# Patient Record
Sex: FEMALE | Race: WHITE | NOT HISPANIC OR LATINO | Employment: STUDENT | URBAN - NONMETROPOLITAN AREA
[De-identification: names, ages, dates, MRNs, and addresses within clinical notes are randomized per-mention and may not be internally consistent; named-entity substitution may affect disease eponyms.]

---

## 2017-01-21 ENCOUNTER — HOSPITAL ENCOUNTER (EMERGENCY)
Facility: HOSPITAL | Age: 22
Discharge: HOME | End: 2017-01-21
Attending: PHYSICIAN ASSISTANT | Admitting: PHYSICIAN ASSISTANT
Payer: COMMERCIAL

## 2017-01-21 VITALS
RESPIRATION RATE: 16 BRPM | OXYGEN SATURATION: 98 % | HEART RATE: 81 BPM | DIASTOLIC BLOOD PRESSURE: 67 MMHG | SYSTOLIC BLOOD PRESSURE: 110 MMHG | HEIGHT: 64 IN | TEMPERATURE: 98 F

## 2017-01-21 DIAGNOSIS — H10.33 ACUTE BACTERIAL CONJUNCTIVITIS OF BOTH EYES: ICD-10-CM

## 2017-01-21 DIAGNOSIS — J01.00 ACUTE MAXILLARY SINUSITIS, RECURRENCE NOT SPECIFIED: ICD-10-CM

## 2017-01-21 PROCEDURE — 99213 OFFICE O/P EST LOW 20 MIN: CPT | Performed by: PHYSICIAN ASSISTANT

## 2017-01-21 PROCEDURE — 99212 OFFICE O/P EST SF 10 MIN: CPT

## 2017-01-21 RX ORDER — AMOXICILLIN 500 MG/1
500 CAPSULE ORAL 3 TIMES DAILY
Qty: 30 CAPSULE | Refills: 0 | Status: SHIPPED | OUTPATIENT
Start: 2017-01-21 | End: 2017-01-31

## 2017-01-21 RX ORDER — NEOMYCIN SULFATE, POLYMYXIN B SULFATE AND DEXAMETHASONE 3.5; 10000; 1 MG/ML; [USP'U]/ML; MG/ML
1 SUSPENSION/ DROPS OPHTHALMIC 4 TIMES DAILY
Qty: 1 BOTTLE | Refills: 0 | Status: SHIPPED | OUTPATIENT
Start: 2017-01-21 | End: 2017-01-26

## 2017-01-21 ASSESSMENT — ENCOUNTER SYMPTOMS
PHOTOPHOBIA: 0
NECK STIFFNESS: 0
TROUBLE SWALLOWING: 0
HEADACHES: 0
COUGH: 1
CARDIOVASCULAR NEGATIVE: 1
VOMITING: 0
VOICE CHANGE: 0
EYE REDNESS: 1
FATIGUE: 0
ABDOMINAL PAIN: 0
DIARRHEA: 0
PSYCHIATRIC NEGATIVE: 1
LIGHT-HEADEDNESS: 0
FEVER: 0
NAUSEA: 0
EYE DISCHARGE: 1
APPETITE CHANGE: 0
SINUS PRESSURE: 0
DIZZINESS: 0
NECK PAIN: 0
SORE THROAT: 1

## 2017-01-21 NOTE — ED PROVIDER NOTES
History     Chief Complaint   Patient presents with     Conjunctivitis     noted biltat eyes burning, itching , mattery since yesterday. Hx of URi     The history is provided by the patient. No  was used.     Candis Mays is a 21 year old female who has 2 days of bilateral eyes matted, red. Has had one month of cough/congestion and sinus pain/pressure.  Denies n/v/d/f/c    Allergies as of 01/21/2017     (No Known Allergies)     Discharge Medication List as of 1/21/2017 10:46 AM      START taking these medications    Details   amoxicillin (AMOXIL) 500 MG capsule Take 1 capsule (500 mg) by mouth 3 times daily for 10 days, Disp-30 capsule, R-0, E-Prescribe      neomycin-polymyxin-dexamethasone (MAXITROL) 3.5-58511-7.1 SUSP ophthalmic susp Place 1 drop into both eyes 4 times daily for 5 days, Disp-1 Bottle, R-0, E-Prescribe           History reviewed. No pertinent past medical history.  History reviewed. No pertinent past surgical history.  History reviewed. No pertinent family history.       Social History     Social History     Marital Status: Single     Spouse Name: N/A     Number of Children: N/A     Years of Education: N/A     Social History Main Topics     Smoking status: Never Smoker      Smokeless tobacco: Never Used     Alcohol Use: No     Drug Use: No     Sexual Activity: Not Asked     Other Topics Concern     None     Social History Narrative       I have reviewed the Medications, Allergies, Past Medical and Surgical History, and Social History in the Epic system.    Review of Systems   Constitutional: Negative for fever, appetite change and fatigue.   HENT: Positive for congestion and sore throat. Negative for ear pain, sinus pressure, trouble swallowing and voice change.    Eyes: Positive for discharge and redness. Negative for photophobia and visual disturbance.   Respiratory: Positive for cough.    Cardiovascular: Negative.    Gastrointestinal: Negative for nausea, vomiting,  "abdominal pain and diarrhea.   Genitourinary: Negative.    Musculoskeletal: Negative for neck pain and neck stiffness.   Skin: Negative for rash.   Neurological: Negative for dizziness, light-headedness and headaches.   Psychiatric/Behavioral: Negative.        Physical Exam   BP: 110/67 mmHg  Pulse: 81  Temp: 98  F (36.7  C)  Resp: 16  Height: 162.6 cm (5' 4\")  SpO2: 98 %  Physical Exam   Constitutional: She is oriented to person, place, and time. She appears well-developed and well-nourished. No distress.   HENT:   Head: Normocephalic and atraumatic.   Right Ear: External ear normal.   Left Ear: External ear normal.   Mouth/Throat: Oropharynx is clear and moist.   Bilateral TMs/canals clear/wnl  Bilateral maxillary sinus TTP     Eyes: EOM are normal. Right eye exhibits discharge. Left eye exhibits discharge. Right conjunctiva is injected. Left conjunctiva is injected.   Neck: Normal range of motion. Neck supple.   Cardiovascular: Normal rate, regular rhythm and normal heart sounds.    Pulmonary/Chest: Effort normal and breath sounds normal. No respiratory distress.   Abdominal: Soft. Bowel sounds are normal. She exhibits no distension. There is no tenderness.   Neurological: She is alert and oriented to person, place, and time.   Skin: Skin is warm and dry. No rash noted. She is not diaphoretic.   Psychiatric: She has a normal mood and affect.   Nursing note and vitals reviewed.      ED Course   Procedures          Assessments & Plan (with Medical Decision Making)     I have reviewed the nursing notes.    I have reviewed the findings, diagnosis, plan and need for follow up with the patient.    Discharge Medication List as of 1/21/2017 10:46 AM      START taking these medications    Details   amoxicillin (AMOXIL) 500 MG capsule Take 1 capsule (500 mg) by mouth 3 times daily for 10 days, Disp-30 capsule, R-0, E-Prescribe      neomycin-polymyxin-dexamethasone (MAXITROL) 3.5-31994-0.1 SUSP ophthalmic susp Place 1 drop " into both eyes 4 times daily for 5 days, Disp-1 Bottle, R-0, E-Prescribe             Final diagnoses:   Acute maxillary sinusitis, recurrence not specified   Acute bacterial conjunctivitis of both eyes         Patient verbally educated and given appropriate education sheets for each of the diagnoses and has no questions.  Take OTC motrin or tylenol as directed on the bottle as needed.  Take prescription medications as directed.  Increase fluids, wash hands often.  Sleep in a recliner or with multiple pillows until this has resolved.  Follow up with your provider if symptoms increase or if concerns develop, return to the ER.  Ana Rosa Rubio Certified   Physician Assistant  1/21/2017  3:54 PM  URGENT CARE CLINIC    1/21/2017   [facility]      Ana Rosa Rubio PA  01/21/17 [id number]

## 2017-01-21 NOTE — ED NOTES
Pt has reddened eyes that itch and burn for a couple of days Pt states that eyes are mattered in the am.. Has had cold sx for over a month: coughing,sneezing,runny nose,sore throats.

## 2017-01-21 NOTE — ED AVS SNAPSHOT
[facility]    [address] [address]    Phone:  [phone]                                       Candis Mays   MRN: [id number]    Department:  [facility]   Date of Visit:  1/21/2017           After Visit Summary Signature Page     I have received my discharge instructions, and my questions have been answered. I have discussed any challenges I see with this plan with the nurse or doctor.    ..........................................................................................................................................  Patient/Patient Representative Signature      ..........................................................................................................................................  Patient Representative Print Name and Relationship to Patient    ..................................................               ................................................  Date                                            Time    ..........................................................................................................................................  Reviewed by Signature/Title    ...................................................              ..............................................  Date                                                            Time

## 2017-01-21 NOTE — ED AVS SNAPSHOT
[facility]    [address] [address]    Phone:  [phone]                                       Candis Mays   MRN: [id number]    Department:  [facility]   Date of Visit:  1/21/2017           Patient Information     Date Of Birth          1995        Your diagnoses for this visit were:     Acute maxillary sinusitis, recurrence not specified     Acute bacterial conjunctivitis of both eyes        You were seen by Ana Rosa Rubio PA.      Follow-up Information     Follow up with Reina Jha RN.    Specialty:  Neonatology    Why:  If symptoms worsen        Follow up with [facility].    Specialty:  EMERGENCY MEDICINE    Why:  If further concerns develop    Contact information:    [address] [address] [phone]    Additional information:    From [facility]: Take [address]. Turn left at [address]. Turn left at the first stoplight on [address]. At the first stop sign, take a right onto [address]. Take a left into the parking lot and continue through until you reach the North enterance of the building.       From [place]: Take [address]. Take the [address] ramp towards [place]. Turn left onto [address]. Take a slight right onto [address]. Turn left at the first stoplight on [address]. At the first stop sign, take a right onto [address]. Take a left into the parking lot and continue through until you reach the North enterance of the building.       From [place]: Take [address]. Take a right at [address]. At the first stop sign, take a right onto [address]. Take a left into the parking lot and continue through until you reach the North enterance of the building.       Discharge References/Attachments     CONJUNCTIVITIS, WHAT IS? (ENGLISH)    SINUSITIS (ANTIBIOTIC TREATMENT) (ENGLISH)         Review of your  "medicines      START taking        Dose / Directions Last dose taken    amoxicillin 500 MG capsule   Commonly known as:  AMOXIL   Dose:  500 mg   Quantity:  30 capsule        Take 1 capsule (500 mg) by mouth 3 times daily for 10 days   Refills:  0        neomycin-polymyxin-dexamethasone 3.5-22779-3.1 Susp ophthalmic susp   Commonly known as:  MAXITROL   Dose:  1 drop   Quantity:  1 Bottle        Place 1 drop into both eyes 4 times daily for 5 days   Refills:  0                Prescriptions were sent or printed at these locations (2 Prescriptions)                   [facility]  [place] - [address] [address]   [address] [address], [place] [address]    Telephone:  [phone]   Fax:  [phone]   Hours:                  E-Prescribed (2 of 2)         amoxicillin (AMOXIL) 500 MG capsule               neomycin-polymyxin-dexamethasone (MAXITROL) 3.5-59990-3.1 SUSP ophthalmic susp                Orders Needing Specimen Collection     None      Pending Results     No orders found from 1/20/2017 to 1/22/2017.            Pending Culture Results     No orders found from 1/20/2017 to 1/22/2017.            Thank you for choosing [facility]       Thank you for choosing [facility] for your care. Our goal is always to provide you with excellent care. Hearing back from our patients is one way we can continue to improve our services. Please take a few minutes to complete the written survey that you may receive in the mail after you visit with us. Thank you!        [employer] Information     [employer] lets you send messages to your doctor, view your test results, renew your prescriptions, schedule appointments and more. To sign up, go to [email or web address] . Click on \"Log in\" on the left side of the screen, which will take you to the Welcome page. Then click on \"Sign up Now\" on the right side of the page.     You will be asked to enter the access code listed below, as well as some personal information. Please follow the directions " to create your username and password.     Your access code is: [email or web address]  Expires: 2017 10:46 AM     Your access code will  in 90 days. If you need help or a new code, please call your [facility] clinic or [phone].        Care EveryWhere ID     This is your Care EveryWhere ID. This could be used by other organizations to access your [facility] medical records  [id number]        After Visit Summary       This is your record. Keep this with you and show to your community pharmacist(s) and doctor(s) at your next visit.

## 2017-06-11 ENCOUNTER — HOSPITAL ENCOUNTER (EMERGENCY)
Facility: HOSPITAL | Age: 22
Discharge: HOME | End: 2017-06-11
Attending: NURSE PRACTITIONER | Admitting: NURSE PRACTITIONER

## 2017-06-11 VITALS
TEMPERATURE: 97 F | OXYGEN SATURATION: 100 % | BODY MASS INDEX: 24.89 KG/M2 | WEIGHT: 145 LBS | SYSTOLIC BLOOD PRESSURE: 119 MMHG | DIASTOLIC BLOOD PRESSURE: 65 MMHG

## 2017-06-11 DIAGNOSIS — N30.00 ACUTE CYSTITIS WITHOUT HEMATURIA: ICD-10-CM

## 2017-06-11 LAB
ALBUMIN UR-MCNC: 30 MG/DL
APPEARANCE UR: ABNORMAL
BACTERIA #/AREA URNS HPF: ABNORMAL /HPF
BILIRUB UR QL STRIP: NEGATIVE
COLOR UR AUTO: YELLOW
GLUCOSE UR STRIP-MCNC: NEGATIVE MG/DL
HGB UR QL STRIP: NEGATIVE
KETONES UR STRIP-MCNC: 5 MG/DL
LEUKOCYTE ESTERASE UR QL STRIP: ABNORMAL
MUCOUS THREADS #/AREA URNS LPF: PRESENT /LPF
NITRATE UR QL: NEGATIVE
PH UR STRIP: 6.5 PH (ref 4.7–8)
RBC #/AREA URNS AUTO: 8 /HPF (ref 0–2)
SP GR UR STRIP: 1.03 (ref 1–1.03)
SQUAMOUS #/AREA URNS AUTO: 10 /HPF (ref 0–1)
TRANS CELLS #/AREA URNS HPF: 3 /HPF (ref 0–1)
URN SPEC COLLECT METH UR: ABNORMAL
UROBILINOGEN UR STRIP-MCNC: 2 MG/DL (ref 0–2)
WBC #/AREA URNS AUTO: 24 /HPF (ref 0–2)

## 2017-06-11 PROCEDURE — 81001 URINALYSIS AUTO W/SCOPE: CPT | Performed by: NURSE PRACTITIONER

## 2017-06-11 PROCEDURE — 99213 OFFICE O/P EST LOW 20 MIN: CPT

## 2017-06-11 PROCEDURE — 99213 OFFICE O/P EST LOW 20 MIN: CPT | Performed by: NURSE PRACTITIONER

## 2017-06-11 PROCEDURE — 87088 URINE BACTERIA CULTURE: CPT | Performed by: NURSE PRACTITIONER

## 2017-06-11 PROCEDURE — 87086 URINE CULTURE/COLONY COUNT: CPT | Performed by: NURSE PRACTITIONER

## 2017-06-11 RX ORDER — NITROFURANTOIN 25; 75 MG/1; MG/1
100 CAPSULE ORAL 2 TIMES DAILY
Qty: 10 CAPSULE | Refills: 0 | Status: SHIPPED | OUTPATIENT
Start: 2017-06-11 | End: 2017-06-16

## 2017-06-11 RX ORDER — PHENAZOPYRIDINE HYDROCHLORIDE 200 MG/1
200 TABLET, FILM COATED ORAL 3 TIMES DAILY PRN
Qty: 6 TABLET | Refills: 0 | Status: SHIPPED | OUTPATIENT
Start: 2017-06-11 | End: 2017-06-13

## 2017-06-11 ASSESSMENT — ENCOUNTER SYMPTOMS
CHILLS: 0
ABDOMINAL PAIN: 0
FATIGUE: 0
BACK PAIN: 0
FREQUENCY: 1
FEVER: 0
NAUSEA: 0
VOMITING: 0
DYSURIA: 1
APPETITE CHANGE: 0
HEMATURIA: 1
DIARRHEA: 0

## 2017-06-11 NOTE — ED PROVIDER NOTES
History     Chief Complaint   Patient presents with     Urgency     c/o urgency and burning since Friday - also state urine is dark willi     The history is provided by the patient. No  was used.     Candis Mays is a 21 year old female who presents today with a CC of urgency and frequency of urination x 2 days.  No fevers or chills.  Appetite is good, she is pushing fluids.  She took cranberry pills for symptoms.  She has no concerns for pregnancy.  She had her period 2 weeks ago, she is sexually active and using condoms for birth control.  She denies concerns for STI.      I have reviewed the Medications, Allergies, Past Medical and Surgical History, and Social History in the Epic system.    Allergies: No Known Allergies    No current facility-administered medications on file prior to encounter.   No current outpatient prescriptions on file prior to encounter.  There is no problem list on file for this patient.      Review of Systems   Constitutional: Negative for appetite change, chills, fatigue and fever.   Gastrointestinal: Negative for abdominal pain, diarrhea, nausea and vomiting.   Genitourinary: Positive for dysuria, frequency and hematuria (2 days ago). Negative for genital sores, pelvic pain, vaginal bleeding, vaginal discharge and vaginal pain.   Musculoskeletal: Negative for back pain.   Skin: Negative for rash.       Physical Exam   BP: 119/65  Heart Rate: 71  Temp: 97  F (36.1  C)  Weight: 65.8 kg (145 lb)  SpO2: 100 %    Physical Exam   Constitutional: She appears well-developed and well-nourished. She is cooperative. She does not appear ill.   HENT:   Head: Normocephalic and atraumatic.   Cardiovascular: Normal rate and regular rhythm.    Pulmonary/Chest: Effort normal and breath sounds normal.   Abdominal: Bowel sounds are normal. She exhibits no distension. There is no tenderness. There is no rebound, no guarding and no CVA tenderness.   Neurological: She is alert.    Nursing note and vitals reviewed.      ED Course     ED Course     Procedures    Results for orders placed or performed during the hospital encounter of 06/11/17   UA reflex to Microscopic and Culture   Result Value Ref Range    Color Urine Yellow     Appearance Urine Slightly Cloudy     Glucose Urine Negative NEG mg/dL    Bilirubin Urine Negative NEG    Ketones Urine 5 (A) NEG mg/dL    Specific Gravity Urine 1.030 1.003 - 1.035    Blood Urine Negative NEG    pH Urine 6.5 4.7 - 8.0 pH    Protein Albumin Urine 30 (A) NEG mg/dL    Urobilinogen mg/dL 2.0 0.0 - 2.0 mg/dL    Nitrite Urine Negative NEG    Leukocyte Esterase Urine Moderate (A) NEG    Source Midstream Urine     RBC Urine 8 (H) 0 - 2 /HPF    WBC Urine 24 (H) 0 - 2 /HPF    Bacteria Urine Moderate (A) NEG /HPF    Squamous Epithelial /HPF Urine 10 (H) 0 - 1 /HPF    Transitional Epi 3 (H) 0 - 1 /HPF    Mucous Urine Present (A) NEG /LPF   Urine Culture Aerobic Bacterial   Result Value Ref Range    Specimen Description Midstream Urine     Culture Micro (A)      >100,000 colonies/mL Mixed bacterial kamille  <10,000 colonies/mL Beta hemolytic Streptococcus group B Susceptibility testing   not routinely done      Micro Report Status FINAL 06/13/2017      Medications - No data to display    Assessments & Plan (with Medical Decision Making)     I have reviewed the nursing notes.    I have reviewed the findings, diagnosis, plan and need for follow up with the patient.  ASSESSMENT / PLAN:  (N30.00) Acute cystitis without hematuria  Comment: symptomatic  Plan: Drink lots of fluids (at least 6-8 glasses a day, unless you must restrict fluids for other medical reasons).    Avoid sexual intercourse until your symptoms are gone   Bactrim as prescribed   Pyridium as needed for 1-2 days   PREVENTING FUTURE INFECTIONS:   Always wipe from front to back after a bowel movement. Keep the genital area clean and dry.    Drink plenty of fluids each day to avoid dehydration.  Urinate  right after intercourse to flush out the bladder.     Wear cotton underwear and cotton-lined panty hose; avoid tight-fitting pants.   FOLLOW UP Return to this facility or see your doctor if ALL symptoms are not gone after three days of treatment.   GET PROMPT MEDICAL ATTENTION if any of the following occur:     Fever over 101 F (38.3 C), No improvement by the third day of treatment, Increasing back or abdominal pain, vomiting,    unable to keep fluids or medicine down, weakness, dizziness, or fainting, vaginal discharge, pain, redness, or swelling of the vaginal area      Discharge Medication List as of 6/11/2017  3:52 PM      START taking these medications    Details   nitrofurantoin, macrocrystal-monohydrate, (MACROBID) 100 MG capsule Take 1 capsule (100 mg) by mouth 2 times daily for 5 days, Disp-10 capsule, R-0, E-Prescribe      phenazopyridine (PYRIDIUM) 200 MG tablet Take 1 tablet (200 mg) by mouth 3 times daily as needed for irritation, Disp-6 tablet, R-0, E-Prescribe             Final diagnoses:   Acute cystitis without hematuria       6/11/2017   [facility]     Reina Villegas NP  06/13/17 1672

## 2017-06-11 NOTE — ED AVS SNAPSHOT
[facility]    [address] [address]    Phone:  [phone]                                       Candis Mays   MRN: [id number]    Department:  [facility]   Date of Visit:  6/11/2017           Patient Information     Date Of Birth          1995        Your diagnoses for this visit were:     Acute cystitis without hematuria        You were seen by Reina Villegas NP.      Follow-up Information     Follow up with [facility].    Specialty:  EMERGENCY MEDICINE    Why:  As needed, If symptoms worsen, or concerns develop    Contact information:    [address] [address] [phone]    Additional information:    From [facility]: Take [address]. Turn left at [address]. Turn left at the first stoplight on [address]. At the first stop sign, take a right onto [address]. Take a left into the parking lot and continue through until you reach the North enterance of the building.       From [place]: Take [address]. Take the [address] ramp towards [place]. Turn left onto [address]. Take a slight right onto [address]. Turn left at the first stoplight on [address]. At the first stop sign, take a right onto [address]. Take a left into the parking lot and continue through until you reach the North enterance of the building.       From [place]: Take [address]. Take a right at [address]. At the first stop sign, take a right onto [address]. Take a left into the parking lot and continue through until you reach the North enterance of the building.         Follow up with Reina Jha, RN.    Specialty:  Neonatology    Why:  As needed, if symptoms do not improve        Discharge Instructions         Understanding Urinary Tract Infections (UTIs)  Most UTIs are caused by bacteria, although they may also be caused by viruses or  fungi. Bacteria from the bowel are the most common source of infection. The infection may begin because of any of the following:    Sexual activity. During sex, germs can travel from the penis, vagina, or rectum into the urethra.     Germs on the skin outside the rectum may travel into the urethra. This is more common in women since the rectum and urethra are closer to each other than in men. Wiping from front to back after using the toilet and keeping the area clean can help prevent germs from getting to the urethra.    Blockage of urine flow through the urinary tract. If urine sits too long, germs may begin to grow out of control.      Parts of the urinary tract  The infection can occur in any part of the urinary tract.    The kidneys collect and store urine.    The ureters carry urine from the kidneys to the bladder.    The bladder holds urine until you are ready to let it out.    The urethra carries urine from the bladder out of the body. It is shorter in women, so bacteria can move through it more easily. The urethra is longer in men, so a UTI is less likely to reach the bladder or kidneys in men.    5765-9328 [employer]. [address] [place] [address]. All rights reserved. This information is not intended as a substitute for professional medical care. Always follow your healthcare professional's instructions.             Review of your medicines      START taking        Dose / Directions Last dose taken    nitrofurantoin (macrocrystal-monohydrate) 100 MG capsule   Commonly known as:  MACROBID   Dose:  100 mg   Quantity:  10 capsule        Take 1 capsule (100 mg) by mouth 2 times daily for 5 days   Refills:  0        phenazopyridine 200 MG tablet   Commonly known as:  PYRIDIUM   Dose:  200 mg   Quantity:  6 tablet        Take 1 tablet (200 mg) by mouth 3 times daily as needed for irritation   Refills:  0                Prescriptions were sent or printed at these locations (2  "Prescriptions)                   [facility] [address] [place] [address]   [address] [address]    Telephone:  [phone]   Fax:  [phone]   Hours:                  E-Prescribed (2 of 2)         nitrofurantoin, macrocrystal-monohydrate, (MACROBID) 100 MG capsule               phenazopyridine (PYRIDIUM) 200 MG tablet                Procedures and tests performed during your visit     UA reflex to Microscopic and Culture    Urine Culture Aerobic Bacterial      Orders Needing Specimen Collection     None      Pending Results     Date and Time Order Name Status Description    2017 1547 Urine Culture Aerobic Bacterial In process             Pending Culture Results     Date and Time Order Name Status Description    2017 1547 Urine Culture Aerobic Bacterial In process             Thank you for choosing [facility]       Thank you for choosing [facility] for your care. Our goal is always to provide you with excellent care. Hearing back from our patients is one way we can continue to improve our services. Please take a few minutes to complete the written survey that you may receive in the mail after you visit with us. Thank you!        .com Information     .com lets you send messages to your doctor, view your test results, renew your prescriptions, schedule appointments and more. To sign up, go to [email or web address] . Click on \"Log in\" on the left side of the screen, which will take you to the Welcome page. Then click on \"Sign up Now\" on the right side of the page.     You will be asked to enter the access code listed below, as well as some personal information. Please follow the directions to create your username and password.     Your access code is: [email or web address]  Expires: 2017  3:52 PM     Your access code will  in 90 days. If you need help or a new code, please call your [facility] clinic or [phone].        Care EveryWhere ID     This is your Care EveryWhere ID. " This could be used by other organizations to access your [facility] medical records  [id number]        After Visit Summary       This is your record. Keep this with you and show to your community pharmacist(s) and doctor(s) at your next visit.

## 2017-06-11 NOTE — DISCHARGE INSTRUCTIONS
Understanding Urinary Tract Infections (UTIs)  Most UTIs are caused by bacteria, although they may also be caused by viruses or fungi. Bacteria from the bowel are the most common source of infection. The infection may begin because of any of the following:    Sexual activity. During sex, germs can travel from the penis, vagina, or rectum into the urethra.     Germs on the skin outside the rectum may travel into the urethra. This is more common in women since the rectum and urethra are closer to each other than in men. Wiping from front to back after using the toilet and keeping the area clean can help prevent germs from getting to the urethra.    Blockage of urine flow through the urinary tract. If urine sits too long, germs may begin to grow out of control.      Parts of the urinary tract  The infection can occur in any part of the urinary tract.    The kidneys collect and store urine.    The ureters carry urine from the kidneys to the bladder.    The bladder holds urine until you are ready to let it out.    The urethra carries urine from the bladder out of the body. It is shorter in women, so bacteria can move through it more easily. The urethra is longer in men, so a UTI is less likely to reach the bladder or kidneys in men.    4994-7760 [employer]. [address] [place] [address], [place] [address]. All rights reserved. This information is not intended as a substitute for professional medical care. Always follow your healthcare professional's instructions.

## 2017-06-11 NOTE — ED AVS SNAPSHOT
[facility]    [address] [address]    Phone:  [phone]                                       Candis Mays   MRN: [id number]    Department:  [facility]   Date of Visit:  6/11/2017           After Visit Summary Signature Page     I have received my discharge instructions, and my questions have been answered. I have discussed any challenges I see with this plan with the nurse or doctor.    ..........................................................................................................................................  Patient/Patient Representative Signature      ..........................................................................................................................................  Patient Representative Print Name and Relationship to Patient    ..................................................               ................................................  Date                                            Time    ..........................................................................................................................................  Reviewed by Signature/Title    ...................................................              ..............................................  Date                                                            Time

## 2017-06-13 LAB
BACTERIA SPEC CULT: ABNORMAL
MICRO REPORT STATUS: ABNORMAL
SPECIMEN SOURCE: ABNORMAL

## 2017-06-13 NOTE — PROGRESS NOTES
Urine Culture - Final - Mixed bacterial kamille, Beta hemolytic Streptococcus group B, susceptibility testing no routinely done.  Pt was prescribed Macrobid and Pyridium at UC visit on 6/11.  No changes to treatment plan per JUSTIN Villegas NP.  Results routed to PCP, Dr. JUSTIN Copeland at [facility].

## 2019-09-19 ENCOUNTER — APPOINTMENT (OUTPATIENT)
Dept: OCCUPATIONAL MEDICINE | Facility: OTHER | Age: 24
End: 2019-09-19

## 2019-09-24 ENCOUNTER — APPOINTMENT (OUTPATIENT)
Dept: OCCUPATIONAL MEDICINE | Facility: OTHER | Age: 24
End: 2019-09-24

## 2019-09-30 ENCOUNTER — APPOINTMENT (OUTPATIENT)
Dept: OCCUPATIONAL MEDICINE | Facility: OTHER | Age: 24
End: 2019-09-30

## 2019-11-05 ENCOUNTER — APPOINTMENT (OUTPATIENT)
Dept: OCCUPATIONAL MEDICINE | Facility: OTHER | Age: 24
End: 2019-11-05

## 2020-04-15 ENCOUNTER — APPOINTMENT (OUTPATIENT)
Dept: OCCUPATIONAL MEDICINE | Facility: OTHER | Age: 25
End: 2020-04-15

## 2020-11-04 ENCOUNTER — NURSE TRIAGE (OUTPATIENT)
Dept: FAMILY MEDICINE | Facility: OTHER | Age: 25
End: 2020-11-04

## 2020-11-04 DIAGNOSIS — Z20.822 SUSPECTED 2019 NOVEL CORONAVIRUS INFECTION: Primary | ICD-10-CM

## 2020-11-04 NOTE — TELEPHONE ENCOUNTER
Patient called with symptoms or headache, loss of taste and smell, and slight cough, patient denies any fever or difficulty breathing. Patient scheduled for COVID swab and given care advice and isolation guidelines. Patient advised to call back or be seen in UC/ED if symptoms worsened. Patient verbalized understanding.    Reason for Disposition    [1] COVID-19 infection suspected by caller or triager AND [2] mild symptoms (cough, fever, or others) AND [3] no complications or SOB    Additional Information    Negative: SEVERE difficulty breathing (e.g., struggling for each breath, speaks in single words)    Negative: Difficult to awaken or acting confused (e.g., disoriented, slurred speech)    Negative: Bluish (or gray) lips or face now    Negative: Shock suspected (e.g., cold/pale/clammy skin, too weak to stand, low BP, rapid pulse)    Negative: Sounds like a life-threatening emergency to the triager    Negative: [1] COVID-19 exposure AND [2] no symptoms    Negative: COVID-19 and Breastfeeding, questions about    Negative: [1] Adult with possible COVID-19 symptoms AND [2] triager concerned about severity of symptoms or other causes    Negative: SEVERE or constant chest pain or pressure (Exception: mild central chest pain, present only when coughing)    Negative: MODERATE difficulty breathing (e.g., speaks in phrases, SOB even at rest, pulse 100-120)    Negative: Patient sounds very sick or weak to the triager    Negative: MILD difficulty breathing (e.g., minimal/no SOB at rest, SOB with walking, pulse <100)    Negative: Chest pain or pressure    Negative: Fever > 103 F (39.4 C)    Negative: [1] Fever > 101 F (38.3 C) AND [2] age > 60    Negative: [1] Fever > 100.0 F (37.8 C) AND [2] bedridden (e.g., nursing home patient, CVA, chronic illness, recovering from surgery)    Negative: HIGH RISK patient (e.g., age > 64 years, diabetes, heart or lung disease, weak immune system) (Exception: Has already been evaluated by  "healthcare provider and has no new or worsening symptoms)    Negative: Fever present > 3 days (72 hours)    Negative: [1] Fever returns after gone for over 24 hours AND [2] symptoms worse or not improved    Negative: [1] Continuous (nonstop) coughing interferes with work or school AND [2] no improvement using cough treatment per protocol    Answer Assessment - Initial Assessment Questions  1. COVID-19 DIAGNOSIS: \"Who made your Coronavirus (COVID-19) diagnosis?\" \"Was it confirmed by a positive lab test?\" If not diagnosed by a HCP, ask \"Are there lots of cases (community spread) where you live?\" (See public health department website, if unsure)      No diagnosis  2. ONSET: \"When did the COVID-19 symptoms start?\"       About 1.5 weeks ago  3. WORST SYMPTOM: \"What is your worst symptom?\" (e.g., cough, fever, shortness of breath, muscle aches)      Loss of taste and smell  4. COUGH: \"Do you have a cough?\" If so, ask: \"How bad is the cough?\"        Yes. slight  5. FEVER: \"Do you have a fever?\" If so, ask: \"What is your temperature, how was it measured, and when did it start?\"      no  6. RESPIRATORY STATUS: \"Describe your breathing?\" (e.g., shortness of breath, wheezing, unable to speak)       no  7. BETTER-SAME-WORSE: \"Are you getting better, staying the same or getting worse compared to yesterday?\"  If getting worse, ask, \"In what way?\"      same  8. HIGH RISK DISEASE: \"Do you have any chronic medical problems?\" (e.g., asthma, heart or lung disease, weak immune system, etc.)      no  9. PREGNANCY: \"Is there any chance you are pregnant?\" \"When was your last menstrual period?\"      no  10. OTHER SYMPTOMS: \"Do you have any other symptoms?\"  (e.g., chills, fatigue, headache, loss of smell or taste, muscle pain, sore throat)        Headache, loss of smell and taste, sore throat intermittently    Protocols used: CORONAVIRUS (COVID-19) DIAGNOSED OR [id number] 8.4.20      "

## 2020-11-05 ENCOUNTER — OFFICE VISIT (OUTPATIENT)
Dept: FAMILY MEDICINE | Facility: OTHER | Age: 25
End: 2020-11-05
Attending: FAMILY MEDICINE
Payer: COMMERCIAL

## 2020-11-05 DIAGNOSIS — Z20.822 SUSPECTED 2019 NOVEL CORONAVIRUS INFECTION: ICD-10-CM

## 2020-11-05 PROCEDURE — U0003 INFECTIOUS AGENT DETECTION BY NUCLEIC ACID (DNA OR RNA); SEVERE ACUTE RESPIRATORY SYNDROME CORONAVIRUS 2 (SARS-COV-2) (CORONAVIRUS DISEASE [COVID-19]), AMPLIFIED PROBE TECHNIQUE, MAKING USE OF HIGH THROUGHPUT TECHNOLOGIES AS DESCRIBED BY CMS-2020-01-R: HCPCS | Performed by: FAMILY MEDICINE

## 2020-11-07 LAB
SARS-COV-2 RNA SPEC QL NAA+PROBE: ABNORMAL
SPECIMEN SOURCE: ABNORMAL

## 2020-11-29 ENCOUNTER — HEALTH MAINTENANCE LETTER (OUTPATIENT)
Age: 25
End: 2020-11-29

## 2021-01-08 ENCOUNTER — APPOINTMENT (OUTPATIENT)
Dept: OCCUPATIONAL MEDICINE | Facility: OTHER | Age: 26
End: 2021-01-08

## 2021-01-08 PROCEDURE — 86580 TB INTRADERMAL TEST: CPT

## 2021-01-22 ENCOUNTER — APPOINTMENT (OUTPATIENT)
Dept: OCCUPATIONAL MEDICINE | Facility: OTHER | Age: 26
End: 2021-01-22

## 2021-01-22 PROCEDURE — 86580 TB INTRADERMAL TEST: CPT

## 2021-01-26 ENCOUNTER — APPOINTMENT (OUTPATIENT)
Dept: OCCUPATIONAL MEDICINE | Facility: OTHER | Age: 26
End: 2021-01-26

## 2021-01-26 PROCEDURE — 86580 TB INTRADERMAL TEST: CPT

## 2021-07-06 ENCOUNTER — PRENATAL OFFICE VISIT (OUTPATIENT)
Dept: OBGYN | Facility: OTHER | Age: 26
End: 2021-07-06
Attending: OBSTETRICS & GYNECOLOGY
Payer: COMMERCIAL

## 2021-07-06 VITALS
HEIGHT: 64 IN | SYSTOLIC BLOOD PRESSURE: 122 MMHG | OXYGEN SATURATION: 100 % | DIASTOLIC BLOOD PRESSURE: 68 MMHG | WEIGHT: 157 LBS | BODY MASS INDEX: 26.8 KG/M2 | HEART RATE: 100 BPM

## 2021-07-06 DIAGNOSIS — Z34.91 PREGNANCY WITH UNCERTAIN DATES IN FIRST TRIMESTER: ICD-10-CM

## 2021-07-06 DIAGNOSIS — Z32.01 PREGNANCY TEST POSITIVE: Primary | ICD-10-CM

## 2021-07-06 LAB
ABO + RH BLD: NORMAL
ABO + RH BLD: NORMAL
B-HCG SERPL-ACNC: 6004 IU/L (ref 0–5)
BLD GP AB SCN SERPL QL: NORMAL
BLOOD BANK CMNT PATIENT-IMP: NORMAL
BLOOD BANK CMNT PATIENT-IMP: NORMAL
PROGEST SERPL-MCNC: 17 NG/ML
SPECIMEN EXP DATE BLD: NORMAL

## 2021-07-06 PROCEDURE — 76817 TRANSVAGINAL US OBSTETRIC: CPT | Performed by: OBSTETRICS & GYNECOLOGY

## 2021-07-06 PROCEDURE — 86850 RBC ANTIBODY SCREEN: CPT | Performed by: OBSTETRICS & GYNECOLOGY

## 2021-07-06 PROCEDURE — 86900 BLOOD TYPING SEROLOGIC ABO: CPT | Performed by: OBSTETRICS & GYNECOLOGY

## 2021-07-06 PROCEDURE — 86901 BLOOD TYPING SEROLOGIC RH(D): CPT | Performed by: OBSTETRICS & GYNECOLOGY

## 2021-07-06 PROCEDURE — 36415 COLL VENOUS BLD VENIPUNCTURE: CPT | Performed by: OBSTETRICS & GYNECOLOGY

## 2021-07-06 PROCEDURE — 84144 ASSAY OF PROGESTERONE: CPT | Performed by: OBSTETRICS & GYNECOLOGY

## 2021-07-06 PROCEDURE — 84702 CHORIONIC GONADOTROPIN TEST: CPT | Performed by: OBSTETRICS & GYNECOLOGY

## 2021-07-06 PROCEDURE — 99207 PR FIRST OB VISIT: CPT | Performed by: OBSTETRICS & GYNECOLOGY

## 2021-07-06 ASSESSMENT — PAIN SCALES - GENERAL: PAINLEVEL: NO PAIN (0)

## 2021-07-06 ASSESSMENT — MIFFLIN-ST. JEOR: SCORE: 1442.15

## 2021-07-06 NOTE — NURSING NOTE
"Chief Complaint   Patient presents with     Prenatal Care     Pre new OB- LMP- 5/5/21- GA-8 6/7       Initial /68 (BP Location: Left arm, Patient Position: Chair, Cuff Size: Adult Regular)   Pulse 100   Ht 1.626 m (5' 4\")   Wt 71.2 kg (157 lb)   SpO2 100%   BMI 26.95 kg/m   Estimated body mass index is 26.95 kg/m  as calculated from the following:    Height as of this encounter: 1.626 m (5' 4\").    Weight as of this encounter: 71.2 kg (157 lb).  Medication Reconciliation: complete  MAHESH SMITH LPN    "

## 2021-07-06 NOTE — PROGRESS NOTES
"  HPI:  Candis Marcos is a 25 year old female  No LMP recorded. Patient is pregnant. at Unknown, Estimated Date of Delivery: Data Unavailable.  She denies vaginal bleeding, nausea , vomiting and abdominal pain.   No other c/o.  Menses regular but did have an neg HCG  and positive .     History reviewed. No pertinent past medical history.    History reviewed. No pertinent surgical history.    Allergies: Patient has no known allergies.     ROS:   Denies fever, wt loss   Neg /GI other than per HPI      EXAM:  Blood pressure 122/68, pulse 100, height 1.626 m (5' 4\"), weight 71.2 kg (157 lb), SpO2 100 %, not currently breastfeeding.   BMI= Body mass index is 26.95 kg/m .  General - pleasant female in no acute distress.  Abdomen - soft, nontender, nondistended, no hepatosplenomegaly.  Pelvic - EG: normal adult female, BUS: within normal limits,Rectovaginal - deferred.    US:    Transvaginal:Yes  CRL:  3.1mm  FCA present:No  EGA 5w 6d            ASSESSMENT/PLAN:  (Z32.01) Pregnancy test positive  (primary encounter diagnosis)  Comment: + IUP with uncertain/discordant dates  Plan: ABO/Rh type and screen, Progesterone, HCG         quantitative pregnancy,   Repeat HCG quantitative (2-3 days)  Repeat US 1-2 weeks for dates and viability       1st Trimester/bleeding  precautions and testing discussed.  Patient has my card and phone number to call prn if problems in interim.    Ethan Mesa MD  "

## 2021-07-09 DIAGNOSIS — Z32.01 PREGNANCY TEST POSITIVE: ICD-10-CM

## 2021-07-09 LAB — B-HCG SERPL-ACNC: ABNORMAL IU/L (ref 0–5)

## 2021-07-09 PROCEDURE — 36415 COLL VENOUS BLD VENIPUNCTURE: CPT | Mod: ZL | Performed by: OBSTETRICS & GYNECOLOGY

## 2021-07-09 PROCEDURE — 84702 CHORIONIC GONADOTROPIN TEST: CPT | Mod: ZL | Performed by: OBSTETRICS & GYNECOLOGY

## 2021-07-20 ENCOUNTER — PRENATAL OFFICE VISIT (OUTPATIENT)
Dept: OBGYN | Facility: OTHER | Age: 26
End: 2021-07-20
Attending: OBSTETRICS & GYNECOLOGY
Payer: COMMERCIAL

## 2021-07-20 VITALS
HEART RATE: 96 BPM | SYSTOLIC BLOOD PRESSURE: 114 MMHG | WEIGHT: 154 LBS | OXYGEN SATURATION: 98 % | DIASTOLIC BLOOD PRESSURE: 74 MMHG | BODY MASS INDEX: 26.29 KG/M2 | HEIGHT: 64 IN

## 2021-07-20 DIAGNOSIS — Z34.91 PREGNANCY WITH UNCERTAIN DATES IN FIRST TRIMESTER: ICD-10-CM

## 2021-07-20 DIAGNOSIS — Z34.01 ENCOUNTER FOR SUPERVISION OF NORMAL FIRST PREGNANCY IN FIRST TRIMESTER: Primary | ICD-10-CM

## 2021-07-20 LAB
ABO/RH(D): NORMAL
ALBUMIN UR-MCNC: NEGATIVE MG/DL
AMPHETAMINES UR QL: NOT DETECTED
ANTIBODY SCREEN: NEGATIVE
APPEARANCE UR: CLEAR
BACTERIA #/AREA URNS HPF: ABNORMAL /HPF
BARBITURATES UR QL SCN: NOT DETECTED
BENZODIAZ UR QL SCN: NOT DETECTED
BILIRUB UR QL STRIP: NEGATIVE
BUPRENORPHINE UR QL: NOT DETECTED
CANNABINOIDS UR QL: NOT DETECTED
COCAINE UR QL SCN: NOT DETECTED
COLOR UR AUTO: ABNORMAL
D-METHAMPHET UR QL: NOT DETECTED
ERYTHROCYTE [DISTWIDTH] IN BLOOD BY AUTOMATED COUNT: 12.1 % (ref 10–15)
GLUCOSE UR STRIP-MCNC: NEGATIVE MG/DL
HCT VFR BLD AUTO: 43.2 % (ref 35–47)
HGB BLD-MCNC: 14.6 G/DL (ref 11.7–15.7)
HGB UR QL STRIP: NEGATIVE
KETONES UR STRIP-MCNC: NEGATIVE MG/DL
LEUKOCYTE ESTERASE UR QL STRIP: NEGATIVE
MCH RBC QN AUTO: 30.4 PG (ref 26.5–33)
MCHC RBC AUTO-ENTMCNC: 33.8 G/DL (ref 31.5–36.5)
MCV RBC AUTO: 90 FL (ref 78–100)
METHADONE UR QL SCN: NOT DETECTED
MUCOUS THREADS #/AREA URNS LPF: PRESENT /LPF
NITRATE UR QL: NEGATIVE
OPIATES UR QL SCN: NOT DETECTED
OXYCODONE UR QL SCN: NOT DETECTED
PCP UR QL SCN: NOT DETECTED
PH UR STRIP: 6 [PH] (ref 4.7–8)
PLATELET # BLD AUTO: 309 10E3/UL (ref 150–450)
PROPOXYPH UR QL: NOT DETECTED
RBC # BLD AUTO: 4.81 10E6/UL (ref 3.8–5.2)
RBC URINE: 1 /HPF
SP GR UR STRIP: 1.01 (ref 1–1.03)
SPECIMEN EXPIRATION DATE: NORMAL
TRICYCLICS UR QL SCN: NOT DETECTED
UROBILINOGEN UR STRIP-MCNC: NORMAL MG/DL
WBC # BLD AUTO: 11.2 10E3/UL (ref 4–11)
WBC URINE: <1 /HPF

## 2021-07-20 PROCEDURE — 86762 RUBELLA ANTIBODY: CPT | Performed by: OBSTETRICS & GYNECOLOGY

## 2021-07-20 PROCEDURE — 87086 URINE CULTURE/COLONY COUNT: CPT | Performed by: OBSTETRICS & GYNECOLOGY

## 2021-07-20 PROCEDURE — 99207 PR PRENATAL VISIT: CPT | Performed by: OBSTETRICS & GYNECOLOGY

## 2021-07-20 PROCEDURE — 86850 RBC ANTIBODY SCREEN: CPT | Performed by: OBSTETRICS & GYNECOLOGY

## 2021-07-20 PROCEDURE — 81001 URINALYSIS AUTO W/SCOPE: CPT | Mod: 59 | Performed by: OBSTETRICS & GYNECOLOGY

## 2021-07-20 PROCEDURE — 87389 HIV-1 AG W/HIV-1&-2 AB AG IA: CPT | Performed by: OBSTETRICS & GYNECOLOGY

## 2021-07-20 PROCEDURE — 87340 HEPATITIS B SURFACE AG IA: CPT | Performed by: OBSTETRICS & GYNECOLOGY

## 2021-07-20 PROCEDURE — 86900 BLOOD TYPING SEROLOGIC ABO: CPT | Performed by: OBSTETRICS & GYNECOLOGY

## 2021-07-20 PROCEDURE — 86803 HEPATITIS C AB TEST: CPT | Performed by: OBSTETRICS & GYNECOLOGY

## 2021-07-20 PROCEDURE — 86780 TREPONEMA PALLIDUM: CPT | Performed by: OBSTETRICS & GYNECOLOGY

## 2021-07-20 PROCEDURE — 86901 BLOOD TYPING SEROLOGIC RH(D): CPT | Performed by: OBSTETRICS & GYNECOLOGY

## 2021-07-20 PROCEDURE — 80306 DRUG TEST PRSMV INSTRMNT: CPT | Performed by: OBSTETRICS & GYNECOLOGY

## 2021-07-20 PROCEDURE — 76817 TRANSVAGINAL US OBSTETRIC: CPT | Performed by: OBSTETRICS & GYNECOLOGY

## 2021-07-20 PROCEDURE — G0463 HOSPITAL OUTPT CLINIC VISIT: HCPCS | Mod: 25

## 2021-07-20 PROCEDURE — 85027 COMPLETE CBC AUTOMATED: CPT | Performed by: OBSTETRICS & GYNECOLOGY

## 2021-07-20 ASSESSMENT — MIFFLIN-ST. JEOR: SCORE: 1428.54

## 2021-07-20 ASSESSMENT — PAIN SCALES - GENERAL: PAINLEVEL: NO PAIN (0)

## 2021-07-20 NOTE — NURSING NOTE
"Chief Complaint   Patient presents with     Prenatal Care     repeat pre new        Initial /74 (BP Location: Left arm, Cuff Size: Adult Regular)   Pulse 96   Ht 1.626 m (5' 4\")   Wt 69.9 kg (154 lb)   SpO2 98%   BMI 26.43 kg/m   Estimated body mass index is 26.43 kg/m  as calculated from the following:    Height as of this encounter: 1.626 m (5' 4\").    Weight as of this encounter: 69.9 kg (154 lb).  Medication Reconciliation: complete  Oneyda Oropeza LPN  "

## 2021-07-20 NOTE — LETTER
July 21, 2021          Candis Uriartesalomónlloyd  [address]  [place] [address]        Dear Candis,         Your New OB labs are all normal. If you have any questions please call [phone].            Resulted Orders   CBC with platelets   Result Value Ref Range    WBC Count 11.2 (H) 4.0 - 11.0 10e3/uL    RBC Count 4.81 3.80 - 5.20 10e6/uL    Hemoglobin 14.6 11.7 - 15.7 g/dL    Hematocrit 43.2 35.0 - 47.0 %    MCV 90 78 - 100 fL    MCH 30.4 26.5 - 33.0 pg    MCHC 33.8 31.5 - 36.5 g/dL    RDW 12.1 10.0 - 15.0 %    Platelet Count 309 150 - 450 10e3/uL   Hepatitis B surface antigen   Result Value Ref Range    Hepatitis B Surface Antigen Nonreactive Nonreactive   HIV Antigen Antibody Combo   Result Value Ref Range    HIV Antigen Antibody Combo Nonreactive Nonreactive      Comment:      HIV-1 p24 Ag & HIV-1/HIV-2 Ab Not Detected   Rubella Antibody IgG   Result Value Ref Range    Rubella Rzaia IgG Instrument Value 1.43 (H) <0.90 Index    Rubella Antibody IgG Positive (A) Negative      Comment:      Suggests previous exposure or immunization and probable immunity.   Treponema Abs w Reflex to RPR and Titer   Result Value Ref Range    Treponema Antibody Total Nonreactive Nonreactive   UA with Microscopic reflex to Culture   Result Value Ref Range    Color Urine Straw Colorless, Straw, Light Yellow, Yellow    Appearance Urine Clear Clear    Glucose Urine Negative Negative mg/dL    Bilirubin Urine Negative Negative    Ketones Urine Negative Negative mg/dL    Specific Gravity Urine 1.007 1.003 - 1.035    Blood Urine Negative Negative    pH Urine 6.0 4.7 - 8.0    Protein Albumin Urine Negative Negative mg/dL    Urobilinogen Urine Normal Normal, 2.0 mg/dL    Nitrite Urine Negative Negative    Leukocyte Esterase Urine Negative Negative    Bacteria Urine Few (A) None Seen /HPF    Mucus Urine Present (A) None Seen /LPF    RBC Urine 1 <=2 /HPF    WBC Urine <1 <=5 /HPF    Narrative    Urine Culture not indicated   Urine Culture    Result Value Ref Range    Culture Culture in progress    Urine Drugs of Abuse Screen Panel 13   Result Value Ref Range    Cannabinoids (58-rmj-1-carboxy-9-THC) Not Detected Not Detected, Indeterminate      Comment:      Cutoff for a negative cannabinoid is 50 ng/mL or less.    Phencyclidine Not Detected Not Detected, Indeterminate      Comment:      Cutoff for a negative PCP is 25 ng/mL or less.    Cocaine (Benzoylecgonine) Not Detected Not Detected, Indeterminate      Comment:      Cutoff for a negative cocaine is 150 ng/ml or less.    Methamphetamine (d-Methamphetamine) Not Detected Not Detected, Indeterminate      Comment:      Cutoff for a negative methamphetamine is 500 ng/ml or less.    Opiates (Morphine) Not Detected Not Detected, Indeterminate      Comment:      Cutoff for a negative opiate is 100 ng/ml or less.    Amphetamine (d-Amphetamine) Not Detected Not Detected, Indeterminate      Comment:      Cutoff for a negative amphetamine is 500 ng/mL or less.    Benzodiazepines (Nordiazepam) Not Detected Not Detected, Indeterminate      Comment:      Cutoff for a negative benzodiazepine is 150 ng/ml or less.    Tricyclic Antidepressants (Desipramine) Not Detected Not Detected, Indeterminate      Comment:      Cutoff for a negative tricyclic antidepressant is 300 ng/ml or less.    Methadone Not Detected Not Detected, Indeterminate      Comment:      Cutoff for a negative methadone is 200 ng/ml or less.    Barbiturates (Butalbital) Not Detected Not Detected, Indeterminate      Comment:      Cutoff for a negative barbituate is 200 ng/ml or less.    Oxycodone Not Detected Not Detected, Indeterminate      Comment:      Cutoff for a negative oxycodone is 100 ng/mL or less.    Propoxyphene (Norpropoxyphene) Not Detected Not Detected, Indeterminate      Comment:      Cutoff for a negative propoxyphene is 300 ng/ml or less.    Buprenorphine Not Detected Not Detected, Indeterminate      Comment:      Cutoff for a  negative buprenorphine is 10 ng/ml or less.   Hepatitis C antibody   Result Value Ref Range    Hepatitis C Antibody Nonreactive Nonreactive    Narrative    Assay performance characteristics have not been established for newborns, infants, and children.   Adult Type and Screen   Result Value Ref Range    ABO/RH(D) A POS     Antibody Screen Negative Negative    SPECIMEN EXPIRATION DATE [id number]        If you have any questions or concerns, please call the clinic at the number listed above.       Sincerely,      Ethan Mesa MD/  Alcira CARR

## 2021-07-21 LAB
HBV SURFACE AG SERPL QL IA: NONREACTIVE
HCV AB SERPL QL IA: NONREACTIVE
HIV 1+2 AB+HIV1 P24 AG SERPL QL IA: NONREACTIVE
RUBV IGG SERPL QL IA: 1.43 INDEX
RUBV IGG SERPL QL IA: POSITIVE
T PALLIDUM AB SER QL: NONREACTIVE

## 2021-07-21 NOTE — PROGRESS NOTES
"  HPI:  Candis Marcos is a 25 year old female  No LMP recorded. Patient is pregnant. at 7w1d, Estimated Date of Delivery: Mar 8, 2022.  She denies vaginal bleeding, vomiting and abdominal pain.   No other c/o.  See my prior eval.  Early pregnancy with uncertain dates/viability presents for F/u US.     No past medical history on file.    No past surgical history on file.    Allergies: Patient has no known allergies.     ROS:   Denies fever, wt loss   Neg /GI other than per HPI      EXAM:  Blood pressure 114/74, pulse 96, height 1.626 m (5' 4\"), weight 69.9 kg (154 lb), SpO2 98 %, not currently breastfeeding.   BMI= Body mass index is 26.43 kg/m .  General - pleasant female in no acute distress.  Abdomen - soft, nontender, nondistended, no hepatosplenomegaly.  Pelvic - EG: normal adult female, BUS: within normal limits,Rectovaginal - deferred.    US:    Transvaginal:Yes  Yolk sac present:Yes  CRL:  95mm  FCA present:Yes  EGA 7w 0d  GIORGIO:3/8/22          ASSESSMENT/PLAN:  (Z34.91) Encounter for supervision of normal pregnancy in first trimester  (primary encounter diagnosis)  Comment: Viable IUP with uncertain dates.  Use US GIORGIO.   Plan: ABO/Rh type and screen, CBC with platelets,         Hepatitis B surface antigen, HIV Antigen         Antibody Combo, Rubella Antibody IgG, Treponema        Abs w Reflex to RPR and Titer, UA with         Microscopic reflex to Culture, Urine Culture,         Urine Drugs of Abuse Screen Panel 13, Hepatitis        C antibody, Chlamydia trachomatis/Neisseria         gonorrhoeae by PCR, Chlamydia         trachomatis/Neisseria gonorrhoeae by PCR           1st Trimester precautions and testing discussed.  New OB Labs ordered and exam scheduled.  Aneuploidy testing options discussed.  Patient has my card and phone number to call prn if problems in interim.    Ethan Mesa MD  "

## 2021-07-22 LAB — BACTERIA UR CULT: NORMAL

## 2021-07-28 ENCOUNTER — TELEPHONE (OUTPATIENT)
Dept: OBGYN | Facility: OTHER | Age: 26
End: 2021-07-28

## 2021-07-28 DIAGNOSIS — O21.9 PREGNANCY RELATED NAUSEA AND VOMITING, ANTEPARTUM: Primary | ICD-10-CM

## 2021-07-28 RX ORDER — ONDANSETRON 4 MG/1
4 TABLET, ORALLY DISINTEGRATING ORAL EVERY 6 HOURS PRN
Qty: 40 TABLET | Refills: 1 | Status: SHIPPED | OUTPATIENT
Start: 2021-07-28 | End: 2022-03-17

## 2021-07-28 NOTE — TELEPHONE ENCOUNTER
Pt is newly pregnant and has been nauseated every day all day long. Has tried unisom and it makes her feel to tired. Wants to know if she could get some zofran sent to [facility] in [place]. Please advise

## 2021-08-05 ENCOUNTER — HOSPITAL ENCOUNTER (EMERGENCY)
Facility: HOSPITAL | Age: 26
Discharge: HOME | End: 2021-08-05
Attending: EMERGENCY MEDICINE | Admitting: EMERGENCY MEDICINE
Payer: COMMERCIAL

## 2021-08-05 VITALS
WEIGHT: 159.06 LBS | OXYGEN SATURATION: 99 % | TEMPERATURE: 98.1 F | SYSTOLIC BLOOD PRESSURE: 108 MMHG | DIASTOLIC BLOOD PRESSURE: 64 MMHG | RESPIRATION RATE: 16 BRPM | BODY MASS INDEX: 27.3 KG/M2 | HEART RATE: 76 BPM

## 2021-08-05 DIAGNOSIS — N30.90 CYSTITIS: ICD-10-CM

## 2021-08-05 DIAGNOSIS — R30.0 DYSURIA: ICD-10-CM

## 2021-08-05 LAB
ALBUMIN UR-MCNC: NEGATIVE MG/DL
APPEARANCE UR: ABNORMAL
BACTERIA #/AREA URNS HPF: ABNORMAL /HPF
BILIRUB UR QL STRIP: NEGATIVE
COLOR UR AUTO: ABNORMAL
GLUCOSE UR STRIP-MCNC: NEGATIVE MG/DL
HGB UR QL STRIP: ABNORMAL
HOLD SPECIMEN: NORMAL
KETONES UR STRIP-MCNC: NEGATIVE MG/DL
LEUKOCYTE ESTERASE UR QL STRIP: ABNORMAL
MUCOUS THREADS #/AREA URNS LPF: PRESENT /LPF
NITRATE UR QL: NEGATIVE
PH UR STRIP: 7 [PH] (ref 4.7–8)
RBC URINE: 6 /HPF
SP GR UR STRIP: 1.01 (ref 1–1.03)
UROBILINOGEN UR STRIP-MCNC: NORMAL MG/DL
WBC URINE: >182 /HPF

## 2021-08-05 PROCEDURE — 81001 URINALYSIS AUTO W/SCOPE: CPT | Performed by: EMERGENCY MEDICINE

## 2021-08-05 PROCEDURE — 99283 EMERGENCY DEPT VISIT LOW MDM: CPT | Performed by: EMERGENCY MEDICINE

## 2021-08-05 PROCEDURE — 87086 URINE CULTURE/COLONY COUNT: CPT | Performed by: EMERGENCY MEDICINE

## 2021-08-05 PROCEDURE — 99283 EMERGENCY DEPT VISIT LOW MDM: CPT

## 2021-08-05 RX ORDER — CEPHALEXIN 500 MG/1
500 CAPSULE ORAL 2 TIMES DAILY
Qty: 14 CAPSULE | Refills: 0 | Status: SHIPPED | OUTPATIENT
Start: 2021-08-05 | End: 2021-08-12

## 2021-08-05 ASSESSMENT — ENCOUNTER SYMPTOMS
CARDIOVASCULAR NEGATIVE: 1
MUSCULOSKELETAL NEGATIVE: 1
DYSURIA: 1
EYES NEGATIVE: 1
RESPIRATORY NEGATIVE: 1
GASTROINTESTINAL NEGATIVE: 1
CONSTITUTIONAL NEGATIVE: 1
FREQUENCY: 1
NEUROLOGICAL NEGATIVE: 1

## 2021-08-05 NOTE — ED TRIAGE NOTES
Patient reports last night when urinating last night she felt burning. Patient reports she has increased frequency as well. Patient is pregnant at this time.

## 2021-08-05 NOTE — ED NOTES
Face to face report given with opportunity to observe patient.    Report given to MARITZA Sotelo RN   8/5/2021  7:04 AM

## 2021-08-05 NOTE — ED PROVIDER NOTES
History     Chief Complaint   Patient presents with     Rule out Urinary Tract Infection     HPI  Candis Marcos is a 26 year old female who states she is 9 weeks of gestation with her first pregnancy.  She states last night she developed dysuria.  She has urinary frequency and the discomfort.  She is concerned that she may have a urinary tract infection.  She denies any back pain.  She is not had fevers or chills.  She said no nausea or vomiting.  She has had no vaginal bleeding or discharge.    Allergies:  No Known Allergies    Problem List:    There are no problems to display for this patient.       Past Medical History:    History reviewed. No pertinent past medical history.    Past Surgical History:    History reviewed. No pertinent surgical history.    Family History:    History reviewed. No pertinent family history.    Social History:  Marital Status:   [2]  Social History     Tobacco Use     Smoking status: Never Smoker     Smokeless tobacco: Never Used   Substance Use Topics     Alcohol use: No     Drug use: No        Medications:    cephALEXin (KEFLEX) 500 MG capsule  ondansetron (ZOFRAN-ODT) 4 MG ODT tab  Prenatal Vit-Fe Fumarate-FA (PRENATAL VITAMINS PO)          Review of Systems   Constitutional: Negative.    HENT: Negative.    Eyes: Negative.    Respiratory: Negative.    Cardiovascular: Negative.    Gastrointestinal: Negative.    Genitourinary: Positive for dysuria and frequency.   Musculoskeletal: Negative.    Neurological: Negative.    Please see history of chief complaint.  All other systems reviewed and found unremarkable.    Physical Exam   BP: 108/64  Pulse: 76  Temp: 98.1  F (36.7  C)  Resp: 16  Weight: 72.1 kg (159 lb 1 oz)  SpO2: 99 %      Physical Exam this is a 26-year-old female who is awake alert oriented person place and time.  She is very pleasant and cooperative with my exam.  HEENT normocephalic extraocular muscles intact pupils equally round and reactive to light and  oropharynx is clear.  Neck is supple his range of motion with pain.  Lungs are clear bilaterally.  Heart retains regular rate and rhythm.  Abdomen is soft and nontender no rebound or involuntary guarding.  No flank tenderness.  Extremities a full range of motion no edema.  Neurologic exam no focal deficit is noted.  Dermatologic exam no diffuse skin rashes or lesions.  ED Course        Patient remained very stable throughout her stay in the department.  She will be discharged with a prescription for antibiotics.  She is given appropriate discharge instructions and I will advise her to be reassessed by her primary care provider soon as possible this coming week.                    Results for orders placed or performed during the hospital encounter of 08/05/21 (from the past 24 hour(s))   UA with Microscopic reflex to Culture    Specimen: Urine, Midstream   Result Value Ref Range    Color Urine Light Yellow Colorless, Straw, Light Yellow, Yellow    Appearance Urine Slightly Cloudy (A) Clear    Glucose Urine Negative Negative mg/dL    Bilirubin Urine Negative Negative    Ketones Urine Negative Negative mg/dL    Specific Gravity Urine 1.013 1.003 - 1.035    Blood Urine Small (A) Negative    pH Urine 7.0 4.7 - 8.0    Protein Albumin Urine Negative Negative mg/dL    Urobilinogen Urine Normal Normal, 2.0 mg/dL    Nitrite Urine Negative Negative    Leukocyte Esterase Urine Large (A) Negative    Bacteria Urine Few (A) None Seen /HPF    Mucus Urine Present (A) None Seen /LPF    RBC Urine 6 (H) <=2 /HPF    WBC Urine >182 (H) <=5 /HPF    Narrative    Urine Culture ordered based on laboratory criteria   [place] Draw    Narrative    The following orders were created for panel order [place] Draw.  Procedure                               Abnormality         Status                     ---------                               -----------         ------                     Extra Urine Collection[[id number]]                           In  process                   Please view results for these tests on the individual orders.       Medications - No data to display    Assessments & Plan (with Medical Decision Making)     I have reviewed the nursing notes.    I have reviewed the findings, diagnosis, plan and need for follow up with the patient.  The plan is to discharge the patient with appropriate prescriptions, discharge and follow-up instructions.    New Prescriptions    CEPHALEXIN (KEFLEX) 500 MG CAPSULE    Take 1 capsule (500 mg) by mouth 2 times daily for 7 days       Final diagnoses:   Dysuria   Cystitis       8/5/2021   [facility]     Brody Aguirre,   08/05/21 [id number]

## 2021-08-07 LAB
BACTERIA UR CULT: ABNORMAL
BACTERIA UR CULT: ABNORMAL

## 2021-08-07 RX ORDER — NITROFURANTOIN 25; 75 MG/1; MG/1
100 CAPSULE ORAL 2 TIMES DAILY
Qty: 10 CAPSULE | Refills: 0 | Status: SHIPPED | OUTPATIENT
Start: 2021-08-07 | End: 2021-08-12

## 2021-08-19 ENCOUNTER — LAB (OUTPATIENT)
Dept: LAB | Facility: OTHER | Age: 26
End: 2021-08-19
Attending: NURSE PRACTITIONER
Payer: COMMERCIAL

## 2021-08-19 ENCOUNTER — PRENATAL OFFICE VISIT (OUTPATIENT)
Dept: OBGYN | Facility: OTHER | Age: 26
End: 2021-08-19
Attending: NURSE PRACTITIONER
Payer: COMMERCIAL

## 2021-08-19 VITALS
WEIGHT: 156.7 LBS | DIASTOLIC BLOOD PRESSURE: 62 MMHG | SYSTOLIC BLOOD PRESSURE: 109 MMHG | HEIGHT: 64 IN | BODY MASS INDEX: 26.75 KG/M2 | OXYGEN SATURATION: 99 % | HEART RATE: 102 BPM

## 2021-08-19 DIAGNOSIS — Z12.4 SCREENING FOR CERVICAL CANCER: Primary | ICD-10-CM

## 2021-08-19 DIAGNOSIS — Z34.01 ENCOUNTER FOR SUPERVISION OF NORMAL FIRST PREGNANCY IN FIRST TRIMESTER: ICD-10-CM

## 2021-08-19 DIAGNOSIS — Z34.01 NORMAL FIRST PREGNANCY IN FIRST TRIMESTER: ICD-10-CM

## 2021-08-19 PROCEDURE — G0145 SCR C/V CYTO,THINLAYER,RESCR: HCPCS | Performed by: NURSE PRACTITIONER

## 2021-08-19 PROCEDURE — 87591 N.GONORRHOEAE DNA AMP PROB: CPT

## 2021-08-19 PROCEDURE — 87491 CHLMYD TRACH DNA AMP PROBE: CPT

## 2021-08-19 PROCEDURE — 99207 PR PRENATAL VISIT: CPT | Performed by: NURSE PRACTITIONER

## 2021-08-19 ASSESSMENT — MIFFLIN-ST. JEOR: SCORE: 1435.79

## 2021-08-19 ASSESSMENT — PAIN SCALES - GENERAL: PAINLEVEL: NO PAIN (0)

## 2021-08-19 NOTE — PROGRESS NOTES
Have you had or do you currently have:    - Diabetes? N  - Hypertension? N  - Heart disease, mitral valve prolapse, or rheumatic fever?  N  - An autoimmune disease such as lupus or rheumatoid arthritis?  N  - Kidney disease, urinary tract infection?  N  - Epilepsy, seizures, or spells?  N  - Migraine headaches?  N  - Any other neurological problems?  N  - Have you ever been treated for depression?  N  - Are you having problems with crying spells or loss of self-esteem?  N  - Have you ever required psychiatric care?  N  - Have you ever had hepatitis, liver disease, or jaundice?  N  - Have you ever been treated for blood clots in your veins, deep vein thrombosis, inflammation in the veins, thrombosis, phelbitis, pulmonary embolism or varicosities?  N  - Have you had excessive bleeding after surgery or dental work?  N  - Do you bleed more than other women after a cut or scratch?  N  - Do you have a history or anemia?  N  - Have you ever had thyroid problems or take thyroid medication?  N  - Do you have any endocrine problems?  N  - Have you every been in a major accident or suffered serious trauma?  N  - Within the last year, has anyone hit, slapped, kicked, or otherwise hurt you?  N  - In the last year, has anyone forced you to have sex when you didn't want to?  N  - Have you every received a blood transfusion?  N  - Would you refuse a blood transfusion if a doctor judged it to be medically necessary?  N  - Does anyone in your home smoke? N  - Do you use tobacco products?  N  - Do you drink beer, wine, or hard liquor?  N  - Do you use any of the following: marijuana, speed, cocaine, heroin, hallucinogens, or other drugs?  N  - Is your blood type RH negative?  N  - Have you ever had asthma?  N  - Have you ever had tuberculosis?  N  - Do you have any allergies to drugs or over-the-counter medications?  N  - Allergies: dust mites, aspartame, ethanol, venlafaxine hydrochloride, sertraline?  N  - Have you had any breast  problems?  N  - Have you ever breast-fed?  N  - Have you had any gynecological surgical procedures such as cervical conization, LEEP, laser treatment, cryosurgery of the cervix, or a dilatation and curettage, etc?  N  - Have you ever had any other surgical procedures?  N  - Have you ever had any anesthetic complications?  N  - Have you ever had an abnormal pap smear?  N  - Do you have a history of abnormalities of the uterus? N  - Did your mother take NANCY or any other hormones when she was pregnant with you?  N  - Did it take you more than one year to become pregnant?  N  - Have you ever been evaluated or treated for infertility?  N  - Is there a history of medical problems in your family, which you feel might adversely affect your health or pregnancy?  Y, HEART ISSUES  - Do you have any other problems we have not asked about which you feel may be important to this pregnancy?  N    Symptoms since last menstrual period  - Do you currently have any of the following symptoms: abdominal pain, blood in the stool or urine, chest pain, shortness of breath, coughing or vomiting up blood, you heart is racing or skipping beats, nausea and vomiting, pain on urination, or vaginal discharge or bleeding?  N    Genetic screening  Has the patient, baby's father, or anyone in either family had:  - Thalassemia (Italian, Greek, Mediterranean, or  background only) and an MCV result less than 80?  N  - Neural tube defect such as meningomyelocele, spina bifida, or anencephaly?  N  - Congential heart defect?  N  - Down's syndrome?  N  - Sidney-Sachs disease ( Hindu, Cajun, [place]ian-[place])?  N  - Sickle cell disease or trait () ?  N  - Hemophilia or other inherited problems of blood?  N  - Muscular dystrophy?  N  - Cystic fibrosis?  N  - Jane's chorea?  N  - Mental retardation/autism?  N   If yes, was the person tested for Fragile X?  N  - Any other inherited genetic or chromosomal disorder?  N  - Maternal metabolic  disorder (e.g. insulin- dependent diabetes, PKU)?  N  - A child with birth defects not listed above?  N  - Recurrent pregnancy loss, or a stillbirth?  N  - Has the patient had any medications/street drugs/alcohol since her last menstrual period?  Y, ALCOHOL   - Does the patient or baby's father have any other genetic risk?  N    Infection history  - Have you ever been treated for tuberculosis?  N  - Have you every had a positive skin test for tuberculosis?  N  - Do you live with someone who has tuberculosis?  N  - Have you ever been exposed to tuberculosis?  N  - Do you have genital herpes?  N  - Does your partner have genital herpes?  N  - Have you had a rash or viral illness since your last period?  N  - Have you ever had gonorrhea, chlamydia, syphilis, venereal warts, trichomoniasis, pelvic inflammatory disease, or any other sexually transmitted disease?  N  - Have you had chicken pox?  N  - Have you been vaccinated against chicken pox?  Y  - Have you had any other infectious diseases?  N

## 2021-08-19 NOTE — NURSING NOTE
"Chief Complaint   Patient presents with     Prenatal Care     New OB 11 weeks 2 days       Initial /62 (BP Location: Left arm, Patient Position: Sitting, Cuff Size: Adult Regular)   Pulse 102   Ht 1.626 m (5' 4\")   Wt 71.1 kg (156 lb 11.2 oz)   SpO2 99%   BMI 26.90 kg/m   Estimated body mass index is 26.9 kg/m  as calculated from the following:    Height as of this encounter: 1.626 m (5' 4\").    Weight as of this encounter: 71.1 kg (156 lb 11.2 oz).  Medication Reconciliation: complete     Isela Cruz LPN    "

## 2021-08-20 LAB
C TRACH DNA SPEC QL PROBE+SIG AMP: NEGATIVE
N GONORRHOEA DNA SPEC QL NAA+PROBE: NEGATIVE

## 2021-08-20 ASSESSMENT — PATIENT HEALTH QUESTIONNAIRE - PHQ9: SUM OF ALL RESPONSES TO PHQ QUESTIONS 1-9: 1

## 2021-08-20 NOTE — PATIENT INSTRUCTIONS
Patient Education     Adapting to Pregnancy: First Trimester  As your body adjusts during your first trimester of pregnancy, you may have to change or limit your daily activities. You ll need more rest. You may also need to use the energy you have more wisely.   Your changing body  Almost every part of your body is affected as you adapt to pregnancy. The uterus and cervix will start to soften right away. You may not look very pregnant during the first 3 months. But you are likely to have some common signs of early pregnancy:     Nausea    Fatigue    Frequent urination    Mood swings    Bloating of the belly    Constipation    Heartburn    Missed or light periods (first trimester bleeding)    Nipple or breast tenderness and breast swelling  It s not too late to start good habits   What matters most is protecting your baby from this moment on. If you smoke, drink alcohol, or use drugs, now is the time to stop. If you need help, talk with your healthcare provider:     Smoking increases the risk of stillbirth or having a low-birth-weight baby. If you smoke, quit now.    Alcohol and drugs have been linked with miscarriage, birth defects, intellectual disability, and low birth weight. Don't drink alcohol or take drugs.  Tips to relieve nausea  During pregnancy, nausea can happen at any time of the day, but it may be worse in the morning. To help prevent nausea:     Eat small, light meals at frequent intervals.    Drink fluids often.    Get up slowly. Eat a few unsalted crackers before you get out of bed.    Avoid smells that bother you.    Avoid spicy and fatty foods.    Eat an ice pop in your favorite flavor.    Get plenty of rest.    Ask your healthcare provider about taking devin or vitamin B6 for nausea and vomiting.    Talk with your healthcare provider if you take vitamins that upset your stomach.   Work concerns  The end of the first trimester is a good time to discuss working during pregnancy with your  "employer. Follow your healthcare provider s advice if your job needs you to stand for a long time, work with hazardous tools, or even sit at a desk all day. Your workspace, workload, or scheduled hours may need to be adjusted. Perhaps you can change body postures more often or take an extra break.   Advice for travel  Talk to your healthcare provider first, but the second trimester may be the best time for any travel. You may be advised to avoid certain trips while you re pregnant. Food and water can be concerns in developing countries. Travel by car is a good choice, as you can stop, get out, and stretch. Bring snacks and water along. Fasten the lap belt below your belly, low over your hips. Also be sure to wear the shoulder harness.   Intimacy  Unless your healthcare provider tells you to, there's no reason to stop having sex while you re pregnant. You or your partner may notice changes in desire. Desire may be less in the first trimester, due to nausea and fatigue. In the second trimester, sex may be very enjoyable. The third trimester can be a challenge comfort-wise. Try different positions and see what s best for you both.   [employer] last reviewed this educational content on 4/1/2020 2000-2021 [employer]. All rights reserved. This information is not intended as a substitute for professional medical care. Always follow your healthcare professional's instructions.           Patient Education     Pregnancy: Your First Trimester Changes  The first trimester is a time of rapid development for your baby. Because your baby is growing so quickly, it is important that you start a healthy lifestyle right away. By the end of the first trimester, your baby has formed all of its major body organs and weighs just over an ounce.      Actual size of baby is 1/4\"    Month 1 (weeks 1 to 4)  The placenta (the organ that nourishes your baby) begins to form. The brain, spinal cord, heart, gastrointestinal " "tract, and lungs begin to develop. Your baby is about   inch long by the end of the first month.      Actual size of baby is 1\"    Month 2 (weeks 5 to 8)  All of your baby s major body organs form. The face, fingers, toes, ears, and eyes appear. By the end of the month, your baby is about 1 inch long.      Actual size of baby is 3\"      Month 3 (weeks 9 to 12)  Your baby can open and close its fists and mouth. The sexual organs begin to form. As the first trimester ends, your baby is about 3 inches long.   [employer] last reviewed this educational content on 8/1/2020 2000-2021 [employer]. All rights reserved. This information is not intended as a substitute for professional medical care. Always follow your healthcare professional's instructions.           Patient Education     What Is Prenatal Care?  Before getting pregnant, you may have adopted good health habits to get ready for your baby. But if you didn t, start today. One of the first steps is learning how to take care of yourself. See your healthcare provider as soon as you think you may be pregnant. Then continue prenatal care during your pregnancy.     Prenatal care helps you have a healthy baby   During prenatal care:    Your healthcare provider evaluates the health of your pregnancy. Your provider will calculate a due date. This gives an estimate of your baby's delivery. Many women give birth between 38 and 41 weeks of pregnancy. Your due date is found by counting 40 weeks from the first day of your last menstrual period.    The progress of your pregnancy is checked. This includes your baby s growth, fetal heart rate, changes in your weight and blood pressure, and your overall health and comfort.    Your provider may find new concerns and manage existing ones before problems happen.    Your provider will check lab work through blood and urine.    Your provider will discuss normal changes that happen during pregnancy. They will also talk " about changes that may not be normal. And they'll advise you about lifestyle changes.    Your provider will answer your questions. They will also help you get ready for labor and delivery.  You are part of a team  When you re pregnant, you re part of a team. This team includes you, your baby, and your healthcare provider. Your team also may include a partner or a main support person. That could be a loved one, such as a spouse, a family member, or a friend. As you work toward giving your baby a healthy start, rely on your team members for support.   It s not too late to start good habits   What matters most is protecting your baby from this moment on. If you smoke, drink alcohol, or use drugs, now is the time to stop. If you need help, talk with your healthcare provider:     Smoking increases the risk of losing your baby or having a low-birth-weight baby. If you smoke, quit now.    Alcohol and drugs have been linked with miscarriage, birth defects, intellectual disability, and low birth weight. Stay away from alcohol and drugs.    Eat a healthy diet. This helps keep you and your baby strong and healthy. Follow your healthcare provider's instructions for nutrition. Also stay within the guidelines you are given for healthy weight gain.    Take 400 micrograms to 800 micrograms (400 mcg to 800 mcg or 0.4 mg to 0.8 mg) of folic acid every day. Take it for at least 1 month before getting pregnant. And keep taking it for the first trimester of your pregnancy. This is to lower your risk of some brain and spinal birth defects. You can get folic acid from some foods. But it's hard to get all the folic acid you will need from foods alone. Talk with your provider about taking a folic acid supplement.    Regular exercise will help you stay fit and feel good during pregnancy. It can also help prevent or reduce back pain. Talk with your provider about how to exercise safely during pregnancy.    If you have a health condition, be  sure it is under control. Some conditions include asthma, diabetes, depression, high blood pressure, obesity, thyroid disease, or epilepsy. Be sure your vaccines are up to date.  [employer] last reviewed this educational content on 8/1/2020 2000-2021 [employer]. All rights reserved. This information is not intended as a substitute for professional medical care. Always follow your healthcare professional's instructions.

## 2021-08-20 NOTE — PROGRESS NOTES
"  HPI:  Candis Marcos is a 26 year old female No LMP recorded. Patient is pregnant. at 11w3d, Estimated Date of Delivery: Mar 8, 2022.  She denies vaginal bleeding, vomiting, abdominal pain and headaches. Mild fatigue. In nursing school.    No other c/o.    No past medical history on file.    No past surgical history on file.    Allergies: Patient has no known allergies.     EXAM:  Blood pressure 109/62, pulse 102, height 1.626 m (5' 4\"), weight 71.1 kg (156 lb 11.2 oz), SpO2 99 %, not currently breastfeeding.   BMI= Body mass index is 26.9 kg/m .  General - pleasant female in no acute distress.  Neck - supple without lymphadenopathy or thyromegaly.  Lungs - clear to auscultation bilaterally.  Heart - regular rate and rhythm without murmur.  Abdomen - soft, nontender, nondistended, no hepatosplenomegaly.  Pelvic - EG: normal adult female, BUS: within normal limits, Vagina: well rugated, no discharge, Cervix: no lesions or CMT, closed/long Uterus: gravid, consistant with dates, mobile, Adnexae: no masses or tenderness.  Rectovaginal - deferred.  Musculoskeletal - no gross deformities.  Neurological - normal strength, sensation, and mental status.    Doptones were 168    ASSESSMENT/PLAN:  (Z12.4) Screening for cervical cancer  (primary encounter diagnosis)  Comment:   Plan: A pap thin layer screen reflex to HPV if ASCUS         - recommend age 25 - 29            (Z34.01) Normal first pregnancy in first trimester  Comment:   Plan: return in 4 weeks.       Weight gain and exercise during pregnancy was discussed at today's visit.  The patient will return to clinic in 4 weeks for continued prenatal care.  "

## 2021-08-24 ENCOUNTER — MYC MEDICAL ADVICE (OUTPATIENT)
Dept: OBGYN | Facility: OTHER | Age: 26
End: 2021-08-24

## 2021-08-24 LAB
BKR LAB AP GYN ADEQUACY: NORMAL
BKR LAB AP GYN INTERPRETATION: NORMAL
BKR LAB AP HPV REFLEX: NORMAL
BKR LAB AP PREVIOUS ABNORMAL: NORMAL
PATH REPORT.COMMENTS IMP SPEC: NORMAL
PATH REPORT.RELEVANT HX SPEC: NORMAL

## 2021-08-25 NOTE — TELEPHONE ENCOUNTER
Yes that's fine.  If symptoms do not improve or worsen let us know and we can have you come in for a urinalysis.

## 2021-08-31 ENCOUNTER — PRENATAL OFFICE VISIT (OUTPATIENT)
Dept: OBGYN | Facility: OTHER | Age: 26
End: 2021-08-31
Attending: NURSE PRACTITIONER
Payer: COMMERCIAL

## 2021-08-31 ENCOUNTER — LAB (OUTPATIENT)
Dept: LAB | Facility: OTHER | Age: 26
End: 2021-08-31
Attending: NURSE PRACTITIONER
Payer: COMMERCIAL

## 2021-08-31 VITALS
WEIGHT: 157.1 LBS | SYSTOLIC BLOOD PRESSURE: 101 MMHG | OXYGEN SATURATION: 100 % | HEIGHT: 64 IN | HEART RATE: 80 BPM | DIASTOLIC BLOOD PRESSURE: 63 MMHG | BODY MASS INDEX: 26.82 KG/M2

## 2021-08-31 DIAGNOSIS — O23.42 UTI (URINARY TRACT INFECTION) DURING PREGNANCY, SECOND TRIMESTER: ICD-10-CM

## 2021-08-31 DIAGNOSIS — R30.0 DYSURIA: Primary | ICD-10-CM

## 2021-08-31 DIAGNOSIS — R30.0 DYSURIA: ICD-10-CM

## 2021-08-31 LAB
ALBUMIN UR-MCNC: 10 MG/DL
APPEARANCE UR: CLEAR
BACTERIA #/AREA URNS HPF: ABNORMAL /HPF
BILIRUB UR QL STRIP: NEGATIVE
CLUE CELLS: ABNORMAL
COLOR UR AUTO: YELLOW
GLUCOSE UR STRIP-MCNC: NEGATIVE MG/DL
HGB UR QL STRIP: NEGATIVE
KETONES UR STRIP-MCNC: 10 MG/DL
LEUKOCYTE ESTERASE UR QL STRIP: ABNORMAL
MUCOUS THREADS #/AREA URNS LPF: PRESENT /LPF
NITRATE UR QL: NEGATIVE
PH UR STRIP: 6 [PH] (ref 4.7–8)
RBC URINE: 1 /HPF
SP GR UR STRIP: 1.02 (ref 1–1.03)
TRICHOMONAS, WET PREP: ABNORMAL
UROBILINOGEN UR STRIP-MCNC: NORMAL MG/DL
WBC URINE: 26 /HPF
WBC'S/HIGH POWER FIELD, WET PREP: ABNORMAL
YEAST, WET PREP: ABNORMAL

## 2021-08-31 PROCEDURE — 87088 URINE BACTERIA CULTURE: CPT | Performed by: NURSE PRACTITIONER

## 2021-08-31 PROCEDURE — 87186 SC STD MICRODIL/AGAR DIL: CPT | Performed by: NURSE PRACTITIONER

## 2021-08-31 PROCEDURE — 99207 PR PRENATAL VISIT: CPT | Performed by: NURSE PRACTITIONER

## 2021-08-31 PROCEDURE — 87210 SMEAR WET MOUNT SALINE/INK: CPT | Performed by: NURSE PRACTITIONER

## 2021-08-31 PROCEDURE — 87086 URINE CULTURE/COLONY COUNT: CPT | Performed by: NURSE PRACTITIONER

## 2021-08-31 PROCEDURE — 81001 URINALYSIS AUTO W/SCOPE: CPT

## 2021-08-31 RX ORDER — AMOXICILLIN 500 MG/1
500 CAPSULE ORAL 3 TIMES DAILY
Qty: 21 CAPSULE | Refills: 0 | Status: SHIPPED | OUTPATIENT
Start: 2021-08-31 | End: 2021-09-07

## 2021-08-31 ASSESSMENT — PAIN SCALES - GENERAL: PAINLEVEL: NO PAIN (0)

## 2021-08-31 ASSESSMENT — MIFFLIN-ST. JEOR: SCORE: 1437.6

## 2021-08-31 NOTE — NURSING NOTE
"Chief Complaint   Patient presents with     UTI       Initial /63 (BP Location: Right arm, Patient Position: Sitting, Cuff Size: Adult Regular)   Pulse 80   Ht 1.626 m (5' 4\")   Wt 71.3 kg (157 lb 1.6 oz)   SpO2 100%   BMI 26.97 kg/m   Estimated body mass index is 26.97 kg/m  as calculated from the following:    Height as of this encounter: 1.626 m (5' 4\").    Weight as of this encounter: 71.3 kg (157 lb 1.6 oz).  Medication Reconciliation: complete     Isela Cruz LPN    "

## 2021-09-01 NOTE — PROGRESS NOTES
Concerns of possible UTI.  Did recently complete antibiotics for same.  Frequency and mild dysuria. UA/UC and wet prep done.  Will start amoxicillin pending UC.  Consider daily prophylaxis if persistent UTI's.  No VB or cramping.  Return for next scheduled ob visit and as needed.

## 2021-09-03 DIAGNOSIS — N39.0 FREQUENT UTI: Primary | ICD-10-CM

## 2021-09-03 LAB — BACTERIA UR CULT: ABNORMAL

## 2021-09-17 ENCOUNTER — LAB (OUTPATIENT)
Dept: LAB | Facility: OTHER | Age: 26
End: 2021-09-17
Payer: COMMERCIAL

## 2021-09-17 ENCOUNTER — PRENATAL OFFICE VISIT (OUTPATIENT)
Dept: OBGYN | Facility: OTHER | Age: 26
End: 2021-09-17
Attending: OBSTETRICS & GYNECOLOGY
Payer: COMMERCIAL

## 2021-09-17 VITALS
HEIGHT: 64 IN | DIASTOLIC BLOOD PRESSURE: 68 MMHG | BODY MASS INDEX: 27.83 KG/M2 | WEIGHT: 163 LBS | SYSTOLIC BLOOD PRESSURE: 112 MMHG

## 2021-09-17 DIAGNOSIS — Z34.02 PREGNANCY, SUPERVISION OF FIRST, SECOND TRIMESTER: Primary | ICD-10-CM

## 2021-09-17 DIAGNOSIS — N39.0 FREQUENT UTI: ICD-10-CM

## 2021-09-17 LAB
ALBUMIN UR-MCNC: NEGATIVE MG/DL
APPEARANCE UR: CLEAR
BILIRUB UR QL STRIP: NEGATIVE
COLOR UR AUTO: NORMAL
GLUCOSE UR STRIP-MCNC: NEGATIVE MG/DL
HGB UR QL STRIP: NEGATIVE
KETONES UR STRIP-MCNC: NEGATIVE MG/DL
LEUKOCYTE ESTERASE UR QL STRIP: NEGATIVE
NITRATE UR QL: NEGATIVE
PH UR STRIP: 6.5 [PH] (ref 4.7–8)
SP GR UR STRIP: 1.01 (ref 1–1.03)
UROBILINOGEN UR STRIP-MCNC: NORMAL MG/DL

## 2021-09-17 PROCEDURE — 99207 PR PRENATAL VISIT: CPT | Performed by: OBSTETRICS & GYNECOLOGY

## 2021-09-17 PROCEDURE — 81003 URINALYSIS AUTO W/O SCOPE: CPT

## 2021-09-17 PROCEDURE — 87086 URINE CULTURE/COLONY COUNT: CPT

## 2021-09-17 ASSESSMENT — MIFFLIN-ST. JEOR: SCORE: 1464.36

## 2021-09-17 ASSESSMENT — PAIN SCALES - GENERAL: PAINLEVEL: NO PAIN (0)

## 2021-09-17 NOTE — NURSING NOTE
"Chief Complaint   Patient presents with     Prenatal Care     15 3/7       Initial /68 (BP Location: Left arm, Patient Position: Sitting, Cuff Size: Adult Regular)   Ht 1.626 m (5' 4\")   Wt 73.9 kg (163 lb)   BMI 27.98 kg/m   Estimated body mass index is 27.98 kg/m  as calculated from the following:    Height as of this encounter: 1.626 m (5' 4\").    Weight as of this encounter: 73.9 kg (163 lb).  Medication Reconciliation: complete  BETHANY NINO LPN  "

## 2021-09-17 NOTE — PROGRESS NOTES
Doing well.  No concerns today.  Discussed quad screening. Patient wishes to defer  US for full fetal anatomical survey ordered.  Return to clinic in 4 weeks    Ethan Mesa MD  9/17/2021

## 2021-09-19 ENCOUNTER — HEALTH MAINTENANCE LETTER (OUTPATIENT)
Age: 26
End: 2021-09-19

## 2021-09-19 LAB — BACTERIA UR CULT: NORMAL

## 2021-10-20 ENCOUNTER — HOSPITAL ENCOUNTER (OUTPATIENT)
Dept: ULTRASOUND IMAGING | Facility: HOSPITAL | Age: 26
Discharge: HOME | End: 2021-10-20
Attending: OBSTETRICS & GYNECOLOGY | Admitting: OBSTETRICS & GYNECOLOGY
Payer: COMMERCIAL

## 2021-10-20 DIAGNOSIS — Z34.02 PREGNANCY, SUPERVISION OF FIRST, SECOND TRIMESTER: ICD-10-CM

## 2021-10-20 PROCEDURE — 76805 OB US >/= 14 WKS SNGL FETUS: CPT

## 2021-10-28 ENCOUNTER — PRENATAL OFFICE VISIT (OUTPATIENT)
Dept: OBGYN | Facility: OTHER | Age: 26
End: 2021-10-28
Attending: NURSE PRACTITIONER
Payer: COMMERCIAL

## 2021-10-28 VITALS
BODY MASS INDEX: 28.65 KG/M2 | OXYGEN SATURATION: 100 % | DIASTOLIC BLOOD PRESSURE: 60 MMHG | HEART RATE: 107 BPM | SYSTOLIC BLOOD PRESSURE: 109 MMHG | WEIGHT: 167.8 LBS | HEIGHT: 64 IN

## 2021-10-28 DIAGNOSIS — Z23 NEED FOR PROPHYLACTIC VACCINATION AND INOCULATION AGAINST INFLUENZA: Primary | ICD-10-CM

## 2021-10-28 DIAGNOSIS — Z34.02 PREGNANCY, SUPERVISION OF FIRST, SECOND TRIMESTER: ICD-10-CM

## 2021-10-28 PROCEDURE — 99207 PR PRENATAL VISIT: CPT | Performed by: NURSE PRACTITIONER

## 2021-10-28 PROCEDURE — 90471 IMMUNIZATION ADMIN: CPT | Performed by: NURSE PRACTITIONER

## 2021-10-28 PROCEDURE — 90686 IIV4 VACC NO PRSV 0.5 ML IM: CPT | Performed by: NURSE PRACTITIONER

## 2021-10-28 ASSESSMENT — MIFFLIN-ST. JEOR: SCORE: 1486.14

## 2021-10-28 ASSESSMENT — PAIN SCALES - GENERAL: PAINLEVEL: NO PAIN (0)

## 2021-10-28 NOTE — NURSING NOTE
"Chief Complaint   Patient presents with     Prenatal Care     21 weeks 2 days       Initial /60 (BP Location: Right arm, Patient Position: Sitting, Cuff Size: Adult Regular)   Pulse 107   Ht 1.626 m (5' 4\")   Wt 76.1 kg (167 lb 12.8 oz)   SpO2 100%   BMI 28.80 kg/m   Estimated body mass index is 28.8 kg/m  as calculated from the following:    Height as of this encounter: 1.626 m (5' 4\").    Weight as of this encounter: 76.1 kg (167 lb 12.8 oz).  Medication Reconciliation: complete     Isela Cruz, LILLY    "

## 2021-10-28 NOTE — PROGRESS NOTES
Doing well.  Anatomy screen reviewed. Girl.  Baby active.  No cramping or spotting.  Discussed prenatal classes.  Questions answered on safe meds in pregnancy.  Return in 4 weeks.

## 2021-11-23 ENCOUNTER — PRENATAL OFFICE VISIT (OUTPATIENT)
Dept: OBGYN | Facility: OTHER | Age: 26
End: 2021-11-23
Attending: NURSE PRACTITIONER
Payer: COMMERCIAL

## 2021-11-23 VITALS
HEART RATE: 107 BPM | SYSTOLIC BLOOD PRESSURE: 126 MMHG | HEIGHT: 64 IN | DIASTOLIC BLOOD PRESSURE: 64 MMHG | BODY MASS INDEX: 30.34 KG/M2 | WEIGHT: 177.7 LBS | OXYGEN SATURATION: 98 %

## 2021-11-23 DIAGNOSIS — Z34.02 PREGNANCY, SUPERVISION OF FIRST, SECOND TRIMESTER: Primary | ICD-10-CM

## 2021-11-23 PROCEDURE — 99207 PR PRENATAL VISIT: CPT | Performed by: NURSE PRACTITIONER

## 2021-11-23 ASSESSMENT — MIFFLIN-ST. JEOR: SCORE: 1531.04

## 2021-11-23 ASSESSMENT — PAIN SCALES - GENERAL: PAINLEVEL: NO PAIN (0)

## 2021-11-23 NOTE — NURSING NOTE
"Chief Complaint   Patient presents with     Prenatal Care     25 weeks 0 days       Initial /64 (BP Location: Left arm, Patient Position: Sitting, Cuff Size: Adult Regular)   Pulse 107   Ht 1.626 m (5' 4\")   Wt 80.6 kg (177 lb 11.2 oz)   SpO2 98%   BMI 30.50 kg/m   Estimated body mass index is 30.5 kg/m  as calculated from the following:    Height as of this encounter: 1.626 m (5' 4\").    Weight as of this encounter: 80.6 kg (177 lb 11.2 oz).  Medication Reconciliation: complete     Isela Cruz, LILLY    "

## 2021-11-24 NOTE — PROGRESS NOTES
Doing well.  Denies concerns.  Baby active.  No cramping or VB.  Discussed and ordered 28 week labs for next visit.  Plan Tdap. Diet and exercise reviewed.  Return in 4 weeks.

## 2021-12-14 ENCOUNTER — APPOINTMENT (OUTPATIENT)
Dept: LAB | Facility: OTHER | Age: 26
End: 2021-12-14
Attending: NURSE PRACTITIONER
Payer: COMMERCIAL

## 2021-12-14 ENCOUNTER — PRENATAL OFFICE VISIT (OUTPATIENT)
Dept: OBGYN | Facility: OTHER | Age: 26
End: 2021-12-14
Attending: NURSE PRACTITIONER
Payer: COMMERCIAL

## 2021-12-14 VITALS
WEIGHT: 178 LBS | BODY MASS INDEX: 30.39 KG/M2 | OXYGEN SATURATION: 99 % | HEIGHT: 64 IN | SYSTOLIC BLOOD PRESSURE: 119 MMHG | HEART RATE: 100 BPM | DIASTOLIC BLOOD PRESSURE: 62 MMHG

## 2021-12-14 DIAGNOSIS — Z34.02 NORMAL FIRST PREGNANCY IN SECOND TRIMESTER: Primary | ICD-10-CM

## 2021-12-14 LAB
ANTIBODY SCREEN: NEGATIVE
ERYTHROCYTE [DISTWIDTH] IN BLOOD BY AUTOMATED COUNT: 12.4 % (ref 10–15)
GLUCOSE 1H P 50 G GLC PO SERPL-MCNC: 132 MG/DL (ref 70–129)
HCT VFR BLD AUTO: 32.9 % (ref 35–47)
HGB BLD-MCNC: 10.7 G/DL (ref 11.7–15.7)
MCH RBC QN AUTO: 29.6 PG (ref 26.5–33)
MCHC RBC AUTO-ENTMCNC: 32.5 G/DL (ref 31.5–36.5)
MCV RBC AUTO: 91 FL (ref 78–100)
PLATELET # BLD AUTO: 228 10E3/UL (ref 150–450)
RBC # BLD AUTO: 3.62 10E6/UL (ref 3.8–5.2)
SPECIMEN EXPIRATION DATE: NORMAL
WBC # BLD AUTO: 11.3 10E3/UL (ref 4–11)

## 2021-12-14 PROCEDURE — 86780 TREPONEMA PALLIDUM: CPT | Performed by: NURSE PRACTITIONER

## 2021-12-14 PROCEDURE — 82950 GLUCOSE TEST: CPT | Performed by: NURSE PRACTITIONER

## 2021-12-14 PROCEDURE — 99207 PR PRENATAL VISIT: CPT | Performed by: NURSE PRACTITIONER

## 2021-12-14 PROCEDURE — 85027 COMPLETE CBC AUTOMATED: CPT | Performed by: NURSE PRACTITIONER

## 2021-12-14 PROCEDURE — 36415 COLL VENOUS BLD VENIPUNCTURE: CPT | Performed by: NURSE PRACTITIONER

## 2021-12-14 PROCEDURE — 90715 TDAP VACCINE 7 YRS/> IM: CPT | Performed by: NURSE PRACTITIONER

## 2021-12-14 PROCEDURE — 90471 IMMUNIZATION ADMIN: CPT | Performed by: NURSE PRACTITIONER

## 2021-12-14 PROCEDURE — 86803 HEPATITIS C AB TEST: CPT | Performed by: NURSE PRACTITIONER

## 2021-12-14 PROCEDURE — 86850 RBC ANTIBODY SCREEN: CPT | Performed by: NURSE PRACTITIONER

## 2021-12-14 ASSESSMENT — PAIN SCALES - GENERAL: PAINLEVEL: NO PAIN (0)

## 2021-12-14 ASSESSMENT — MIFFLIN-ST. JEOR: SCORE: 1532.4

## 2021-12-14 NOTE — NURSING NOTE
"Chief Complaint   Patient presents with     Prenatal Care     28 weeks 0 days       Initial /62 (BP Location: Right arm, Patient Position: Sitting, Cuff Size: Adult Regular)   Pulse 100   Ht 1.626 m (5' 4\")   Wt 80.7 kg (178 lb)   SpO2 99%   BMI 30.55 kg/m   Estimated body mass index is 30.55 kg/m  as calculated from the following:    Height as of this encounter: 1.626 m (5' 4\").    Weight as of this encounter: 80.7 kg (178 lb).  Medication Reconciliation: complete     Isela Cruz LPN    "

## 2021-12-15 LAB
HCV AB SERPL QL IA: NONREACTIVE
T PALLIDUM AB SER QL: NONREACTIVE

## 2021-12-15 NOTE — PROGRESS NOTES
Doing well.  28 week labs and Tdap today.  Baby active.  No VB, leaking, or cramping.  Third trimester changes, comfort measures, and kick counts discussed.  Return in 2 weeks.

## 2021-12-15 NOTE — PATIENT INSTRUCTIONS
Patient Education     Understanding Blood Sugar During Pregnancy  Gestational diabetes causes high blood sugar levels during pregnancy. You are at risk of developing, or perhaps have already developed, gestational diabetes. Controlling your blood sugar can help prevent problems for you and your baby.  Your body turns food into blood sugar  As food is digested, it turns into sugar (glucose), a fuel that feeds your body. This sugar goes into your bloodstream. Your body then releases a substance called insulin to help your body use blood sugar properly.    Blood sugar goes to your baby  The placenta is where nutrients in your blood are exchanged with your baby s blood. Your blood sugar goes to your baby from the placenta through the umbilical cord. Your baby uses this sugar to grow.  Too much blood sugar affects you and your baby  During pregnancy, the placenta makes hormones that can disrupt the way your body uses insulin. If your body can t use insulin properly, your blood sugar level gets too high. Then too much blood sugar goes to your baby. This can cause problems for both you and your baby.  Controlling your blood sugar helps prevent problems  You can lower your blood sugar by eating right, exercising, and taking medicines that your healthcare provider prescribes to control your blood sugar. If you keep your blood sugar in control, the risks to you and your baby are the same as those for a normal pregnancy.  [employer] last reviewed this educational content on 2/1/2018 2000-2021 [employer]. All rights reserved. This information is not intended as a substitute for professional medical care. Always follow your healthcare professional's instructions.           Patient Education     Adapting to Pregnancy: Third Trimester    Although common during pregnancy, some discomforts may seem worse in the final weeks. Simple lifestyle changes can help. Take care of yourself. And ask your partner to help out  with small tasks.  Limiting leg problems  Ways to combat leg issues:    Wear support hose all day.    Avoid snug shoes and clothes that bind, like tight pants and socks with elastic tops.    Sit with your feet and legs raised often.  Caring for your breasts  Tips to follow include:    Wash with plain water. Avoid using harsh soaps or rubbing alcohol. They may cause dryness.    Wear a nursing bra for extra support. It can also hide any leaks from your nipples.  Controlling hemorrhoids  Ways to avoid hemorrhoids include:    Eat foods that are high in fiber. Also, exercise and drink enough fluids. This will reduce constipation and hemorrhoids.    Sleep and nap on your side. This limits pressure on the veins of your rectum.    Try not to stand or sit for long periods.  Controlling back pain  As your body changes during pregnancy, your back must work in new ways. Back pain is due to many causes. Physical changes in your body can strain your back and its supporting muscles. Also, hormones (chemicals that carry messages throughout the body) increase during pregnancy. This can affect how your muscles and joints work together. All of these changes can lead to pain. Pain may be felt in the upper or lower back. Pain is also common in the pelvis. Some pregnant women have sciatica. This is pain caused by pressure on the sciatic nerve running down the back of the leg. Ask your healthcare provider for specific tips and exercises to help control your back pain.  Tips to help you rest  Good rest and sleep will help you feel better. Here are some ideas:    Ask your partner to massage your shoulders, neck, or back.    Limit the errands you do each day.    Lie down in the afternoon or after work for a few minutes.    Take a warm bath before you go to sleep.    Drink warm milk or teas without caffeine.    Avoid coffee, black tea, and cola.  Stopping heartburn    Avoid spicy, greasy, fried, or acidic foods.    Eat small amounts more  often. Eat slowly.   Wait 2 hours after eating before lying down.    Sleep with your upper body raised 6 inches.   Managing mood swings  Ways to manage mood swings include:    Know that mood changes are normal.    Exercise often, but get plenty of rest.    Address any concerns and limit stress. Talking to your partner, other women, or your healthcare provider may help.  Dealing with urinary frequency  Tips to deal with having to urinate often include:    Drink plenty of water all day. If you drink a lot in the evening, though, you may have to get up more in the night.    Limit coffee, black tea, and cola.  [employer] last reviewed this educational content on 2/1/2018 2000-2021 [employer]. All rights reserved. This information is not intended as a substitute for professional medical care. Always follow your healthcare professional's instructions.           Patient Education     Comfort Tips During Pregnancy  Pregnancy can bring discomfort of different kinds. Below are tips for ways to feel better. Talk with your healthcare provider before using pain-relieving medicine at any time during your pregnancy.     First trimester tips  Easing nausea    Get up slowly. Eat a few unsalted crackers before you get out of bed.    Avoid smells that bother you.    Eat small, bland, low-fat, high-protein meals at frequent intervals.    Sip on water, weak tea, or clear soft drinks, like ginger ale. Eat ice chips.    Try taking vitamin B6.  Coping with fatigue    Take catnaps when you can.    Get regular exercise.    Accept help from others.    Practice good sleep habits, like going to bed and getting up at the same time each day. Use your bed only for sleep and sex.  Calming mood swings    Talk about your feelings with others, including other mothers.    Limit sugar, chocolate, and caffeine.    Eat a healthy diet. Don t skip meals.    Get regular exercise.  Soothing headaches    Get fresh air and exercise.    Relax and  get enough rest.    Check with your healthcare provider before taking any pain medicines.    Second trimester tips    To limit ankle swelling, sit with your feet raised or wear support hose.    If you have pain in your groin and stomach (round ligament pain), don't make sudden twisting movements with your body.    For leg cramps, flexing your foot often brings immediate relief. Also try massaging your calf in long, downward strokes, or stretching your legs before going to bed. Get enough exercise and wear shoes with flexible soles. Eat foods rich in calcium.    Third trimester tips  Reducing heartburn    Eat small, light meals throughout the day rather than 3 large ones.    Sleep with your upper body raised 6 inches. Don t lie down until 2 hours after you eat.    Don't eat greasy, fried, or spicy foods.    Don't have citrus fruits or juices.  Treating constipation    Eat foods high in fiber, such as whole-grain foods, and fresh fruit and vegetables).    Drink plenty of water.    Get regular exercise.    Ask about your healthcare provider about medicines that have docusate or psyllium.  Taking care of your breasts    Don't use harsh soaps or alcohol, which can make your skin too dry.    Wear nursing bras. They provide more support than regular bras and can be used after pregnancy if you breastfeed.  Getting a good night s sleep     Take a warm shower before bed.    Sleep on a firm mattress.    Lie on your side with 1 leg crossed over the other.    Use pillows to support your arms, legs, and belly.    [employer] last reviewed this educational content on 8/1/2020 2000-2021 [employer]. All rights reserved. This information is not intended as a substitute for professional medical care. Always follow your healthcare professional's instructions.           Patient Education     Pregnancy: Your Third Trimester Changes  As the baby grows, your body changes too. You may also see signs that your body is getting  ready for labor. Be patient. Within a few more weeks, your baby will be born.  How you are changing  Your body is preparing for the birth of your baby. Some of the most common changes are listed below. If you have any questions or concerns, ask your healthcare provider:    You ll gain more weight from fluids, extra blood, and fat deposits.    Your breasts will grow as your body gets ready to feed the baby. They may be more tender. You may also notice a slight yellow or white discharge from the nipples.    Discharge from your vagina may increase. This is normal.    You might see some skin color changes on your forehead, cheeks, or nose. Most of these will go away after you deliver.  How your baby is growing       Month 7  Your baby can open and close his or her eyes and weighs around 4 pounds (1.8 kg). If born prematurely (too early), your baby would likely survive with special care. Month 8  Your baby is building up body fat and weighs around 6 pounds (2.7 kg). Month 9  Your baby weighs nearly 7 pounds (3.2 kg) and is about 19 to 21 inches long. In other words, any day now...   [employer] last reviewed this educational content on 2/1/2018 2000-2021 [employer]. All rights reserved. This information is not intended as a substitute for professional medical care. Always follow your healthcare professional's instructions.           Patient Education     Kick Counts  It s normal to worry about your baby s health. One way you can know your baby s doing well is to record the baby s movements once a day. This is called a kick count.   Remember to take your kick count records to all your appointments with your healthcare provider.  How to count kicks    Time how long it takes you to feel 10 kicks, flutters, swishes, or rolls. Ideally, you want to feel at least 10 movements in 2 hours. You will likely feel 10 movements in less time than that.  Starting at 28 weeks, count your baby's movements daily. Follow your  healthcare provider's instructions for kick counting. Here are tips for counting kicks:    Choose a time when the baby is active, such as after a meal.     Sit comfortably or lie on your side.     The first time the baby moves, write down the time.     Count each movement until the baby has moved  10 times. This can take from 20 minutes to 2 hours.     If you haven't felt 10 kicks by the end of the second hour, wait a few hours. Then try again.    Try to do it at the same time each day.  When to call your healthcare provider  Call your healthcare provider  right away if:    You do a couple sets of kick counts during the day and your baby moves fewer than 10 times in 2 hours.    Your baby moves much less often than on the days before.    You haven't felt your baby move all day.  [employer] last reviewed this educational content on 8/1/2020 2000-2021 [employer]. All rights reserved. This information is not intended as a substitute for professional medical care. Always follow your healthcare professional's instructions.

## 2021-12-16 ASSESSMENT — PATIENT HEALTH QUESTIONNAIRE - PHQ9: SUM OF ALL RESPONSES TO PHQ QUESTIONS 1-9: 1

## 2021-12-28 ENCOUNTER — PRENATAL OFFICE VISIT (OUTPATIENT)
Dept: OBGYN | Facility: OTHER | Age: 26
End: 2021-12-28
Attending: NURSE PRACTITIONER
Payer: COMMERCIAL

## 2021-12-28 VITALS
BODY MASS INDEX: 30.56 KG/M2 | DIASTOLIC BLOOD PRESSURE: 60 MMHG | SYSTOLIC BLOOD PRESSURE: 102 MMHG | HEIGHT: 64 IN | WEIGHT: 179 LBS

## 2021-12-28 DIAGNOSIS — Z34.03 ENCOUNTER FOR SUPERVISION OF NORMAL FIRST PREGNANCY, THIRD TRIMESTER: Primary | ICD-10-CM

## 2021-12-28 PROCEDURE — 99207 PR PRENATAL VISIT: CPT | Performed by: NURSE PRACTITIONER

## 2021-12-28 RX ORDER — PNV NO.95/FERROUS FUM/FOLIC AC 28MG-0.8MG
1 TABLET ORAL DAILY
COMMUNITY
End: 2022-11-08

## 2021-12-28 ASSESSMENT — PAIN SCALES - GENERAL: PAINLEVEL: NO PAIN (0)

## 2021-12-28 ASSESSMENT — MIFFLIN-ST. JEOR: SCORE: 1536.94

## 2021-12-29 NOTE — PATIENT INSTRUCTIONS
"BREASTFEEDING TIPS  1. Breastfeed every 2-4 hours. If your baby is sleepy - use breast compression, push on chin to \"start up\" baby, switch breasts, undress to diaper and wake before relatching.   Some babies \"cluster\" feed every 1 hour for a while- this is normal. Feed your baby whenever he/she is awake-  even if every hour for a while. This frequent feeding will help you make more milk and encourage your baby to sleep for longer stretches later in the evening or night.    - Position your baby close to you with pillows so he/she is facing you -belly to belly laying horizontally across your lap at the level of your breast and looking a bit \"upwards\" to your breast   -One hand holds the baby's neck behind the ears and the other hand holds your breast  -Baby's nose should start out pointing to your nipple before latching  - Hold your breast in a \"sandwich\" position by gently squeezing your breast in an oval shape and make sure your hands are not covering the areola  This \"nipple sandwich\" will make it easier for your breast to fit inside the baby's mouth-making latching more comfortable for you and baby and preventing sore nipples. Your baby should take a \"mouthful\" of breast!  - You may want to use hand expression to \"prime the pump\" and get a drip of milk out on your nipple to wake baby   (see website: [email or web address].edu/Breastfeeding/HandExpression.html)  - Swipe your nipple on baby's upper lip and wait for a BIG open mouth  - YOU bring baby to the breast (hold baby's neck with your fingers just below the ears) and bring baby's head to the breast--leading with the chin.  Try to avoid pushing your breast into baby's mouth- bring baby to you instead!  - Aim to get your baby's bottom lip LOW DOWN ON AREOLA (baby's upper lip just needs to \"clear\" the nipple) .   Your baby should latch onto the areola and NOT just the nipple. That way your baby gets more milk and you don't get sore nipples!      Useful web " sites:  Www.infantrisk.com  Www.aap.org  Www.ibreastfeeding.com  [email or web address]

## 2022-01-09 ENCOUNTER — HEALTH MAINTENANCE LETTER (OUTPATIENT)
Age: 27
End: 2022-01-09

## 2022-01-11 ENCOUNTER — PRENATAL OFFICE VISIT (OUTPATIENT)
Dept: OBGYN | Facility: OTHER | Age: 27
End: 2022-01-11
Attending: NURSE PRACTITIONER
Payer: COMMERCIAL

## 2022-01-11 VITALS
SYSTOLIC BLOOD PRESSURE: 101 MMHG | DIASTOLIC BLOOD PRESSURE: 60 MMHG | WEIGHT: 182.2 LBS | HEART RATE: 96 BPM | BODY MASS INDEX: 31.1 KG/M2 | OXYGEN SATURATION: 100 % | HEIGHT: 64 IN

## 2022-01-11 DIAGNOSIS — Z34.03 ENCOUNTER FOR SUPERVISION OF NORMAL FIRST PREGNANCY, THIRD TRIMESTER: Primary | ICD-10-CM

## 2022-01-11 PROCEDURE — 99207 PR PRENATAL VISIT: CPT | Performed by: NURSE PRACTITIONER

## 2022-01-11 PROCEDURE — G0463 HOSPITAL OUTPT CLINIC VISIT: HCPCS | Performed by: COUNSELOR

## 2022-01-11 ASSESSMENT — MIFFLIN-ST. JEOR: SCORE: 1551.45

## 2022-01-11 ASSESSMENT — PAIN SCALES - GENERAL: PAINLEVEL: NO PAIN (0)

## 2022-01-11 NOTE — NURSING NOTE
"Chief Complaint   Patient presents with     Prenatal Care     32 weeks 0 days       Initial /60 (BP Location: Left arm, Patient Position: Sitting, Cuff Size: Adult Regular)   Pulse 96   Ht 1.626 m (5' 4\")   Wt 82.6 kg (182 lb 3.2 oz)   SpO2 100%   BMI 31.27 kg/m   Estimated body mass index is 31.27 kg/m  as calculated from the following:    Height as of this encounter: 1.626 m (5' 4\").    Weight as of this encounter: 82.6 kg (182 lb 3.2 oz).  Medication Reconciliation: complete     Isela Cruz, LILLY    "

## 2022-01-12 NOTE — PROGRESS NOTES
Doing well.  Late pregnancy discomforts and managment discussed. No cramping or VB.  Baby active.  Discussed early breastfeeding support.  covid prevention reviewed.  Return in 2 weeks.

## 2022-01-12 NOTE — PATIENT INSTRUCTIONS
"BREASTFEEDING TIPS  1. Breastfeed every 2-4 hours. If your baby is sleepy - use breast compression, push on chin to \"start up\" baby, switch breasts, undress to diaper and wake before relatching.   Some babies \"cluster\" feed every 1 hour for a while- this is normal. Feed your baby whenever he/she is awake-  even if every hour for a while. This frequent feeding will help you make more milk and encourage your baby to sleep for longer stretches later in the evening or night.    - Position your baby close to you with pillows so he/she is facing you -belly to belly laying horizontally across your lap at the level of your breast and looking a bit \"upwards\" to your breast   -One hand holds the baby's neck behind the ears and the other hand holds your breast  -Baby's nose should start out pointing to your nipple before latching  - Hold your breast in a \"sandwich\" position by gently squeezing your breast in an oval shape and make sure your hands are not covering the areola  This \"nipple sandwich\" will make it easier for your breast to fit inside the baby's mouth-making latching more comfortable for you and baby and preventing sore nipples. Your baby should take a \"mouthful\" of breast!  - You may want to use hand expression to \"prime the pump\" and get a drip of milk out on your nipple to wake baby   (see website: [email or web address].[email or web address]/Breastfeeding/HandExpression.html)  - Swipe your nipple on baby's upper lip and wait for a BIG open mouth  - YOU bring baby to the breast (hold baby's neck with your fingers just below the ears) and bring baby's head to the breast--leading with the chin.  Try to avoid pushing your breast into baby's mouth- bring baby to you instead!  - Aim to get your baby's bottom lip LOW DOWN ON AREOLA (baby's upper lip just needs to \"clear\" the nipple) .   Your baby should latch onto the areola and NOT just the nipple. That way your baby gets more milk and you don't get sore nipples!      Useful web " sites:  Www.infantrisk.com  Www.aap.org  Www.ibreastfeeding.com  [email or web address].mn.[email or web address]

## 2022-01-13 ENCOUNTER — OFFICE VISIT (OUTPATIENT)
Dept: FAMILY MEDICINE | Facility: OTHER | Age: 27
End: 2022-01-13
Attending: FAMILY MEDICINE
Payer: COMMERCIAL

## 2022-01-13 DIAGNOSIS — Z20.822 SUSPECTED 2019 NOVEL CORONAVIRUS INFECTION: ICD-10-CM

## 2022-01-13 DIAGNOSIS — J02.0 STREPTOCOCCAL SORE THROAT: ICD-10-CM

## 2022-01-13 DIAGNOSIS — U07.1 COVID-19 VIRUS RNA TEST RESULT POSITIVE AT LIMIT OF DETECTION: Primary | ICD-10-CM

## 2022-01-13 DIAGNOSIS — Z20.822 SUSPECTED 2019 NOVEL CORONAVIRUS INFECTION: Primary | ICD-10-CM

## 2022-01-13 LAB
FLUAV RNA SPEC QL NAA+PROBE: NEGATIVE
FLUBV RNA RESP QL NAA+PROBE: NEGATIVE
GROUP A STREP BY PCR: NOT DETECTED
RSV RNA SPEC NAA+PROBE: NEGATIVE
SARS-COV-2 RNA RESP QL NAA+PROBE: POSITIVE

## 2022-01-13 PROCEDURE — 87637 SARSCOV2&INF A&B&RSV AMP PRB: CPT | Mod: ZL

## 2022-01-13 PROCEDURE — 87637 SARSCOV2&INF A&B&RSV AMP PRB: CPT

## 2022-01-13 PROCEDURE — 87651 STREP A DNA AMP PROBE: CPT

## 2022-01-13 PROCEDURE — 87651 STREP A DNA AMP PROBE: CPT | Mod: ZL

## 2022-01-14 ENCOUNTER — MYC MEDICAL ADVICE (OUTPATIENT)
Dept: OBGYN | Facility: OTHER | Age: 27
End: 2022-01-14
Payer: COMMERCIAL

## 2022-01-25 ENCOUNTER — PRENATAL OFFICE VISIT (OUTPATIENT)
Dept: OBGYN | Facility: OTHER | Age: 27
End: 2022-01-25
Attending: NURSE PRACTITIONER
Payer: COMMERCIAL

## 2022-01-25 VITALS
HEIGHT: 64 IN | HEART RATE: 76 BPM | DIASTOLIC BLOOD PRESSURE: 71 MMHG | SYSTOLIC BLOOD PRESSURE: 118 MMHG | BODY MASS INDEX: 31.29 KG/M2 | WEIGHT: 183.3 LBS | OXYGEN SATURATION: 98 %

## 2022-01-25 DIAGNOSIS — U07.1 COVID-19 AFFECTING PREGNANCY IN THIRD TRIMESTER: Primary | ICD-10-CM

## 2022-01-25 DIAGNOSIS — Z34.03 ENCOUNTER FOR SUPERVISION OF NORMAL FIRST PREGNANCY, THIRD TRIMESTER: ICD-10-CM

## 2022-01-25 DIAGNOSIS — O98.513 COVID-19 AFFECTING PREGNANCY IN THIRD TRIMESTER: Primary | ICD-10-CM

## 2022-01-25 PROCEDURE — 99207 PR PRENATAL VISIT: CPT | Performed by: NURSE PRACTITIONER

## 2022-01-25 PROCEDURE — G0463 HOSPITAL OUTPT CLINIC VISIT: HCPCS | Performed by: COUNSELOR

## 2022-01-25 ASSESSMENT — PAIN SCALES - GENERAL: PAINLEVEL: NO PAIN (0)

## 2022-01-25 ASSESSMENT — MIFFLIN-ST. JEOR: SCORE: 1556.44

## 2022-01-25 NOTE — NURSING NOTE
"Chief Complaint   Patient presents with     Prenatal Care     34 weeks 0 days       Initial /71 (BP Location: Left arm, Patient Position: Sitting, Cuff Size: Adult Regular)   Pulse 76   Ht 1.626 m (5' 4\")   Wt 83.1 kg (183 lb 4.8 oz)   SpO2 98%   BMI 31.46 kg/m   Estimated body mass index is 31.46 kg/m  as calculated from the following:    Height as of this encounter: 1.626 m (5' 4\").    Weight as of this encounter: 83.1 kg (183 lb 4.8 oz).  Medication Reconciliation: complete     Isela Cruz, LILLY    "

## 2022-01-26 PROBLEM — Z34.01 NORMAL FIRST PREGNANCY IN FIRST TRIMESTER: Status: ACTIVE | Noted: 2021-08-20

## 2022-01-26 NOTE — PROGRESS NOTES
Baby active.  Denies concerns.  No cramping or VB.  Second covid infection 1/13/22, mild sx.  growth US ordered for 36 weeks. Signs of PTL and late pregnancy warning signs reviewed. Plan GBS next visit.  Return in 2 weeks.

## 2022-01-26 NOTE — PATIENT INSTRUCTIONS
Patient Education     Vaginal Birth: Your Experience     Once the head and shoulders appear, your baby is ready to be born.   You re almost ready for the big event -- your baby s birth. Once your cervix becomes fully dilated, you can begin pushing. At this point you may have a burst of energy. The delivery itself may take a few contractions or a few hours. If your baby needs help getting out of the birth canal, your healthcare provider can assist.  Getting ready to push  The shortest but most intense part of labor is transition. This is when the cervix becomes fully dilated. Contractions may become even stronger. They may last 60 to 90 seconds, with almost no rest in between. This is a demanding time. You might experience hot flashes, chills, nausea, vomiting, or gas. IV (intravenous) pain medicines are also rarely given so close to your baby s birth. However, epidurals are continued during birth. Help yourself by working with your support person or labor . You may feel an urge to push or bear down. But do not push until your healthcare provider or midwife tells you to.  Pushing toward birth  After your baby s head enters the birth canal, contractions may come less often. Pushing down with the contraction helps move your baby further into the birth canal. If you ve had a  in the past, your labor will be managed to help prevent tearing the scar.  Your baby s birth  Once your baby s head passes under the pubic symphysis, your perineum starts to stretch and bulge. Soon, the top of your baby s head crowns (appears at the vaginal opening). You may have a burning feeling as this happens. Your healthcare provider or midwife may tell you to pant. This is so you won t push too hard and tear your perineum as the baby s head and shoulders come through. (A small amount of tearing is not rare and is not a problem.) Your baby is born soon after the shoulders leave the birth canal. The umbilical cord is then  cut.   Assisted delivery  Your baby may need extra help getting out of the birth canal. If so:    An episiotomy (a small incision in the perineum) may be made. This enlarges the vaginal opening and helps prevent tearing. A local anesthetic may be used to numb the area. After your baby is born, the incision is stitched closed with sutures. These are usually dissolvable.    Forceps (spoon-shaped instruments that cup the baby s head) may be used to help your baby s head through the birth canal.    Vacuum extraction, which uses a small suction cup attached to the baby s head, may be used to assist the birth.  After your baby s birth  After the baby is born, the placenta is delivered. Mild contractions separate it from the uterus and move it into the vagina. Then you push it out. Your healthcare provider or midwife may press on the uterus to expel blood clots. If you had an epidural anesthetic, the catheter is removed.  Support person s note    Help the mother into a pushing position. Support her body as she pushes. A semi-sitting or semi-squatting position allows gravity to assist the birth.    Remind her to rest between contractions.    Encourage her by telling her when the baby s head appears.    Keep in mind that you may be masked and gowned for the birth, depending on hospital policy.    [email or web address] last reviewed this educational content on 1/1/2018 2000-2021 [employer]. All rights reserved. This information is not intended as a substitute for professional medical care. Always follow your healthcare professional's instructions.           Patient Education     Labor and Childbirth: Your Body Prepares  Labor is the series of uterine contractions that dilate (open) and efface (thin) your cervix for birth. Your due date is a guide to when labor will begin, but babies often come days or weeks before or after due dates. Even so, labor need not take you by surprise. In the last weeks of pregnancy, you or  your healthcare provider may notice changes that mean labor is near.  Changes in your body  Physical changes often signal that your baby will soon be born:    Discharge from your vagina may increase and become thicker. You may notice a pink or brownish discharge called the bloody show.    The mucous plug may break down over a few weeks or all at once. Losing the plug doesn t mean that labor will start right away.    You may feel Dav Painter contractions (false labor). These irregular contractions start to soften and thin the cervix. Many women mistake these contractions for true labor. They may be more noticeable towards the end of the day.    Feeling like the baby has dropped lower. In preparation for birth, the baby's head has settled deep into your pelvis.  [employer] last reviewed this educational content on 2018-2021 [employer]. All rights reserved. This information is not intended as a substitute for professional medical care. Always follow your healthcare professional's instructions.           Patient Education     Labor and Childbirth: Immediately after Birth  After any type of birth, your healthcare provider will closely monitor your recovery. You ll likely be able to greet your baby and start your new life together. While you re being cared for, your baby receives his or her first exam.    Starting your life together  Attachment, or bonding, starts soon after birth. It s an ongoing process that may take weeks or months. Be aware that you may not fall in love with your baby right away. Most newborns don t look like the chubby babies you see on TV. Months spent in your uterus and time in the birth canal can make your  look wrinkled and puffy-eyed. A slightly pointed or misshapen head is also common. These all go away after a few days. After birth, you may place your baby on your stomach or breast. This will signal your body to begin making milk. If you choose not to  breastfeed, your healthcare provider will instruct you on how to stop milk production. If you are going to breastfeed, your healthcare provider or nurse may help you introduce your baby to your breast and start breastfeeding. [place] babies are usually very alert right after birth. They are ready to start breastfeeding. Whether or not you are going to breastfeed your baby, your baby will likely be placed skin-to-skin on your chest. This allows your body to help regulate your baby's temperature. It can also start the bonding process.  Your immediate recovery  After birth, most women shake and get chills. This is over quickly. Your healthcare provider will watch your temperature and blood pressure until they are stable. Sanitary pads absorb the discharge of your uterine lining. To ensure that you aren t bleeding too much, the pad and the firmness of your uterus will be checked. If you had anesthesia, your healthcare provider will watch you closely until you can feel and move your toes. If you have pain, he or she may give you pain relievers. If you have perineal pain, an ice pack can help.  Baby s first exam  A healthcare provider will examine your baby within the first 5 minutes after birth, or after you have had the opportunity to breastfeed your baby. Your baby s heart rate, respiration (breathing), muscle tone, reflexes, and color are assessed. Based on the exam, an APGAR (activity, pulse, grimace, appearance, respiration) score is given. Your baby may also be bathed, dried, weighed, and measured. Eye drops may be given to prevent infection. ID bracelets are placed around the baby s wrist and ankle.  [employer] last reviewed this educational content on 2018-2021 [employer]. All rights reserved. This information is not intended as a substitute for professional medical care. Always follow your healthcare professional's instructions.           Patient Education     Labor and Childbirth:  Active Labor  During active labor, your contractions will be stronger and more rhythmic than with early labor. They peak and subside like waves. They may happen 2 to 3 minutes apart and last about 45 to 60 seconds. This part of labor can be hard work. But it is often shorter than early labor. When you reach active labor, exams and tests will be done to see how you and your baby are doing.     Your cervix may dilate 4 to 8 centimeters during the first part of active labor.   Evaluating you and your baby  An exam tells how you and your baby are responding to contractions. Your blood pressure, temperature, and pulse will be checked. A blood or urine sample may also be taken. A fetal monitor will be used to check your baby s heart rate. Sometimes an IV (intravenous) line is started to give you medicine and fluids.  Moving ahead with labor  You may now feel contractions in your whole stomach instead of just the lower part (like during early labor). If your amniotic sac has not broken already, it may break now. Or, it may be broken for you. To help your baby descend, change position often. Walking or sitting in a rocking chair or recliner may help. You may find it hard to relax even though you are tired. You may also be less interested in talking than you were earlier. If you re having anesthesia, you will get it now.   Special issues during labor  If labor doesn t progress well or a problem arises, you may need a . But your healthcare providers may take certain steps to help you avoid a :    If your cervix isn t dilating, a medicine (oxytocin) may be used to augment labor.    If fetal monitoring shows your baby isn t getting enough oxygen, shifting your body position may help. You may also be given oxygen through a mask.    If you have preeclampsia (a condition that results in high blood pressure, swelling, and other symptoms), you may be given medicines by IV (intravenous). Your healthcare provider may  "also tell you to lie on your left side.  Responding to contractions  During contractions, try to stay relaxed. Tense muscles use more oxygen, eat up your body s energy, and increase pain. Use the breathing and relaxation techniques you may have learned. And let your support person know how he or she can help. If you ve had problems during a previous birth, focus on the present. Keep in mind that no 2 births are the same.  Support person s note  Here's how you can help:    Have the mother walk or change positions at least once an hour. This improves circulation and helps the baby descend.    Keep reminding the mother to breathe and relax through each contraction.    Reassure her. Try to keep her from getting anxious or overstressed.    Take care of yourself. Take a short break to eat or go to the bathroom when you need to.    Rest when the mother does. You ll both benefit.  [employer] last reviewed this educational content on 2/1/2018 2000-2021 [employer]. All rights reserved. This information is not intended as a substitute for professional medical care. Always follow your healthcare professional's instructions.           Patient Education     Recognizing Labor  The beginning of labor is the beginning of birth. You ll start to feel strong contractions. That s when the muscles of your uterus tighten up to help push your baby out during birth.     Yes, labor has likely started   Signs of labor include:    Your contractions are getting stronger and more painful instead of weaker. You ll likely feel them throughout your whole uterus.    Your contractions are regular. This means that you feel them about every 5 to 10 minutes. And they are getting closer together.    You have pink-colored or blood-streaked fluid from your vagina.    You feel that the baby has \"dropped\" lower in your pelvis     Your water breaks. It may be a gush or a slow trickle of clear fluid from your vagina.  No, it s likely not real " labor   Signs of false labor include:    Your contractions aren t regular or strong.    You feel the contractions only in your lower uterus.    Your contractions go away when you walk or change position.    Your contractions go away after drinking fluids.  When to call your healthcare provider   Call your healthcare provider or clinic right away if you notice any of these signs:     Fluid from your vagina, with or without contractions.    Bleeding heavy enough that you need a sanitary pad.    You don t feel your baby moving as much as before.  Note: Contractions are timed by both of these measures:     The length of each contraction from its start to its finish.    How far apart the contractions are --the time between the start of one contraction and the start of the next contraction.  [employer] last reviewed this educational content on 8/1/2020 2000-2021 [employer]. All rights reserved. This information is not intended as a substitute for professional medical care. Always follow your healthcare professional's instructions.

## 2022-01-31 ENCOUNTER — TRANSFERRED RECORDS (OUTPATIENT)
Dept: HEALTH INFORMATION MANAGEMENT | Facility: CLINIC | Age: 27
End: 2022-01-31

## 2022-02-08 ENCOUNTER — PRENATAL OFFICE VISIT (OUTPATIENT)
Dept: OBGYN | Facility: OTHER | Age: 27
End: 2022-02-08
Attending: NURSE PRACTITIONER
Payer: COMMERCIAL

## 2022-02-08 VITALS — WEIGHT: 186 LBS | BODY MASS INDEX: 31.93 KG/M2 | DIASTOLIC BLOOD PRESSURE: 82 MMHG | SYSTOLIC BLOOD PRESSURE: 110 MMHG

## 2022-02-08 DIAGNOSIS — Z34.03 NORMAL FIRST PREGNANCY CONFIRMED, THIRD TRIMESTER: Primary | ICD-10-CM

## 2022-02-08 PROCEDURE — G0463 HOSPITAL OUTPT CLINIC VISIT: HCPCS | Performed by: NURSE PRACTITIONER

## 2022-02-08 PROCEDURE — 99207 PR PRENATAL VISIT: CPT | Performed by: NURSE PRACTITIONER

## 2022-02-08 ASSESSMENT — PAIN SCALES - GENERAL: PAINLEVEL: NO PAIN (0)

## 2022-02-09 LAB
GP B STREP DNA SPEC QL NAA+PROBE: NEGATIVE
PATIENT PENICILLIN, AMOXICILLIN, CEPHALOSPORINS ALLERGY: NO

## 2022-02-09 NOTE — PROGRESS NOTES
Doing well. No cramping, BH, or VB.  Baby active.  GBS completed.  Large cherry hemangioma has developed on her upper chest - plan derm referral postpartum. Reviewed signs of labor and late pregnancy warning signs.  Return in 1 week.

## 2022-02-09 NOTE — PATIENT INSTRUCTIONS
Patient Education     Labor and Childbirth: Your Body Prepares  Labor is the series of uterine contractions that dilate (open) and efface (thin) your cervix for birth. Your due date is a guide to when labor will begin, but babies often come days or weeks before or after due dates. Even so, labor need not take you by surprise. In the last weeks of pregnancy, you or your healthcare provider may notice changes that mean labor is near.  Changes in your body  Physical changes often signal that your baby will soon be born:    Discharge from your vagina may increase and become thicker. You may notice a pink or brownish discharge called the bloody show.    The mucous plug may break down over a few weeks or all at once. Losing the plug doesn t mean that labor will start right away.    You may feel [place] [employer] contractions (false labor). These irregular contractions start to soften and thin the cervix. Many women mistake these contractions for true labor. They may be more noticeable towards the end of the day.    Feeling like the baby has dropped lower. In preparation for birth, the baby's head has settled deep into your pelvis.  [employer] last reviewed this educational content on 2/1/2018 2000-2021 [employer]. All rights reserved. This information is not intended as a substitute for professional medical care. Always follow your healthcare professional's instructions.           Patient Education     Labor and Childbirth: Active Labor  During active labor, your contractions will be stronger and more rhythmic than with early labor. They peak and subside like waves. They may happen 2 to 3 minutes apart and last about 45 to 60 seconds. This part of labor can be hard work. But it is often shorter than early labor. When you reach active labor, exams and tests will be done to see how you and your baby are doing.     Your cervix may dilate 4 to 8 centimeters during the first part of active labor.   Evaluating you  and your baby  An exam tells how you and your baby are responding to contractions. Your blood pressure, temperature, and pulse will be checked. A blood or urine sample may also be taken. A fetal monitor will be used to check your baby s heart rate. Sometimes an IV (intravenous) line is started to give you medicine and fluids.  Moving ahead with labor  You may now feel contractions in your whole stomach instead of just the lower part (like during early labor). If your amniotic sac has not broken already, it may break now. Or, it may be broken for you. To help your baby descend, change position often. Walking or sitting in a rocking chair or recliner may help. You may find it hard to relax even though you are tired. You may also be less interested in talking than you were earlier. If you re having anesthesia, you will get it now.   Special issues during labor  If labor doesn t progress well or a problem arises, you may need a . But your healthcare providers may take certain steps to help you avoid a :    If your cervix isn t dilating, a medicine (oxytocin) may be used to augment labor.    If fetal monitoring shows your baby isn t getting enough oxygen, shifting your body position may help. You may also be given oxygen through a mask.    If you have preeclampsia (a condition that results in high blood pressure, swelling, and other symptoms), you may be given medicines by IV (intravenous). Your healthcare provider may also tell you to lie on your left side.  Responding to contractions  During contractions, try to stay relaxed. Tense muscles use more oxygen, eat up your body s energy, and increase pain. Use the breathing and relaxation techniques you may have learned. And let your support person know how he or she can help. If you ve had problems during a previous birth, focus on the present. Keep in mind that no 2 births are the same.  Support person s note  Here's how you can help:    Have the mother  walk or change positions at least once an hour. This improves circulation and helps the baby descend.    Keep reminding the mother to breathe and relax through each contraction.    Reassure her. Try to keep her from getting anxious or overstressed.    Take care of yourself. Take a short break to eat or go to the bathroom when you need to.    Rest when the mother does. You ll both benefit.  [email or web address] last reviewed this educational content on 2/1/2018 2000-2021 [employer]. All rights reserved. This information is not intended as a substitute for professional medical care. Always follow your healthcare professional's instructions.           Patient Education     Labor and Childbirth: Support Person's Notes  You may be excited and anxious about the impending labor and childbirth. You may also wonder how you can help. Learning about the birth process can help you know what to expect. And following the suggestions below can help ease you and the mother through this exciting time.  During early labor    Be sure to time the contractions.    Keep the setting soothing. Dim lights and prevent loud noises. Try playing relaxing music.    Suggest that the mother soak in a warm tub to ease the pain of contractions.    Try to distract the mother from the contractions with a short walk or massage.    Encourage the mother to rest if she's tired.    As contractions become stronger, help her use labor breathing techniques.    During active labor    Have the mother walk or change position at least once an hour. This improves circulation and helps the baby descend.    Keep reminding the mother to breathe and relax through each contraction.    Reassure her. Try to keep her from getting anxious or overstressed.    Take care of yourself. Take a short break to eat or go to the bathroom when you need to.    Rest when the mother does. You'll both benefit.    During a vaginal birth    Help the mother into a pushing position.  Support her body as she pushes. A semi-sitting or semi-squatting position allows gravity to assist the birth.    Remind her to rest between contractions. Encourage her by telling her when the baby's head appears.    Keep in mind that you may be masked and gowned for the birth, depending on hospital policy.    During a  birth    You will most likely be able to stay with the mother during the . If you remain with her, you'll wear a mask and surgical gown.    Remember that  birth is surgery. The mother's abdominal area will be draped and out of view. Don't touch the draped areas, which are sterile.    If you aren't allowed to attend the delivery or aren't comfortable doing so, wait with other friends and family members in the family waiting area.    [email or web address] last reviewed this educational content on 2018-2021 [employer]. All rights reserved. This information is not intended as a substitute for professional medical care. Always follow your healthcare professional's instructions.           Patient Education     Labor and Childbirth: Immediately after Birth  After any type of birth, your healthcare provider will closely monitor your recovery. You ll likely be able to greet your baby and start your new life together. While you re being cared for, your baby receives his or her first exam.    Starting your life together  Attachment, or bonding, starts soon after birth. It s an ongoing process that may take weeks or months. Be aware that you may not fall in love with your baby right away. Most newborns don t look like the chubby babies you see on TV. Months spent in your uterus and time in the birth canal can make your  look wrinkled and puffy-eyed. A slightly pointed or misshapen head is also common. These all go away after a few days. After birth, you may place your baby on your stomach or breast. This will signal your body to begin making milk. If you choose not to  breastfeed, your healthcare provider will instruct you on how to stop milk production. If you are going to breastfeed, your healthcare provider or nurse may help you introduce your baby to your breast and start breastfeeding. [place] babies are usually very alert right after birth. They are ready to start breastfeeding. Whether or not you are going to breastfeed your baby, your baby will likely be placed skin-to-skin on your chest. This allows your body to help regulate your baby's temperature. It can also start the bonding process.  Your immediate recovery  After birth, most women shake and get chills. This is over quickly. Your healthcare provider will watch your temperature and blood pressure until they are stable. Sanitary pads absorb the discharge of your uterine lining. To ensure that you aren t bleeding too much, the pad and the firmness of your uterus will be checked. If you had anesthesia, your healthcare provider will watch you closely until you can feel and move your toes. If you have pain, he or she may give you pain relievers. If you have perineal pain, an ice pack can help.  Baby s first exam  A healthcare provider will examine your baby within the first 5 minutes after birth, or after you have had the opportunity to breastfeed your baby. Your baby s heart rate, respiration (breathing), muscle tone, reflexes, and color are assessed. Based on the exam, an APGAR (activity, pulse, grimace, appearance, respiration) score is given. Your baby may also be bathed, dried, weighed, and measured. Eye drops may be given to prevent infection. ID bracelets are placed around the baby s wrist and ankle.  [email or web address] last reviewed this educational content on 2018-2021 [employer]. All rights reserved. This information is not intended as a substitute for professional medical care. Always follow your healthcare professional's instructions.           Patient Education     Labor and Childbirth:  Preparing to Go Home  You may be anxious to go home as soon as possible. Before you and your baby go home, a healthcare provider will make sure that your baby has no health problems. You will also be checked to be sure you are healthy enough to take care of your baby and yourself.  Checking baby s health  A pediatrician or other healthcare provider will give your  a complete examination. All babies are checked to rule out problems, like a dislocated hip or a heart murmur. A few drops of blood will be taken from your baby's heel to check for certain diseases. A hepatitis B vaccine may be given. An antibiotic ointment may be put in your baby's eyes. The pediatrician will discuss the exam results with you and answer your questions. You may also schedule your baby's first office visit.  When you re ready  You re ready to go home when:    You can walk to and use the bathroom without help.    You have a normal amount of bleeding.    You can eat solid food and swallow pain pills.    You have adequate pain relief.    You have no sign of infection or other health problem, including fever.  If you are concerned that you are not ready to be discharged, be sure to discuss these concerns with your healthcare provider.  Taking baby home  Most often you and your baby go home together. Your baby is ready to go home when:    He or she has no sign of a health problem.    He or she has had a hearing screen.    He or she has had routine laboratory testing.    He or she is eating well.    Your infant's temperature is normal.    A government-approved car seat is properly installed in the car your baby will ride home in.  [employer] last reviewed this educational content on 2018-2021 [employer]. All rights reserved. This information is not intended as a substitute for professional medical care. Always follow your healthcare professional's instructions.           Patient Education     Recognizing Labor  The  "beginning of labor is the beginning of birth. You ll start to feel strong contractions. That s when the muscles of your uterus tighten up to help push your baby out during birth.     Yes, labor has likely started   Signs of labor include:    Your contractions are getting stronger and more painful instead of weaker. You ll likely feel them throughout your whole uterus.    Your contractions are regular. This means that you feel them about every 5 to 10 minutes. And they are getting closer together.    You have pink-colored or blood-streaked fluid from your vagina.    You feel that the baby has \"dropped\" lower in your pelvis     Your water breaks. It may be a gush or a slow trickle of clear fluid from your vagina.  No, it s likely not real labor   Signs of false labor include:    Your contractions aren t regular or strong.    You feel the contractions only in your lower uterus.    Your contractions go away when you walk or change position.    Your contractions go away after drinking fluids.  When to call your healthcare provider   Call your healthcare provider or clinic right away if you notice any of these signs:     Fluid from your vagina, with or without contractions.    Bleeding heavy enough that you need a sanitary pad.    You don t feel your baby moving as much as before.  Note: Contractions are timed by both of these measures:     The length of each contraction from its start to its finish.    How far apart the contractions are --the time between the start of one contraction and the start of the next contraction.  [employer] last reviewed this educational content on 8/1/2020 2000-2021 [employer]. All rights reserved. This information is not intended as a substitute for professional medical care. Always follow your healthcare professional's instructions.           "

## 2022-02-15 ENCOUNTER — PRENATAL OFFICE VISIT (OUTPATIENT)
Dept: OBGYN | Facility: OTHER | Age: 27
End: 2022-02-15
Attending: NURSE PRACTITIONER
Payer: COMMERCIAL

## 2022-02-15 VITALS
HEIGHT: 64 IN | DIASTOLIC BLOOD PRESSURE: 64 MMHG | SYSTOLIC BLOOD PRESSURE: 120 MMHG | BODY MASS INDEX: 32.1 KG/M2 | WEIGHT: 188 LBS

## 2022-02-15 DIAGNOSIS — Z34.03 NORMAL FIRST PREGNANCY CONFIRMED, THIRD TRIMESTER: Primary | ICD-10-CM

## 2022-02-15 PROCEDURE — G0463 HOSPITAL OUTPT CLINIC VISIT: HCPCS

## 2022-02-15 PROCEDURE — 99207 PR PRENATAL VISIT: CPT | Performed by: NURSE PRACTITIONER

## 2022-02-15 ASSESSMENT — PAIN SCALES - GENERAL: PAINLEVEL: NO PAIN (0)

## 2022-02-15 ASSESSMENT — MIFFLIN-ST. JEOR: SCORE: 1577.76

## 2022-02-15 NOTE — PATIENT INSTRUCTIONS
"BREASTFEEDING TIPS  1. Breastfeed every 2-4 hours. If your baby is sleepy - use breast compression, push on chin to \"start up\" baby, switch breasts, undress to diaper and wake before relatching.   Some babies \"cluster\" feed every 1 hour for a while- this is normal. Feed your baby whenever he/she is awake-  even if every hour for a while. This frequent feeding will help you make more milk and encourage your baby to sleep for longer stretches later in the evening or night.    - Position your baby close to you with pillows so he/she is facing you -belly to belly laying horizontally across your lap at the level of your breast and looking a bit \"upwards\" to your breast   -One hand holds the baby's neck behind the ears and the other hand holds your breast  -Baby's nose should start out pointing to your nipple before latching  - Hold your breast in a \"sandwich\" position by gently squeezing your breast in an oval shape and make sure your hands are not covering the areola  This \"nipple sandwich\" will make it easier for your breast to fit inside the baby's mouth-making latching more comfortable for you and baby and preventing sore nipples. Your baby should take a \"mouthful\" of breast!  - You may want to use hand expression to \"prime the pump\" and get a drip of milk out on your nipple to wake baby   (see website: newborns.[employer].edu/Breastfeeding/HandExpression.html)  - Swipe your nipple on baby's upper lip and wait for a BIG open mouth  - YOU bring baby to the breast (hold baby's neck with your fingers just below the ears) and bring baby's head to the breast--leading with the chin.  Try to avoid pushing your breast into baby's mouth- bring baby to you instead!  - Aim to get your baby's bottom lip LOW DOWN ON AREOLA (baby's upper lip just needs to \"clear\" the nipple) .   Your baby should latch onto the areola and NOT just the nipple. That way your baby gets more milk and you don't get sore nipples!      Useful web " sites:  Www.infantrisk.com  Www.aap.org  Www.ibreastfeeding.com  Www.health.[facility].mn.us

## 2022-02-15 NOTE — PROGRESS NOTES
Doing well.  Occasional BH and menstrual cramping.  Denies VB or leaking.  Increasing bilateral CT; wrist braces dispensed. Early breastfeeding and support discussed today.  Signs of labor reviewed.  Return in 1 week.

## 2022-02-15 NOTE — NURSING NOTE
"Chief Complaint   Patient presents with     Prenatal Care     37       Initial /64 (BP Location: Left arm, Patient Position: Sitting, Cuff Size: Adult Large)   Ht 1.626 m (5' 4\")   Wt 85.3 kg (188 lb)   BMI 32.27 kg/m   Estimated body mass index is 32.27 kg/m  as calculated from the following:    Height as of this encounter: 1.626 m (5' 4\").    Weight as of this encounter: 85.3 kg (188 lb).  Medication Reconciliation: complete  BETHANY NINO LPN  "

## 2022-02-22 ENCOUNTER — PRENATAL OFFICE VISIT (OUTPATIENT)
Dept: OBGYN | Facility: OTHER | Age: 27
End: 2022-02-22
Attending: NURSE PRACTITIONER
Payer: COMMERCIAL

## 2022-02-22 VITALS — BODY MASS INDEX: 32.44 KG/M2 | DIASTOLIC BLOOD PRESSURE: 72 MMHG | WEIGHT: 189 LBS | SYSTOLIC BLOOD PRESSURE: 122 MMHG

## 2022-02-22 DIAGNOSIS — U07.1 INFECTION DUE TO 2019 NOVEL CORONAVIRUS: ICD-10-CM

## 2022-02-22 PROCEDURE — 99207 PR PRENATAL VISIT: CPT | Performed by: OBSTETRICS & GYNECOLOGY

## 2022-02-22 PROCEDURE — G0463 HOSPITAL OUTPT CLINIC VISIT: HCPCS

## 2022-02-22 ASSESSMENT — PAIN SCALES - GENERAL: PAINLEVEL: NO PAIN (0)

## 2022-02-22 NOTE — PROGRESS NOTES
SUBJECTIVE  Candis Marcos is a 26 year old  with No LMP recorded. Patient is pregnant.. Estimated Date of Delivery: Mar 8, 2022. Gestational age is 38w0d. She presents for follow up OB visit.    She is doing well. She feels good fetal movement. No contractions. She denies leaking of fluid or vaginal bleeding. She denies abnormal vaginal discharge, difficulty urinating, fever or chills. No headache, vision changes, lower extremity swelling, upper abdominal pain, chest pain, or shortness of breath.  She denies depression symptoms.    OBJECTIVE  /72   Wt 85.7 kg (189 lb)   BMI 32.44 kg/m      General:  Well-developed well-nourished gravid female in no apparent distress. Alert and oriented x3.  Abdomen: Soft, gravid, non tender, positive bowel sounds. Fundal height at 38 cm. Fetal heart tones auscultated at 126.  Presentation noted to be cephalic by Leopold.  Cervix: 1 cm, 40 % effacement, -3 station, vertex presentation  Extremities:  No clubbing, cyanosis, or edema. Nontender bilaterally.    Chaperone: Alix Salguero N    DIAGNOSTICS  2022 GBS negative    ASSESSMENT / PLAN  1 Intrauterine pregnancy at 38w0d.    - Problem list reviewed and updated.  - Pregnancy weight gain has been 14.5 kg (32 lb) to date, which is adequate.  - GBS culture result reviewed.  - The patient plans to breast feed and she is undecided for contraception after delivery.  - I reviewed routine obstetrical precautions, recommendations and instructions with the patient including fetal movement and kick count instructions.  - I reviewed depression precautions.  - I reviewed pre-eclampsia precautions with the patient with instructions for the patient to come in for persistent headache unrelieved with Tylenol, blurry vision, right upper quadrant pain, shortness of breath, or significant persistent edema.  - I reviewed labor precautions with the patient with instructions for the patient to come in for decreased fetal movement,  suspected rupture of membranes, regular contraction pattern, vaginal bleeding or any other concerning symptoms.  - Follow up in 1 week.    Natan Greenwood MD  Obstetrics and Gynecology

## 2022-02-25 ENCOUNTER — MYC MEDICAL ADVICE (OUTPATIENT)
Dept: OBGYN | Facility: OTHER | Age: 27
End: 2022-02-25
Payer: COMMERCIAL

## 2022-03-01 ENCOUNTER — PRENATAL OFFICE VISIT (OUTPATIENT)
Dept: OBGYN | Facility: OTHER | Age: 27
End: 2022-03-01
Attending: OBSTETRICS & GYNECOLOGY
Payer: COMMERCIAL

## 2022-03-01 VITALS
WEIGHT: 190 LBS | SYSTOLIC BLOOD PRESSURE: 118 MMHG | BODY MASS INDEX: 32.44 KG/M2 | HEIGHT: 64 IN | DIASTOLIC BLOOD PRESSURE: 64 MMHG

## 2022-03-01 DIAGNOSIS — Z34.03 NORMAL FIRST PREGNANCY CONFIRMED, THIRD TRIMESTER: Primary | ICD-10-CM

## 2022-03-01 PROCEDURE — 99207 PR PRENATAL VISIT: CPT | Performed by: OBSTETRICS & GYNECOLOGY

## 2022-03-01 PROCEDURE — G0463 HOSPITAL OUTPT CLINIC VISIT: HCPCS

## 2022-03-01 ASSESSMENT — PAIN SCALES - GENERAL: PAINLEVEL: NO PAIN (0)

## 2022-03-01 NOTE — PROGRESS NOTES
Doing well.  No concerns today.  Discussed signs of labor and when to call or come in.  Discussed kick counts and fetal movement.  RTC in 1 week  Denies regular contractions, vaginal bleeding, HAMZAH Mesa MD  3/1/2022

## 2022-03-01 NOTE — NURSING NOTE
"Chief Complaint   Patient presents with     Prenatal Care     39 weeks       Initial /64 (BP Location: Left arm, Patient Position: Sitting, Cuff Size: Adult Regular)   Ht 1.626 m (5' 4\")   Wt 86.2 kg (190 lb)   BMI 32.61 kg/m   Estimated body mass index is 32.61 kg/m  as calculated from the following:    Height as of this encounter: 1.626 m (5' 4\").    Weight as of this encounter: 86.2 kg (190 lb).  Medication Reconciliation: complete  BETHANY NINO LPN  "

## 2022-03-08 ENCOUNTER — NURSE TRIAGE (OUTPATIENT)
Dept: NURSING | Facility: CLINIC | Age: 27
End: 2022-03-08

## 2022-03-08 ENCOUNTER — HOSPITAL ENCOUNTER (INPATIENT)
Facility: HOSPITAL | Age: 27
LOS: 3 days | Discharge: HOME | End: 2022-03-12
Attending: OBSTETRICS & GYNECOLOGY | Admitting: OBSTETRICS & GYNECOLOGY
Payer: COMMERCIAL

## 2022-03-08 ENCOUNTER — PRENATAL OFFICE VISIT (OUTPATIENT)
Dept: OBGYN | Facility: OTHER | Age: 27
End: 2022-03-08
Attending: OBSTETRICS & GYNECOLOGY
Payer: COMMERCIAL

## 2022-03-08 ENCOUNTER — HOSPITAL ENCOUNTER (EMERGENCY)
Facility: HOSPITAL | Age: 27
End: 2022-03-08
Payer: COMMERCIAL

## 2022-03-08 VITALS
WEIGHT: 184 LBS | BODY MASS INDEX: 31.41 KG/M2 | DIASTOLIC BLOOD PRESSURE: 72 MMHG | SYSTOLIC BLOOD PRESSURE: 132 MMHG | HEIGHT: 64 IN

## 2022-03-08 DIAGNOSIS — Z36.89 ENCOUNTER FOR TRIAGE IN PREGNANT PATIENT: ICD-10-CM

## 2022-03-08 DIAGNOSIS — O99.013 ANEMIA OF PREGNANCY, THIRD TRIMESTER: ICD-10-CM

## 2022-03-08 DIAGNOSIS — Z34.03 NORMAL FIRST PREGNANCY CONFIRMED, THIRD TRIMESTER: Primary | ICD-10-CM

## 2022-03-08 DIAGNOSIS — U07.1 INFECTION DUE TO 2019 NOVEL CORONAVIRUS: ICD-10-CM

## 2022-03-08 PROCEDURE — G0463 HOSPITAL OUTPT CLINIC VISIT: HCPCS | Performed by: OBSTETRICS & GYNECOLOGY

## 2022-03-08 PROCEDURE — 99207 PR PRENATAL VISIT: CPT | Performed by: OBSTETRICS & GYNECOLOGY

## 2022-03-08 RX ORDER — LIDOCAINE 40 MG/G
CREAM TOPICAL
Status: DISCONTINUED | OUTPATIENT
Start: 2022-03-08 | End: 2022-03-09 | Stop reason: HOSPADM

## 2022-03-08 ASSESSMENT — ACTIVITIES OF DAILY LIVING (ADL)
DIFFICULTY_EATING/SWALLOWING: NO
DRESSING/BATHING_DIFFICULTY: NO
DIFFICULTY_COMMUNICATING: NO
FALL_HISTORY_WITHIN_LAST_SIX_MONTHS: NO
HEARING_DIFFICULTY_OR_DEAF: NO
WEAR_GLASSES_OR_BLIND: YES
TOILETING_ISSUES: NO
CONCENTRATING,_REMEMBERING_OR_MAKING_DECISIONS_DIFFICULTY: NO
DOING_ERRANDS_INDEPENDENTLY_DIFFICULTY: NO
WALKING_OR_CLIMBING_STAIRS_DIFFICULTY: NO
VISION_MANAGEMENT: GLASSES AND CONTACTS
CHANGE_IN_FUNCTIONAL_STATUS_SINCE_ONSET_OF_CURRENT_ILLNESS/INJURY: NO

## 2022-03-08 ASSESSMENT — PAIN SCALES - GENERAL: PAINLEVEL: NO PAIN (0)

## 2022-03-08 NOTE — NURSING NOTE
"Chief Complaint   Patient presents with     Prenatal Care       Initial /72   Ht 1.626 m (5' 4\")   Wt 83.5 kg (184 lb)   BMI 31.58 kg/m   Estimated body mass index is 31.58 kg/m  as calculated from the following:    Height as of this encounter: 1.626 m (5' 4\").    Weight as of this encounter: 83.5 kg (184 lb).  Medication Reconciliation: complete  Reina Hardy LPN    "

## 2022-03-09 ENCOUNTER — ANESTHESIA (OUTPATIENT)
Dept: SURGERY | Facility: HOSPITAL | Age: 27
End: 2022-03-09
Payer: COMMERCIAL

## 2022-03-09 ENCOUNTER — ANESTHESIA EVENT (OUTPATIENT)
Dept: SURGERY | Facility: HOSPITAL | Age: 27
End: 2022-03-09
Payer: COMMERCIAL

## 2022-03-09 PROBLEM — O99.013 ANEMIA OF PREGNANCY, THIRD TRIMESTER: Status: ACTIVE | Noted: 2021-12-15

## 2022-03-09 PROBLEM — Z34.01 NORMAL FIRST PREGNANCY IN FIRST TRIMESTER: Status: RESOLVED | Noted: 2021-08-20 | Resolved: 2022-03-09

## 2022-03-09 LAB
ABO/RH(D): NORMAL
ALBUMIN SERPL-MCNC: 3.1 G/DL (ref 3.4–5)
ALBUMIN UR-MCNC: 30 MG/DL
ALP SERPL-CCNC: 135 U/L (ref 40–150)
ALT SERPL W P-5'-P-CCNC: 24 U/L (ref 0–50)
ANION GAP SERPL CALCULATED.3IONS-SCNC: 9 MMOL/L (ref 3–14)
ANTIBODY SCREEN: NEGATIVE
APPEARANCE UR: CLEAR
AST SERPL W P-5'-P-CCNC: 16 U/L (ref 0–45)
BACTERIA #/AREA URNS HPF: ABNORMAL /HPF
BASOPHILS # BLD AUTO: 0 10E3/UL (ref 0–0.2)
BASOPHILS NFR BLD AUTO: 0 %
BILIRUB SERPL-MCNC: 0.4 MG/DL (ref 0.2–1.3)
BILIRUB UR QL STRIP: NEGATIVE
BUN SERPL-MCNC: 8 MG/DL (ref 7–30)
CALCIUM SERPL-MCNC: 9.4 MG/DL (ref 8.5–10.1)
CHLORIDE BLD-SCNC: 106 MMOL/L (ref 94–109)
CO2 SERPL-SCNC: 20 MMOL/L (ref 20–32)
COLOR UR AUTO: YELLOW
CREAT SERPL-MCNC: 0.69 MG/DL (ref 0.52–1.04)
EOSINOPHIL # BLD AUTO: 0 10E3/UL (ref 0–0.7)
EOSINOPHIL NFR BLD AUTO: 0 %
ERYTHROCYTE [DISTWIDTH] IN BLOOD BY AUTOMATED COUNT: 14.8 % (ref 10–15)
GFR SERPL CREATININE-BSD FRML MDRD: >90 ML/MIN/1.73M2
GLUCOSE BLD-MCNC: 108 MG/DL (ref 70–99)
GLUCOSE UR STRIP-MCNC: NEGATIVE MG/DL
HCT VFR BLD AUTO: 38.4 % (ref 35–47)
HGB BLD-MCNC: 12 G/DL (ref 11.7–15.7)
HGB UR QL STRIP: NEGATIVE
HOLD SPECIMEN: NORMAL
IMM GRANULOCYTES # BLD: 0.1 10E3/UL
IMM GRANULOCYTES NFR BLD: 1 %
KETONES UR STRIP-MCNC: NEGATIVE MG/DL
LEUKOCYTE ESTERASE UR QL STRIP: ABNORMAL
LYMPHOCYTES # BLD AUTO: 1.9 10E3/UL (ref 0.8–5.3)
LYMPHOCYTES NFR BLD AUTO: 13 %
MCH RBC QN AUTO: 27 PG (ref 26.5–33)
MCHC RBC AUTO-ENTMCNC: 31.3 G/DL (ref 31.5–36.5)
MCV RBC AUTO: 86 FL (ref 78–100)
MONOCYTES # BLD AUTO: 0.7 10E3/UL (ref 0–1.3)
MONOCYTES NFR BLD AUTO: 4 %
MUCOUS THREADS #/AREA URNS LPF: PRESENT /LPF
NEUTROPHILS # BLD AUTO: 12.6 10E3/UL (ref 1.6–8.3)
NEUTROPHILS NFR BLD AUTO: 82 %
NITRATE UR QL: NEGATIVE
NRBC # BLD AUTO: 0 10E3/UL
NRBC BLD AUTO-RTO: 0 /100
PH UR STRIP: 6.5 [PH] (ref 4.7–8)
PLATELET # BLD AUTO: 212 10E3/UL (ref 150–450)
POTASSIUM BLD-SCNC: 4 MMOL/L (ref 3.4–5.3)
PROT SERPL-MCNC: 7.2 G/DL (ref 6.8–8.8)
RBC # BLD AUTO: 4.45 10E6/UL (ref 3.8–5.2)
RBC URINE: 0 /HPF
SODIUM SERPL-SCNC: 135 MMOL/L (ref 133–144)
SP GR UR STRIP: 1.03 (ref 1–1.03)
SPECIMEN EXPIRATION DATE: NORMAL
SQUAMOUS EPITHELIAL: 6 /HPF
T PALLIDUM AB SER QL: NONREACTIVE
UROBILINOGEN UR STRIP-MCNC: NORMAL MG/DL
WBC # BLD AUTO: 15.3 10E3/UL (ref 4–11)
WBC URINE: 8 /HPF

## 2022-03-09 PROCEDURE — 86901 BLOOD TYPING SEROLOGIC RH(D): CPT | Performed by: OBSTETRICS & GYNECOLOGY

## 2022-03-09 PROCEDURE — 710N000010 HC RECOVERY PHASE 1, LEVEL 2, PER MIN: Performed by: OBSTETRICS & GYNECOLOGY

## 2022-03-09 PROCEDURE — 80053 COMPREHEN METABOLIC PANEL: CPT | Performed by: OBSTETRICS & GYNECOLOGY

## 2022-03-09 PROCEDURE — 87086 URINE CULTURE/COLONY COUNT: CPT | Performed by: OBSTETRICS & GYNECOLOGY

## 2022-03-09 PROCEDURE — 59510 CESAREAN DELIVERY: CPT | Performed by: OBSTETRICS & GYNECOLOGY

## 2022-03-09 PROCEDURE — 85025 COMPLETE CBC W/AUTO DIFF WBC: CPT | Performed by: OBSTETRICS & GYNECOLOGY

## 2022-03-09 PROCEDURE — 59514 CESAREAN DELIVERY ONLY: CPT | Performed by: NURSE ANESTHETIST, CERTIFIED REGISTERED

## 2022-03-09 PROCEDURE — 370N000017 HC ANESTHESIA TECHNICAL FEE, PER MIN: Performed by: OBSTETRICS & GYNECOLOGY

## 2022-03-09 PROCEDURE — 250N000009 HC RX 250: Performed by: NURSE ANESTHETIST, CERTIFIED REGISTERED

## 2022-03-09 PROCEDURE — C9290 INJ, BUPIVACAINE LIPOSOME: HCPCS | Performed by: NURSE ANESTHETIST, CERTIFIED REGISTERED

## 2022-03-09 PROCEDURE — 250N000011 HC RX IP 250 OP 636: Performed by: NURSE ANESTHETIST, CERTIFIED REGISTERED

## 2022-03-09 PROCEDURE — 258N000003 HC RX IP 258 OP 636: Performed by: OBSTETRICS & GYNECOLOGY

## 2022-03-09 PROCEDURE — 250N000011 HC RX IP 250 OP 636: Performed by: OBSTETRICS & GYNECOLOGY

## 2022-03-09 PROCEDURE — 86850 RBC ANTIBODY SCREEN: CPT | Performed by: OBSTETRICS & GYNECOLOGY

## 2022-03-09 PROCEDURE — 360N000076 HC SURGERY LEVEL 3, PER MIN: Performed by: OBSTETRICS & GYNECOLOGY

## 2022-03-09 PROCEDURE — 250N000009 HC RX 250: Performed by: OBSTETRICS & GYNECOLOGY

## 2022-03-09 PROCEDURE — 250N000011 HC RX IP 250 OP 636

## 2022-03-09 PROCEDURE — 120N000001 HC R&B MED SURG/OB

## 2022-03-09 PROCEDURE — 86780 TREPONEMA PALLIDUM: CPT | Performed by: OBSTETRICS & GYNECOLOGY

## 2022-03-09 PROCEDURE — 36415 COLL VENOUS BLD VENIPUNCTURE: CPT | Performed by: OBSTETRICS & GYNECOLOGY

## 2022-03-09 PROCEDURE — 250N000013 HC RX MED GY IP 250 OP 250 PS 637: Performed by: OBSTETRICS & GYNECOLOGY

## 2022-03-09 PROCEDURE — 272N000001 HC OR GENERAL SUPPLY STERILE: Performed by: OBSTETRICS & GYNECOLOGY

## 2022-03-09 PROCEDURE — 81001 URINALYSIS AUTO W/SCOPE: CPT | Performed by: OBSTETRICS & GYNECOLOGY

## 2022-03-09 PROCEDURE — 258N000003 HC RX IP 258 OP 636: Performed by: NURSE ANESTHETIST, CERTIFIED REGISTERED

## 2022-03-09 PROCEDURE — 88307 TISSUE EXAM BY PATHOLOGIST: CPT | Mod: TC | Performed by: OBSTETRICS & GYNECOLOGY

## 2022-03-09 RX ORDER — ONDANSETRON 2 MG/ML
4 INJECTION INTRAMUSCULAR; INTRAVENOUS EVERY 6 HOURS PRN
Status: DISCONTINUED | OUTPATIENT
Start: 2022-03-09 | End: 2022-03-12 | Stop reason: HOSPADM

## 2022-03-09 RX ORDER — FENTANYL CITRATE 50 UG/ML
INJECTION, SOLUTION INTRAMUSCULAR; INTRAVENOUS PRN
Status: DISCONTINUED | OUTPATIENT
Start: 2022-03-09 | End: 2022-03-09

## 2022-03-09 RX ORDER — OXYCODONE HYDROCHLORIDE 5 MG/1
5 TABLET ORAL EVERY 4 HOURS PRN
Status: DISCONTINUED | OUTPATIENT
Start: 2022-03-09 | End: 2022-03-10

## 2022-03-09 RX ORDER — ACETAMINOPHEN 325 MG/1
975 TABLET ORAL EVERY 6 HOURS
Status: DISCONTINUED | OUTPATIENT
Start: 2022-03-09 | End: 2022-03-12 | Stop reason: HOSPADM

## 2022-03-09 RX ORDER — DIPHENHYDRAMINE HYDROCHLORIDE 50 MG/ML
25 INJECTION INTRAMUSCULAR; INTRAVENOUS EVERY 6 HOURS PRN
Status: DISCONTINUED | OUTPATIENT
Start: 2022-03-09 | End: 2022-03-12 | Stop reason: HOSPADM

## 2022-03-09 RX ORDER — NALOXONE HYDROCHLORIDE 0.4 MG/ML
0.4 INJECTION, SOLUTION INTRAMUSCULAR; INTRAVENOUS; SUBCUTANEOUS
Status: DISCONTINUED | OUTPATIENT
Start: 2022-03-09 | End: 2022-03-09 | Stop reason: HOSPADM

## 2022-03-09 RX ORDER — LIDOCAINE HYDROCHLORIDE 20 MG/ML
INJECTION, SOLUTION INFILTRATION; PERINEURAL PRN
Status: DISCONTINUED | OUTPATIENT
Start: 2022-03-09 | End: 2022-03-09

## 2022-03-09 RX ORDER — EPHEDRINE SULFATE 50 MG/ML
INJECTION, SOLUTION INTRAVENOUS PRN
Status: DISCONTINUED | OUTPATIENT
Start: 2022-03-09 | End: 2022-03-09

## 2022-03-09 RX ORDER — BUTORPHANOL TARTRATE 1 MG/ML
1 INJECTION, SOLUTION INTRAMUSCULAR; INTRAVENOUS
Status: DISCONTINUED | OUTPATIENT
Start: 2022-03-09 | End: 2022-03-12 | Stop reason: HOSPADM

## 2022-03-09 RX ORDER — AMOXICILLIN 250 MG
2 CAPSULE ORAL 2 TIMES DAILY
Status: DISCONTINUED | OUTPATIENT
Start: 2022-03-09 | End: 2022-03-12 | Stop reason: HOSPADM

## 2022-03-09 RX ORDER — PROCHLORPERAZINE 25 MG
25 SUPPOSITORY, RECTAL RECTAL EVERY 12 HOURS PRN
Status: DISCONTINUED | OUTPATIENT
Start: 2022-03-09 | End: 2022-03-09 | Stop reason: HOSPADM

## 2022-03-09 RX ORDER — OXYTOCIN/0.9 % SODIUM CHLORIDE 30/500 ML
PLASTIC BAG, INJECTION (ML) INTRAVENOUS CONTINUOUS PRN
Status: DISCONTINUED | OUTPATIENT
Start: 2022-03-09 | End: 2022-03-09

## 2022-03-09 RX ORDER — NALOXONE HYDROCHLORIDE 0.4 MG/ML
0.4 INJECTION, SOLUTION INTRAMUSCULAR; INTRAVENOUS; SUBCUTANEOUS
Status: DISCONTINUED | OUTPATIENT
Start: 2022-03-09 | End: 2022-03-12 | Stop reason: HOSPADM

## 2022-03-09 RX ORDER — ONDANSETRON 4 MG/1
4 TABLET, ORALLY DISINTEGRATING ORAL EVERY 6 HOURS PRN
Status: DISCONTINUED | OUTPATIENT
Start: 2022-03-09 | End: 2022-03-09 | Stop reason: HOSPADM

## 2022-03-09 RX ORDER — OXYTOCIN/0.9 % SODIUM CHLORIDE 30/500 ML
100-340 PLASTIC BAG, INJECTION (ML) INTRAVENOUS CONTINUOUS PRN
Status: DISCONTINUED | OUTPATIENT
Start: 2022-03-09 | End: 2022-03-09

## 2022-03-09 RX ORDER — ONDANSETRON 2 MG/ML
INJECTION INTRAMUSCULAR; INTRAVENOUS PRN
Status: DISCONTINUED | OUTPATIENT
Start: 2022-03-09 | End: 2022-03-09

## 2022-03-09 RX ORDER — MISOPROSTOL 200 UG/1
400 TABLET ORAL
Status: DISCONTINUED | OUTPATIENT
Start: 2022-03-09 | End: 2022-03-09 | Stop reason: HOSPADM

## 2022-03-09 RX ORDER — OXYTOCIN 10 [USP'U]/ML
10 INJECTION, SOLUTION INTRAMUSCULAR; INTRAVENOUS
Status: DISCONTINUED | OUTPATIENT
Start: 2022-03-09 | End: 2022-03-12 | Stop reason: HOSPADM

## 2022-03-09 RX ORDER — DEXMEDETOMIDINE HYDROCHLORIDE 100 UG/ML
INJECTION, SOLUTION, CONCENTRATE INTRAVENOUS PRN
Status: DISCONTINUED | OUTPATIENT
Start: 2022-03-09 | End: 2022-03-09

## 2022-03-09 RX ORDER — ONDANSETRON 4 MG/1
4 TABLET, ORALLY DISINTEGRATING ORAL EVERY 6 HOURS PRN
Status: DISCONTINUED | OUTPATIENT
Start: 2022-03-09 | End: 2022-03-12 | Stop reason: HOSPADM

## 2022-03-09 RX ORDER — HYDROCORTISONE 2.5 %
CREAM (GRAM) TOPICAL 3 TIMES DAILY PRN
Status: DISCONTINUED | OUTPATIENT
Start: 2022-03-09 | End: 2022-03-12 | Stop reason: HOSPADM

## 2022-03-09 RX ORDER — TRANEXAMIC ACID 10 MG/ML
1 INJECTION, SOLUTION INTRAVENOUS EVERY 30 MIN PRN
Status: DISCONTINUED | OUTPATIENT
Start: 2022-03-09 | End: 2022-03-09 | Stop reason: HOSPADM

## 2022-03-09 RX ORDER — FENTANYL CITRATE 50 UG/ML
INJECTION, SOLUTION INTRAMUSCULAR; INTRAVENOUS
Status: COMPLETED
Start: 2022-03-09 | End: 2022-03-09

## 2022-03-09 RX ORDER — EPHEDRINE SULFATE 50 MG/ML
5 INJECTION, SOLUTION INTRAMUSCULAR; INTRAVENOUS; SUBCUTANEOUS
Status: DISCONTINUED | OUTPATIENT
Start: 2022-03-09 | End: 2022-03-09 | Stop reason: HOSPADM

## 2022-03-09 RX ORDER — CEFOXITIN SODIUM 2 G/50ML
2 INJECTION, SOLUTION INTRAVENOUS
Status: COMPLETED | OUTPATIENT
Start: 2022-03-09 | End: 2022-03-09

## 2022-03-09 RX ORDER — LIDOCAINE 40 MG/G
CREAM TOPICAL
Status: DISCONTINUED | OUTPATIENT
Start: 2022-03-09 | End: 2022-03-12 | Stop reason: HOSPADM

## 2022-03-09 RX ORDER — METOCLOPRAMIDE 10 MG/1
10 TABLET ORAL EVERY 6 HOURS PRN
Status: DISCONTINUED | OUTPATIENT
Start: 2022-03-09 | End: 2022-03-12 | Stop reason: HOSPADM

## 2022-03-09 RX ORDER — LIDOCAINE HYDROCHLORIDE 20 MG/ML
INJECTION, SOLUTION EPIDURAL; INFILTRATION; INTRACAUDAL; PERINEURAL
Status: DISCONTINUED
Start: 2022-03-09 | End: 2022-03-09 | Stop reason: HOSPADM

## 2022-03-09 RX ORDER — OXYTOCIN/0.9 % SODIUM CHLORIDE 30/500 ML
340 PLASTIC BAG, INJECTION (ML) INTRAVENOUS CONTINUOUS PRN
Status: DISCONTINUED | OUTPATIENT
Start: 2022-03-09 | End: 2022-03-09 | Stop reason: HOSPADM

## 2022-03-09 RX ORDER — NALOXONE HYDROCHLORIDE 0.4 MG/ML
0.2 INJECTION, SOLUTION INTRAMUSCULAR; INTRAVENOUS; SUBCUTANEOUS
Status: DISCONTINUED | OUTPATIENT
Start: 2022-03-09 | End: 2022-03-09 | Stop reason: HOSPADM

## 2022-03-09 RX ORDER — METOCLOPRAMIDE HYDROCHLORIDE 5 MG/ML
10 INJECTION INTRAMUSCULAR; INTRAVENOUS EVERY 6 HOURS PRN
Status: DISCONTINUED | OUTPATIENT
Start: 2022-03-09 | End: 2022-03-12 | Stop reason: HOSPADM

## 2022-03-09 RX ORDER — VITAMIN A ACETATE, .BETA.-CAROTENE, ASCORBIC ACID, CHOLECALCIFEROL, .ALPHA.-TOCOPHEROL ACETATE, DL-, THIAMINE MONONITRATE, RIBOFLAVIN, NIACINAMIDE, PYRIDOXINE HYDROCHLORIDE, FOLIC ACID, CYANOCOBALAMIN, CALCIUM CARBONATE, FERROUS FUMARATE, ZINC OXIDE, AND CUPRIC OXIDE 2000; 2000; 120; 400; 22; 1.84; 3; 20; 10; 1; 12; 200; 27; 25; 2 [IU]/1; [IU]/1; MG/1; [IU]/1; MG/1; MG/1; MG/1; MG/1; MG/1; MG/1; UG/1; MG/1; MG/1; MG/1; MG/1
1 TABLET ORAL DAILY
Status: CANCELLED | OUTPATIENT
Start: 2022-03-09

## 2022-03-09 RX ORDER — TRANEXAMIC ACID 10 MG/ML
1 INJECTION, SOLUTION INTRAVENOUS EVERY 30 MIN PRN
Status: DISCONTINUED | OUTPATIENT
Start: 2022-03-09 | End: 2022-03-12 | Stop reason: HOSPADM

## 2022-03-09 RX ORDER — OXYTOCIN 10 [USP'U]/ML
10 INJECTION, SOLUTION INTRAMUSCULAR; INTRAVENOUS
Status: DISCONTINUED | OUTPATIENT
Start: 2022-03-09 | End: 2022-03-09

## 2022-03-09 RX ORDER — BUPIVACAINE HYDROCHLORIDE 2.5 MG/ML
INJECTION, SOLUTION EPIDURAL; INFILTRATION; INTRACAUDAL
Status: COMPLETED | OUTPATIENT
Start: 2022-03-09 | End: 2022-03-09

## 2022-03-09 RX ORDER — SIMETHICONE 80 MG
80 TABLET,CHEWABLE ORAL 4 TIMES DAILY PRN
Status: DISCONTINUED | OUTPATIENT
Start: 2022-03-09 | End: 2022-03-12 | Stop reason: HOSPADM

## 2022-03-09 RX ORDER — BUPIVACAINE HYDROCHLORIDE 7.5 MG/ML
INJECTION, SOLUTION INTRASPINAL
Status: COMPLETED | OUTPATIENT
Start: 2022-03-09 | End: 2022-03-09

## 2022-03-09 RX ORDER — METOCLOPRAMIDE HYDROCHLORIDE 5 MG/ML
10 INJECTION INTRAMUSCULAR; INTRAVENOUS EVERY 6 HOURS PRN
Status: DISCONTINUED | OUTPATIENT
Start: 2022-03-09 | End: 2022-03-09 | Stop reason: HOSPADM

## 2022-03-09 RX ORDER — PHENYLEPHRINE HYDROCHLORIDE 10 MG/ML
INJECTION INTRAVENOUS PRN
Status: DISCONTINUED | OUTPATIENT
Start: 2022-03-09 | End: 2022-03-09

## 2022-03-09 RX ORDER — IBUPROFEN 800 MG/1
800 TABLET, FILM COATED ORAL EVERY 6 HOURS
Status: DISCONTINUED | OUTPATIENT
Start: 2022-03-10 | End: 2022-03-10

## 2022-03-09 RX ORDER — TRANEXAMIC ACID 10 MG/ML
1 INJECTION, SOLUTION INTRAVENOUS ONCE
Status: COMPLETED | OUTPATIENT
Start: 2022-03-09 | End: 2022-03-09

## 2022-03-09 RX ORDER — KETOROLAC TROMETHAMINE 30 MG/ML
30 INJECTION, SOLUTION INTRAMUSCULAR; INTRAVENOUS
Status: DISCONTINUED | OUTPATIENT
Start: 2022-03-09 | End: 2022-03-12 | Stop reason: HOSPADM

## 2022-03-09 RX ORDER — PROCHLORPERAZINE MALEATE 10 MG
10 TABLET ORAL EVERY 6 HOURS PRN
Status: DISCONTINUED | OUTPATIENT
Start: 2022-03-09 | End: 2022-03-09 | Stop reason: HOSPADM

## 2022-03-09 RX ORDER — SODIUM CHLORIDE, SODIUM LACTATE, POTASSIUM CHLORIDE, CALCIUM CHLORIDE 600; 310; 30; 20 MG/100ML; MG/100ML; MG/100ML; MG/100ML
INJECTION, SOLUTION INTRAVENOUS CONTINUOUS
Status: DISCONTINUED | OUTPATIENT
Start: 2022-03-09 | End: 2022-03-09 | Stop reason: HOSPADM

## 2022-03-09 RX ORDER — NALOXONE HYDROCHLORIDE 0.4 MG/ML
0.2 INJECTION, SOLUTION INTRAMUSCULAR; INTRAVENOUS; SUBCUTANEOUS
Status: DISCONTINUED | OUTPATIENT
Start: 2022-03-09 | End: 2022-03-12 | Stop reason: HOSPADM

## 2022-03-09 RX ORDER — DEXTROSE, SODIUM CHLORIDE, SODIUM LACTATE, POTASSIUM CHLORIDE, AND CALCIUM CHLORIDE 5; .6; .31; .03; .02 G/100ML; G/100ML; G/100ML; G/100ML; G/100ML
INJECTION, SOLUTION INTRAVENOUS CONTINUOUS
Status: DISCONTINUED | OUTPATIENT
Start: 2022-03-09 | End: 2022-03-11

## 2022-03-09 RX ORDER — BISACODYL 10 MG
10 SUPPOSITORY, RECTAL RECTAL DAILY PRN
Status: DISCONTINUED | OUTPATIENT
Start: 2022-03-11 | End: 2022-03-12 | Stop reason: HOSPADM

## 2022-03-09 RX ORDER — DIPHENHYDRAMINE HCL 25 MG
25 CAPSULE ORAL EVERY 6 HOURS PRN
Status: DISCONTINUED | OUTPATIENT
Start: 2022-03-09 | End: 2022-03-09

## 2022-03-09 RX ORDER — AMOXICILLIN 250 MG
1 CAPSULE ORAL 2 TIMES DAILY
Status: DISCONTINUED | OUTPATIENT
Start: 2022-03-09 | End: 2022-03-12 | Stop reason: HOSPADM

## 2022-03-09 RX ORDER — IBUPROFEN 600 MG/1
600 TABLET, FILM COATED ORAL
Status: DISCONTINUED | OUTPATIENT
Start: 2022-03-09 | End: 2022-03-12 | Stop reason: HOSPADM

## 2022-03-09 RX ORDER — LIDOCAINE 40 MG/G
CREAM TOPICAL
Status: DISCONTINUED | OUTPATIENT
Start: 2022-03-09 | End: 2022-03-09 | Stop reason: HOSPADM

## 2022-03-09 RX ORDER — OXYTOCIN/0.9 % SODIUM CHLORIDE 30/500 ML
100-340 PLASTIC BAG, INJECTION (ML) INTRAVENOUS CONTINUOUS PRN
Status: DISCONTINUED | OUTPATIENT
Start: 2022-03-09 | End: 2022-03-12 | Stop reason: HOSPADM

## 2022-03-09 RX ORDER — FERROUS GLUCONATE 324(38)MG
324 TABLET ORAL
Status: CANCELLED | OUTPATIENT
Start: 2022-03-10

## 2022-03-09 RX ORDER — MODIFIED LANOLIN
OINTMENT (GRAM) TOPICAL
Status: DISCONTINUED | OUTPATIENT
Start: 2022-03-09 | End: 2022-03-12 | Stop reason: HOSPADM

## 2022-03-09 RX ORDER — KETOROLAC TROMETHAMINE 30 MG/ML
30 INJECTION, SOLUTION INTRAMUSCULAR; INTRAVENOUS EVERY 6 HOURS
Status: COMPLETED | OUTPATIENT
Start: 2022-03-09 | End: 2022-03-10

## 2022-03-09 RX ORDER — METOCLOPRAMIDE 10 MG/1
10 TABLET ORAL EVERY 6 HOURS PRN
Status: DISCONTINUED | OUTPATIENT
Start: 2022-03-09 | End: 2022-03-09 | Stop reason: HOSPADM

## 2022-03-09 RX ORDER — OXYTOCIN/0.9 % SODIUM CHLORIDE 30/500 ML
1-24 PLASTIC BAG, INJECTION (ML) INTRAVENOUS CONTINUOUS
Status: DISCONTINUED | OUTPATIENT
Start: 2022-03-09 | End: 2022-03-09 | Stop reason: HOSPADM

## 2022-03-09 RX ORDER — MISOPROSTOL 200 UG/1
400 TABLET ORAL
Status: DISCONTINUED | OUTPATIENT
Start: 2022-03-09 | End: 2022-03-12 | Stop reason: HOSPADM

## 2022-03-09 RX ORDER — OXYTOCIN 10 [USP'U]/ML
10 INJECTION, SOLUTION INTRAMUSCULAR; INTRAVENOUS
Status: DISCONTINUED | OUTPATIENT
Start: 2022-03-09 | End: 2022-03-09 | Stop reason: HOSPADM

## 2022-03-09 RX ORDER — OXYTOCIN/0.9 % SODIUM CHLORIDE 30/500 ML
340 PLASTIC BAG, INJECTION (ML) INTRAVENOUS CONTINUOUS PRN
Status: DISCONTINUED | OUTPATIENT
Start: 2022-03-09 | End: 2022-03-12 | Stop reason: HOSPADM

## 2022-03-09 RX ORDER — OXYTOCIN/0.9 % SODIUM CHLORIDE 30/500 ML
340 PLASTIC BAG, INJECTION (ML) INTRAVENOUS CONTINUOUS PRN
Status: DISCONTINUED | OUTPATIENT
Start: 2022-03-09 | End: 2022-03-09

## 2022-03-09 RX ORDER — DIPHENHYDRAMINE HCL 25 MG
25 CAPSULE ORAL EVERY 6 HOURS PRN
Status: DISCONTINUED | OUTPATIENT
Start: 2022-03-09 | End: 2022-03-12 | Stop reason: HOSPADM

## 2022-03-09 RX ORDER — CITRIC ACID/SODIUM CITRATE 334-500MG
30 SOLUTION, ORAL ORAL
Status: COMPLETED | OUTPATIENT
Start: 2022-03-09 | End: 2022-03-09

## 2022-03-09 RX ORDER — PROCHLORPERAZINE MALEATE 10 MG
10 TABLET ORAL EVERY 6 HOURS PRN
Status: DISCONTINUED | OUTPATIENT
Start: 2022-03-09 | End: 2022-03-12 | Stop reason: HOSPADM

## 2022-03-09 RX ORDER — OXYTOCIN 10 [USP'U]/ML
INJECTION, SOLUTION INTRAMUSCULAR; INTRAVENOUS
Status: DISCONTINUED
Start: 2022-03-09 | End: 2022-03-09 | Stop reason: WASHOUT

## 2022-03-09 RX ORDER — LIDOCAINE HYDROCHLORIDE AND EPINEPHRINE 15; 5 MG/ML; UG/ML
INJECTION, SOLUTION EPIDURAL PRN
Status: DISCONTINUED | OUTPATIENT
Start: 2022-03-09 | End: 2022-03-09

## 2022-03-09 RX ORDER — PROCHLORPERAZINE 25 MG
25 SUPPOSITORY, RECTAL RECTAL EVERY 12 HOURS PRN
Status: DISCONTINUED | OUTPATIENT
Start: 2022-03-09 | End: 2022-03-12 | Stop reason: HOSPADM

## 2022-03-09 RX ORDER — ACETAMINOPHEN 325 MG/1
975 TABLET ORAL ONCE
Status: COMPLETED | OUTPATIENT
Start: 2022-03-09 | End: 2022-03-09

## 2022-03-09 RX ORDER — SODIUM CHLORIDE, SODIUM LACTATE, POTASSIUM CHLORIDE, CALCIUM CHLORIDE 600; 310; 30; 20 MG/100ML; MG/100ML; MG/100ML; MG/100ML
INJECTION, SOLUTION INTRAVENOUS CONTINUOUS PRN
Status: DISCONTINUED | OUTPATIENT
Start: 2022-03-09 | End: 2022-03-09 | Stop reason: HOSPADM

## 2022-03-09 RX ORDER — DIPHENHYDRAMINE HYDROCHLORIDE 50 MG/ML
25 INJECTION INTRAMUSCULAR; INTRAVENOUS EVERY 6 HOURS PRN
Status: DISCONTINUED | OUTPATIENT
Start: 2022-03-09 | End: 2022-03-09

## 2022-03-09 RX ORDER — ONDANSETRON 2 MG/ML
4 INJECTION INTRAMUSCULAR; INTRAVENOUS EVERY 6 HOURS PRN
Status: DISCONTINUED | OUTPATIENT
Start: 2022-03-09 | End: 2022-03-09 | Stop reason: HOSPADM

## 2022-03-09 RX ADMIN — KETOROLAC TROMETHAMINE 30 MG: 30 INJECTION, SOLUTION INTRAMUSCULAR at 15:38

## 2022-03-09 RX ADMIN — LIDOCAINE HYDROCHLORIDE 5 ML: 20 INJECTION, SOLUTION INFILTRATION; PERINEURAL at 11:22

## 2022-03-09 RX ADMIN — SODIUM CHLORIDE, POTASSIUM CHLORIDE, SODIUM LACTATE AND CALCIUM CHLORIDE 1000 ML: 600; 310; 30; 20 INJECTION, SOLUTION INTRAVENOUS at 04:19

## 2022-03-09 RX ADMIN — ACETAMINOPHEN 975 MG: 325 TABLET, FILM COATED ORAL at 12:58

## 2022-03-09 RX ADMIN — EPHEDRINE SULFATE 10 MG: 50 INJECTION, SOLUTION INTRAVENOUS at 13:38

## 2022-03-09 RX ADMIN — CEFOXITIN SODIUM 2 G: 2 INJECTION, SOLUTION INTRAVENOUS at 12:44

## 2022-03-09 RX ADMIN — BUPIVACAINE HYDROCHLORIDE 30 ML: 2.5 INJECTION, SOLUTION EPIDURAL; INFILTRATION; INTRACAUDAL at 14:38

## 2022-03-09 RX ADMIN — ACETAMINOPHEN 975 MG: 325 TABLET, FILM COATED ORAL at 23:46

## 2022-03-09 RX ADMIN — SODIUM CITRATE AND CITRIC ACID MONOHYDRATE 30 ML: 500; 334 SOLUTION ORAL at 12:45

## 2022-03-09 RX ADMIN — Medication 1 MILLI-UNITS/MIN: at 09:17

## 2022-03-09 RX ADMIN — SODIUM CHLORIDE, POTASSIUM CHLORIDE, SODIUM LACTATE AND CALCIUM CHLORIDE: 600; 310; 30; 20 INJECTION, SOLUTION INTRAVENOUS at 13:43

## 2022-03-09 RX ADMIN — ONDANSETRON 4 MG: 2 INJECTION INTRAMUSCULAR; INTRAVENOUS at 07:42

## 2022-03-09 RX ADMIN — ONDANSETRON 4 MG: 2 INJECTION INTRAMUSCULAR; INTRAVENOUS at 13:38

## 2022-03-09 RX ADMIN — BUTORPHANOL TARTRATE 1 MG: 2 INJECTION, SOLUTION INTRAMUSCULAR; INTRAVENOUS at 03:09

## 2022-03-09 RX ADMIN — SODIUM CHLORIDE, POTASSIUM CHLORIDE, SODIUM LACTATE AND CALCIUM CHLORIDE: 600; 310; 30; 20 INJECTION, SOLUTION INTRAVENOUS at 15:28

## 2022-03-09 RX ADMIN — TRANEXAMIC ACID 1 G: 10 INJECTION, SOLUTION INTRAVENOUS at 13:43

## 2022-03-09 RX ADMIN — OXYTOCIN-SODIUM CHLORIDE 0.9% IV SOLN 30 UNIT/500ML 500 ML/HR: 30-0.9/5 SOLUTION at 13:53

## 2022-03-09 RX ADMIN — Medication 3 ML: at 04:22

## 2022-03-09 RX ADMIN — SENNOSIDES AND DOCUSATE SODIUM 2 TABLET: 50; 8.6 TABLET ORAL at 20:43

## 2022-03-09 RX ADMIN — PHENYLEPHRINE HYDROCHLORIDE 100 MCG: 10 INJECTION INTRAVENOUS at 13:47

## 2022-03-09 RX ADMIN — BUPIVACAINE 20 ML: 13.3 INJECTION, SUSPENSION, LIPOSOMAL INFILTRATION at 14:38

## 2022-03-09 RX ADMIN — OXYTOCIN-SODIUM CHLORIDE 0.9% IV SOLN 30 UNIT/500ML 500 ML: 30-0.9/5 SOLUTION at 14:13

## 2022-03-09 RX ADMIN — KETOROLAC TROMETHAMINE 30 MG: 30 INJECTION, SOLUTION INTRAMUSCULAR at 21:02

## 2022-03-09 RX ADMIN — PHENYLEPHRINE HYDROCHLORIDE 200 MCG: 10 INJECTION INTRAVENOUS at 13:39

## 2022-03-09 RX ADMIN — Medication 10 ML/HR: at 04:31

## 2022-03-09 RX ADMIN — DEXMEDETOMIDINE 10 MCG: 100 INJECTION, SOLUTION, CONCENTRATE INTRAVENOUS at 13:33

## 2022-03-09 RX ADMIN — FENTANYL CITRATE 100 MCG: 50 INJECTION, SOLUTION INTRAMUSCULAR; INTRAVENOUS at 11:21

## 2022-03-09 RX ADMIN — BUPIVACAINE HYDROCHLORIDE IN DEXTROSE 1.4 ML: 7.5 INJECTION, SOLUTION SUBARACHNOID at 13:33

## 2022-03-09 ASSESSMENT — ACTIVITIES OF DAILY LIVING (ADL)
ADLS_ACUITY_SCORE: 4

## 2022-03-09 NOTE — ANESTHESIA PROCEDURE NOTES
Intrathecal injection Procedure Note    Pre-Procedure   Staff -        CRNA: Jr Limon APRN CRNA       Other Anesthesia Staff: Oneyda Wakefield       Performed By: CRNA and MINH       Location: OR       Procedure Start/Stop Times: 3/9/2022 1:18 PM and 3/9/2022 1:33 PM       Pre-Anesthestic Checklist: patient identified, IV checked, risks and benefits discussed, informed consent, monitors and equipment checked, pre-op evaluation, at physician/surgeon's request and post-op pain management  Timeout:       Correct Patient: Yes        Correct Procedure: Yes        Correct Site: Yes        Correct Position: Yes   Procedure Documentation  Procedure: intrathecal injection       Patient Position: sitting       Skin prep: Betadine       Insertion Site: L3-4. (midline approach).       Needle Gauge: 25.        Needle Length (Inches): 3.5        Spinal Needle Type: Pencan       Introducer used       Introducer: 20 G       # of attempts: 2 and  # of redirects:  3    Assessment/Narrative         CSF fluid: clear.      Opening pressure was cmH2O while  Sitting.      Medication(s) Administered   0.75% Hyperbaric Bupivacaine (Intrathecal), 1.4 mL  Medication Administration Time: 3/9/2022 1:33 PM

## 2022-03-09 NOTE — PROGRESS NOTES
Doing well.  No concerns today.  She will report to OB if contractions every 5-10 minutes or if rupture of membranes.  RTC in 1 week  Denies regular contractions, vaginal bleeding, HAMZAH Mesa MD  3/9/2022

## 2022-03-09 NOTE — ANESTHESIA PROCEDURE NOTES
Epidural catheter Procedure Note    Pre-Procedure   Staff -        CRNA: Elana Malik APRN CRNA       Performed By: CRNA       Procedure Start/Stop Times: 3/9/2022 4:19 AM and 3/9/2022 4:24 AM       Pre-Anesthestic Checklist: patient identified, IV checked, risks and benefits discussed, informed consent, monitors and equipment checked, pre-op evaluation, at physician/surgeon's request and post-op pain management  Timeout:       Correct Patient: Yes        Correct Procedure: Yes        Correct Site: Yes        Correct Position: Yes   Procedure Documentation  Procedure: epidural catheter       Patient Position: sitting       Patient Prep/Sterile Barriers: sterile gloves, mask, patient draped       Skin prep: Betadine       Local skin infiltrated with 3 mL of 1% lidocaine.        Insertion Site: L3-4. (midline approach).       Technique: LORT saline        FRED at 7 cm.       Needle Type: Touhy needle       Needle Gauge: 20.        Needle Length (Inches): 3.5        Catheter: 20 G.         Catheter threaded easily.         5 cm epidural space.         Threaded 12 cm at skin.        # of attempts: 1 and  # of redirects:     Assessment/Narrative         Paresthesias: No.       Test dose of 3 mL lidocaine 1.5% w/ 1:200,000 epinephrine at 04:22 CST.         Test dose negative, 3 minutes after injection, for signs of intravascular, subdural, or intrathecal injection.       Insertion/Infusion Method: LORT saline       Aspiration negative for Heme or CSF via Epidural Catheter.

## 2022-03-09 NOTE — ANESTHESIA PROCEDURE NOTES
TAP Procedure Note    Pre-Procedure   Staff -        CRNA: Jr Limon APRN CRNA       Performed By: CRNA       Location: OR       Procedure Start/Stop Times: 3/9/2022 2:30 PM and 3/9/2022 2:39 PM       Pre-Anesthestic Checklist: patient identified, IV checked, site marked, risks and benefits discussed, informed consent, monitors and equipment checked, pre-op evaluation, at physician/surgeon's request and post-op pain management  Timeout:       Correct Patient: Yes        Correct Procedure: Yes        Correct Site: Yes        Correct Position: Yes        Correct Laterality: Yes        Site Marked: Yes  Procedure Documentation  Procedure: TAP       Diagnosis: POST OP PAIN CONTROL       Laterality: bilateral       Patient Position: supine       Skin prep: Chloraprep       Needle Type: insulated and short bevel       Needle Gauge: 20.        Needle Length (Inches): 4        Ultrasound guided       1. Ultrasound was used to identify targeted nerve, plexus, vascular marker, or fascial plane and place a needle adjacent to it in real-time.       2. Ultrasound was used to visualize the spread of anesthetic in close proximity to the above referenced structure.       3. A permanent image is entered into the patient's record.       4. The visualized anatomic structures appeared normal.       5. There were no apparent abnormal pathologic findings.    Assessment/Narrative         The placement was negative for: blood aspirated, painful injection and site bleeding       Paresthesias: No.     Bolus given via needle..        Secured via.        Insertion/Infusion Method: Single Shot       Complications: none       Injection made incrementally with aspirations every 5 mL.    Medication(s) Administered   Bupivacaine 0.25% PF (Infiltration), 30 mL  Bupivacaine liposome (Exparel) 1.3% LA inj susp (Infiltration), 20 mL  Medication Administration Time: 3/9/2022 2:38 PM     Comments:  Risks for regional anesthesia include but  not limited to: infection, seizures, continued weakness or numbness, pain at injection site, injury to blood vessels    Given in 2 dose, split for each side

## 2022-03-09 NOTE — ANESTHESIA POSTPROCEDURE EVALUATION
Patient: Candis Marcos    Procedure: Procedure(s):  PRIMARY  SECTION       Anesthesia Type:  No value filed.    Note:  Disposition: Inpatient   Postop Pain Control: Uneventful            Sign Out: Well controlled pain   PONV: No   Neuro/Psych: Uneventful            Sign Out: Acceptable/Baseline neuro status   Airway/Respiratory: Uneventful            Sign Out: Acceptable/Baseline resp. status   CV/Hemodynamics: Uneventful            Sign Out: Acceptable CV status; No obvious hypovolemia; No obvious fluid overload   Other NRE: NONE   DID A NON-ROUTINE EVENT OCCUR? No           Last vitals:  Vitals Value Taken Time   /53 22 1545   Temp 98.2  F (36.8  C) 22 1445   Pulse 58 22 1547   Resp 14 22 1547   SpO2 96 % 22 1547   Vitals shown include unvalidated device data.    Electronically Signed By: GREGORY Wilson CRNA  2022  3:48 PM

## 2022-03-09 NOTE — ANESTHESIA PREPROCEDURE EVALUATION
Anesthesia Pre-Procedure Evaluation    Patient: Candis Marcos   MRN: [id number] : 1995        Procedure : * No procedures listed *          Past Medical History:   Diagnosis Date     Anemia of pregnancy, third trimester 12/15/2021    Iron 325 mg daily     Infection due to 2019 novel coronavirus 2022      History reviewed. No pertinent surgical history.   No Known Allergies   Social History     Tobacco Use     Smoking status: Never Smoker     Smokeless tobacco: Never Used   Substance Use Topics     Alcohol use: No      Wt Readings from Last 1 Encounters:   22 83.5 kg (184 lb)        Anesthesia Evaluation   Pt has not had prior anesthetic         ROS/MED HX  ENT/Pulmonary:  - neg pulmonary ROS     Neurologic:  - neg neurologic ROS     Cardiovascular:  - neg cardiovascular ROS     METS/Exercise Tolerance:     Hematologic:     (+) anemia (H/H ),     Musculoskeletal:  - neg musculoskeletal ROS     GI/Hepatic:  - neg GI/hepatic ROS     Renal/Genitourinary:  - neg Renal ROS     Endo:  - neg endo ROS     Psychiatric/Substance Use:  - neg psychiatric ROS     Infectious Disease:  - neg infectious disease ROS     Malignancy:  - neg malignancy ROS     Other:      (+) Possibly pregnant (G1, failure to progress), ,         Physical Exam    Airway        Mallampati: II   TM distance: > 3 FB   Neck ROM: full   Mouth opening: > 3 cm    Respiratory Devices and Support         Dental  no notable dental history         Cardiovascular   cardiovascular exam normal       Rhythm and rate: regular and normal     Pulmonary   pulmonary exam normal        breath sounds clear to auscultation           OUTSIDE LABS:  CBC:   Lab Results   Component Value Date    WBC 15.3 (H) 2022    WBC 11.3 (H) 2021    HGB 12.0 2022    HGB 10.7 (L) 2021    HCT 38.4 2022    HCT 32.9 (L) 2021     2022     2021     BMP:   Lab Results   Component Value Date      03/09/2022     12/24/2014    POTASSIUM 4.0 03/09/2022    POTASSIUM 3.6 12/24/2014    CHLORIDE 106 03/09/2022    CHLORIDE 102 12/24/2014    CO2 20 03/09/2022    CO2 29 12/24/2014    BUN 8 03/09/2022    BUN 13 12/24/2014    CR 0.69 03/09/2022    CR 0.77 12/24/2014     (H) 03/09/2022    GLC 89 12/24/2014     COAGS:   Lab Results   Component Value Date    INR 0.97 05/10/2014     POC:   Lab Results   Component Value Date    HCG Negative 12/24/2014     HEPATIC:   Lab Results   Component Value Date    ALBUMIN 3.1 (L) 03/09/2022    PROTTOTAL 7.2 03/09/2022    ALT 24 03/09/2022    AST 16 03/09/2022    ALKPHOS 135 03/09/2022    BILITOTAL 0.4 03/09/2022     OTHER:   Lab Results   Component Value Date    CLIVE 9.4 03/09/2022    LIPASE 220 05/10/2014    TSH 2.02 12/24/2014    CRP 10.2 (H) 05/10/2014       Anesthesia Plan    ASA Status:  2, emergent    NPO Status:  ELEVATED Aspiration Risk/Unknown (has been drinking water)    Anesthesia Type: Spinal.              Consents    Anesthesia Plan(s) and associated risks, benefits, and realistic alternatives discussed. Questions answered and patient/representative(s) expressed understanding.     - Discussed: Risks, Benefits and Alternatives for BOTH SEDATION and the PROCEDURE were discussed     - Discussed with:  Patient      - Extended Intubation/Ventilatory Support Discussed: No.      - Patient is DNR/DNI Status: No    Use of blood products discussed: Yes.     - Discussed with: Patient.     - Consented: consented to blood products (verbal)            Reason for refusal: other.     Postoperative Care    Pain management: Multi-modal analgesia, Peripheral nerve block (Single Shot), IV analgesics (consented for TAP block).   PONV prophylaxis: Ondansetron (or other 5HT-3), Dexamethasone or Solumedrol     Comments:    Other Comments: Discussed risks and benefits with patient including itching, sore back, infection, hematoma, spinal headache, CV complications, inability to place,  nerve damage. Pt wishes to proceed.     Discussed risks and benefits of spinal anesthetic with patient including itching, sore back, infection, hematoma, spinal headache, CV complications, nerve damage, inability to place, conversion to general anesthesia, loss of airway, and death. Pt wishes to proceed.        neg OB ROS.       GREGORY Hayes CRNA

## 2022-03-09 NOTE — PROGRESS NOTES
INTRAPARTUM L&D:      Pushing for >2 hrs, + caput & molding w/o further descent -1 vtx; clinical pelvimetry w/ outlet AP diameter 9-10 cm, consistent w/ outlet obstruction/arrest of descent 2nd stage/CPD;      FHT overall reassurring, misha 1 w/ average-good variability, episodes of early decels;  Maternal VS WNL & afebrile; ctx adequate w/o oxytocin, did receive augmentation w/ max 2 mu/min but exacerbated the early decelerations;      Discussed above at length & pt agrees, wants to proceed w/ operative delivery now, consent reviewed & signed;      Pre-op meds per protocol, Mefoxin 2 gm prophylaxis, will proceed to OR;

## 2022-03-09 NOTE — H&P
"  Candis Marcos is an 26 year old female;    , EDC 3/8/2022, EGA 40 1/7 wks;      A pos, abs neg, rubella immune, GBS neg, other screening neg, 1st  wt (height 1.626 m/5' 4\", weight 71.2 kg/157 lb); uneventful pregnancy except pos COVID 2022 (symptomatic);      Admitted in early spontaneous labor, denies leak/bleed/pre-eclampsia sx; reports + fetal movement;     Past Medical History:   Diagnosis Date     Anemia of pregnancy, third trimester 12/15/2021    Iron 325 mg daily     Infection due to 2019 novel coronavirus 2022       Allergies: No Known Allergies    Active Problems:    Anemia of pregnancy, third trimester    Infection due to 2019 novel coronavirus    Encounter for triage in pregnant patient    Encounter for vaginal delivery    Blood pressure 132/78, pulse 78, temperature 97.8  F (36.6  C), temperature source Oral, resp. rate 16, SpO2 97 %, unknown if currently breastfeeding.    Review of Systems    Pain/discomfort w/ ctx, o/w neg;  Physical Exam     FHT category 1, contractions initially mildly irregular and now every 3 to 4 minutes    Vital signs normal range, afebrile  HEENT grossly normal, eyes clear  Neck supple without nodes/TM  Lungs clear  RRR without M/G, normal S1-S2  Abdomen gravid, S~D, remarkable tenderness  Normal external genitalia, cervix progressed from 2-3 to 4-5/80 to 90%/-2 vertex with intact membranes (per RN)  Lower extremities benign    Assessment:  S IUP at 40-1/7 weeks  Active spontaneous labor  History of Covid     Plan:  --- Admission, routine intrapartum protocol  --- Pediatrics notified per staff  --- Pain meds or epidural available when patient desires  Fredrick Severino MD  3/9/2022  "

## 2022-03-09 NOTE — PLAN OF CARE
Patient arrived via personal car accompanied by her , ambulated to room. Denies any bleeding or leaking of fluid lost mucus plug earlier today and reports contractions more regular last 5 hours, SVE 2 cm/80/0 station.Category 1 tracing, 130 baseline, accelerations, moderate variability.

## 2022-03-09 NOTE — ANESTHESIA CARE TRANSFER NOTE
Patient: Candis Marcos    Procedure: Procedure(s):  PRIMARY  SECTION       Diagnosis: * No pre-op diagnosis entered *  Diagnosis Additional Information: No value filed.    Anesthesia Type:   No value filed.     Note:    Oropharynx: oropharynx clear of all foreign objects and spontaneously breathing  Level of Consciousness: awake  Oxygen Supplementation: room air    Independent Airway: airway patency satisfactory and stable  Dentition: dentition unchanged  Vital Signs Stable: post-procedure vital signs reviewed and stable  Report to RN Given: handoff report given  Patient transferred to: PACU    Handoff Report: Identifed the Patient, Identified the Reponsible Provider, Reviewed the pertinent medical history, Discussed the surgical course, Reviewed Intra-OP anesthesia mangement and issues during anesthesia, Set expectations for post-procedure period and Allowed opportunity for questions and acknowledgement of understanding      Vitals:  Vitals Value Taken Time   /71 22 1450   Temp     Pulse 72 22 1451   Resp 15 22 1451   SpO2 97 % 22 1451   Vitals shown include unvalidated device data.    Electronically Signed By: Oneyda Wakefield  2022  2:52 PM

## 2022-03-09 NOTE — TELEPHONE ENCOUNTER
"OB Triage Call      Is patient's OB/Midwife with the [facility] or [facility]? [facility]- Proceed with triage     Reason for call: labor     Assessment:   The labor started 5 hours ago.  Duration The 1min-1min and 30 sec  The 10 to 5 min a part.  Regular for 5 hours.  The severity is increased.  GIORGIO today.  Pt is her first pregnancy.  The baby move okay.  No leak fluid, no vaginal bleed, no fever, no face or swelling.        Plan: L & D    Patient plans to deliver at [facility]    Patient's primary OB Provider is  Ethan Mesa MD       Per protocol recommendations Patient to be evaluated in L&D. Patient's primary OB is [facility].  Labor and delivery at [facility] ([phone]) notified of patient's pending arrival.  Report given to April.    Is patient's delivering hospital on divert? No      [id number]    Estimated Date of Delivery: Mar 8, 2022        OB History    Para Term  AB Living   1 0 0 0 0 0   SAB IAB Ectopic Multiple Live Births   0 0 0 0 0      # Outcome Date GA Lbr Bar/2nd Weight Sex Delivery Anes PTL Lv   1 Current                No results found for: GBS       Dinh Shepherd RN 22 9:47 PM  [facility] Nurse Advisor    Reason for Disposition    [1] First baby (primipara) AND [2] contractions < 6 minutes apart  AND [3] present 2 hours    Additional Information    Negative: Passed out (i.e., lost consciousness, collapsed and was not responding)    Negative: Shock suspected (e.g., cold/pale/clammy skin, too weak to stand, low BP, rapid pulse)    Negative: Difficult to awaken or acting confused (e.g., disoriented, slurred speech)    Negative: [1] SEVERE abdominal pain (e.g., excruciating) AND [2] constant AND [3] present > 1 hour    Negative: Severe bleeding (e.g., continuous red blood from vagina, or large blood clots)    Negative: Umbilical cord hanging out of the vagina (shiny, white, curled appearance, \"like telephone cord\")    Negative: Uncontrollable urge to " push (i.e., feels like baby is coming out now)    Negative: Can see baby    Negative: Sounds like a life-threatening emergency to the triager    Negative: Pregnant < 37 weeks (i.e., )    Negative: [1] Uncertain delivery date AND [2] possibly pregnant < 37 weeks (i.e., )    Protocols used: PREGNANCY - LABOR-A-AH

## 2022-03-09 NOTE — OR NURSING
Transfer of patient from PACU to [facility] inpatient room.  and spouse accompany patient. The patient tolerated ice chips and denies dizziness or nausea.

## 2022-03-09 NOTE — OP NOTE
OPERATIVE NOTE:    PREOP DIAGNOSIS:  S IUP at 40 1/7 weeks  Spontaneous active labor  Arrest of descent second stage of labor  Cephalopelvic disproportion/pelvic outlet obstruction    POSTOP DIAGNOSIS;  Same  Normal pelvic anatomy    PROCEDURE:  Primary low transverse  section    SURGEON:  MACIEJ Severino MD    ASSIST:  OR scrub technician    ANESTHESIA:  Intrapartum epidural  Spinal, postoperative tap block    EBL:  800 ml    MEDICATIONS:  Mefoxin 2 g IV preop  Bicitra p.o. preop  CLAIRE intraoperative    DRAINS:  Byrd catheter with intraoperative urine output 300 cc approximately, concentrated but clear    FINDINGS:  Delivered live 8 lb 9 oz/3,895 gm  female infant with Apgar scores of 9   and 9 @ 13:51   Right occiput posterior position  Normal configuration fundal placenta with three-vessel cord  Normal fallopian tubes and ovaries  Appendix not visualized    COMPLICATIONS:  None    DESCRIPTION:  The patient was transferred to the operating room with stable vital signs spontaneous respirations, with a Byrd catheter in place and an IV running, and the FHT was category 1/reassuring prior to her transport;    When in the operating room, the epidural catheter was removed and a spinal anesthetic was administered by the anesthesiologist;    The patient was placed in a supine position with left lateral hip to displace the uterus, and an exam under anesthesia revealed no change in vertex position;    The patient's abdomen was prepped and draped in usual sterile fashion, and when adequate analgesia was verified, a Pfannenstiel incision was made and carried down to the anterior fascia by sharp dissection, and the anterior fascia was incised transversely, and was  from the underlying recti muscles by blunt and sharp dissection;    The recti muscles were spread in the midline, and the peritoneal cavity was sharply and bluntly entered and bluntly spread, and an Ascencion ring retractor was inserted into the incision  and secured;    A small transverse incision was made at the vesicouterine fold, and the upper edge of the bladder flap was  from the lower uterine segment, and a small transverse incision was made in the the lower uterine segment until bulging membranes were visualized, followed by clear amniotic fluid;    The incision was spread transversely and cephalad, and the operators hand was placed into the lower uterine segment beneath the fetal vertex and the vertex carefully delivered through the incision, and complete delivery was accomplished at 13:51, a live   8 lb 9 oz/3,895 gm female infant with Apgar scores of 9 and 9;    The infant was dried and stimulated on the operative field for approximately 30 seconds and good cries respirations were heard, and the umbilical cord was clamped and cut and the infant was handed to the pediatrician and staff in attendance;    Cord blood samples were obtained from the remaining stump of cord, intravenous oxytocin was administered per protocol, and the placenta was delivered through the incision spontaneously, intact with normal configuration and three-vessel cord;    The uterine cavity was free of all membranes, and the incision edges were secured and closed with an interlocking 1 chromic suture, followed by an imbricating second layer of 1 chromic suture run in a Lembert configuration, and the uterus was well contracted;    Hemostasis looked adequate/fairly complete and the pelvis was irrigated with sterile saline with all fluid and blood evacuated, and the ring retractor was removed;    Posterior fascial and peritoneal layers were closed using a continuous 1 chromic suture, incorporating the medial edges of the recti muscles;    The fascial layer was reapproximated with a continuous 1 Vicryl suture, and the subcutaneous tissue was reapproximated with a continuous 3-0 catgut suture;    The skin was closed with a 3-0 subcuticular Monocryl suture followed by tissue glue  and then an occlusive dressing;    All counts were correct, Crede's maneuver was performed yielding a small amount of blood from the vagina, and the uterus was well contracted;    A TAP block was administered by anesthesia and the patient was transferred from the operating room with stable vital signs and spontaneous respirations, with the Byrd catheter left in place and an abdominal binder secured prior to her transport;    The placenta was submitted to pathology for routine examination, and umbilical cord arterial blood gases were obtained shortly after the infant's delivery, from a section of delivered umbilical cord;

## 2022-03-09 NOTE — PLAN OF CARE
Goal Outcome Evaluation:    Plan of Care Reviewed With: patient, spouse     Overall Patient Progress: improving  Patient came in spontaneous labor with spouse, progressed nicely and now has an epidural and is comfortable, last sterile vag exam she is complete with bulging bag, no active bleeding. Significant other and mother at bedside. Prior to epidural patient ambulated in room and tub soaked she was able to get some relief.  One dose of zofran given for nausea and essentia oil peppermint given.

## 2022-03-10 LAB
BACTERIA UR CULT: NORMAL
BASOPHILS # BLD AUTO: 0 10E3/UL (ref 0–0.2)
BASOPHILS NFR BLD AUTO: 0 %
EOSINOPHIL # BLD AUTO: 0 10E3/UL (ref 0–0.7)
EOSINOPHIL NFR BLD AUTO: 0 %
ERYTHROCYTE [DISTWIDTH] IN BLOOD BY AUTOMATED COUNT: 15 % (ref 10–15)
HCT VFR BLD AUTO: 32.6 % (ref 35–47)
HGB BLD-MCNC: 10.1 G/DL (ref 11.7–15.7)
IMM GRANULOCYTES # BLD: 0.1 10E3/UL
IMM GRANULOCYTES NFR BLD: 1 %
LYMPHOCYTES # BLD AUTO: 1.8 10E3/UL (ref 0.8–5.3)
LYMPHOCYTES NFR BLD AUTO: 9 %
MCH RBC QN AUTO: 27 PG (ref 26.5–33)
MCHC RBC AUTO-ENTMCNC: 31 G/DL (ref 31.5–36.5)
MCV RBC AUTO: 87 FL (ref 78–100)
MONOCYTES # BLD AUTO: 1.2 10E3/UL (ref 0–1.3)
MONOCYTES NFR BLD AUTO: 6 %
NEUTROPHILS # BLD AUTO: 17 10E3/UL (ref 1.6–8.3)
NEUTROPHILS NFR BLD AUTO: 84 %
NRBC # BLD AUTO: 0 10E3/UL
NRBC BLD AUTO-RTO: 0 /100
PLATELET # BLD AUTO: 194 10E3/UL (ref 150–450)
RBC # BLD AUTO: 3.74 10E6/UL (ref 3.8–5.2)
T PALLIDUM AB SER QL: NONREACTIVE
WBC # BLD AUTO: 20.1 10E3/UL (ref 4–11)

## 2022-03-10 PROCEDURE — 250N000011 HC RX IP 250 OP 636: Performed by: OBSTETRICS & GYNECOLOGY

## 2022-03-10 PROCEDURE — 120N000001 HC R&B MED SURG/OB

## 2022-03-10 PROCEDURE — 250N000013 HC RX MED GY IP 250 OP 250 PS 637: Performed by: OBSTETRICS & GYNECOLOGY

## 2022-03-10 PROCEDURE — 36415 COLL VENOUS BLD VENIPUNCTURE: CPT | Performed by: OBSTETRICS & GYNECOLOGY

## 2022-03-10 PROCEDURE — 85025 COMPLETE CBC W/AUTO DIFF WBC: CPT | Performed by: OBSTETRICS & GYNECOLOGY

## 2022-03-10 RX ORDER — OXYCODONE AND ACETAMINOPHEN 5; 325 MG/1; MG/1
1 TABLET ORAL EVERY 4 HOURS PRN
Status: DISCONTINUED | OUTPATIENT
Start: 2022-03-10 | End: 2022-03-11

## 2022-03-10 RX ORDER — IBUPROFEN 200 MG
400 TABLET ORAL EVERY 6 HOURS
Status: DISCONTINUED | OUTPATIENT
Start: 2022-03-10 | End: 2022-03-12 | Stop reason: HOSPADM

## 2022-03-10 RX ADMIN — ACETAMINOPHEN 975 MG: 325 TABLET, FILM COATED ORAL at 18:59

## 2022-03-10 RX ADMIN — SENNOSIDES AND DOCUSATE SODIUM 1 TABLET: 50; 8.6 TABLET ORAL at 10:33

## 2022-03-10 RX ADMIN — IBUPROFEN 400 MG: 200 TABLET, FILM COATED ORAL at 16:30

## 2022-03-10 RX ADMIN — IBUPROFEN 400 MG: 200 TABLET, FILM COATED ORAL at 22:23

## 2022-03-10 RX ADMIN — OXYCODONE HYDROCHLORIDE AND ACETAMINOPHEN 1 TABLET: 5; 325 TABLET ORAL at 12:40

## 2022-03-10 RX ADMIN — KETOROLAC TROMETHAMINE 30 MG: 30 INJECTION, SOLUTION INTRAMUSCULAR at 04:06

## 2022-03-10 RX ADMIN — OXYCODONE HYDROCHLORIDE 5 MG: 5 TABLET ORAL at 05:24

## 2022-03-10 RX ADMIN — ACETAMINOPHEN 975 MG: 325 TABLET, FILM COATED ORAL at 10:33

## 2022-03-10 RX ADMIN — SENNOSIDES AND DOCUSATE SODIUM 2 TABLET: 50; 8.6 TABLET ORAL at 22:23

## 2022-03-10 RX ADMIN — ACETAMINOPHEN 975 MG: 325 TABLET, FILM COATED ORAL at 05:24

## 2022-03-10 RX ADMIN — IBUPROFEN 600 MG: 600 TABLET ORAL at 10:33

## 2022-03-10 NOTE — PLAN OF CARE
Goal Outcome Evaluation:    Plan of Care Reviewed With: patient, spouse     Patient delivered a viable baby girl at 1351 via  section.

## 2022-03-10 NOTE — CARE PLAN
Face to face report given with opportunity to observe patient.  Report given to Vilma HOPE RN.    Jannie Greenwood RN  3/9/2022, 7:21 PM

## 2022-03-10 NOTE — PROGRESS NOTES
PPD #1/POD #1:      S:  Breast-feeding well;  Some pain complaints, medications ordered; ambulating, p.o. well, voiding;  Denies headache/shortness of breath/night sweats/heavy bleeding/other symptom complaints;    O:  VS normal range, afebrile  Appropriate affect, A&O, mild discomfort  Eyes clear  Neck supple  Lungs clear  RRR without M/G, normal S1-S2  Abdomen without distention, soft with normal tenderness, fundus firm, incision clean with dressing  Extremities benign    LAB: 20.1 10.1/32.6 87 194, normal differential    A/P:  Primary low transverse  section, second stage labor  Breast-feeding maternity  Anemia, NC-NC  --- Routine postpartum and postoperative care, advance activity as tolerated  --- Breast-feeding help as needed

## 2022-03-10 NOTE — PLAN OF CARE
Assessments as charted. B/P: 104/56, T: 98.2, P: 80, R: 16. Rates pain: 4/10 Patient denies need for pain medications. Incision: Healing well, well approximated, without signs of infection, and no drainage. Voiding without difficulty. Fundus Firm U/2. Lochia: Light. Activity:  Patient up ind in room. . Infant feeding: Breast feeding going fair. Babe does not want to latch and falls asleep easily at the breast.           Postpartum breastfeeding assessment completed and education provided, see Patient Education Activity.  Items included in the education are:   proper positioning and latch  effectiveness of feeding  manual expression  handling and storing breastmilk  maintenance of breastfeeding for the first 6 months  sign/symptoms of infant feeding issues requiring referral to qualified health care provider  Postpartum care education provided, see Patient Education activity. Patient denies needs. Will monitor.  Tanya Campbell RN

## 2022-03-10 NOTE — CARE PLAN
After about 2 hours of effective pushing, Dr. Severino called an ASAP C/Section. See flow sheet and delivery record for details.

## 2022-03-10 NOTE — PLAN OF CARE
Goal Outcome Evaluation:    Plan of Care Reviewed With: patient     Overall Patient Progress: improving    Vital signs stable, incision dressing is clean/dry/intact. Firm 2 below umbilicus, light to scant flow, lizarraga discontinued and patient has ambulated to bathroom to void. Sequentials in use for VTE management. Pain controled with PO meds and Toradol. Significant other at bedside and supportive

## 2022-03-11 PROCEDURE — 120N000001 HC R&B MED SURG/OB

## 2022-03-11 PROCEDURE — 250N000013 HC RX MED GY IP 250 OP 250 PS 637: Performed by: OBSTETRICS & GYNECOLOGY

## 2022-03-11 RX ORDER — OXYCODONE AND ACETAMINOPHEN 5; 325 MG/1; MG/1
1 TABLET ORAL EVERY 6 HOURS PRN
Qty: 15 TABLET | Refills: 0 | Status: SHIPPED | OUTPATIENT
Start: 2022-03-11 | End: 2022-03-17

## 2022-03-11 RX ORDER — OXYCODONE HYDROCHLORIDE 5 MG/1
5 TABLET ORAL EVERY 4 HOURS PRN
Status: DISCONTINUED | OUTPATIENT
Start: 2022-03-11 | End: 2022-03-12 | Stop reason: HOSPADM

## 2022-03-11 RX ADMIN — SENNOSIDES AND DOCUSATE SODIUM 1 TABLET: 50; 8.6 TABLET ORAL at 20:16

## 2022-03-11 RX ADMIN — IBUPROFEN 400 MG: 200 TABLET, FILM COATED ORAL at 17:33

## 2022-03-11 RX ADMIN — ACETAMINOPHEN 975 MG: 325 TABLET, FILM COATED ORAL at 13:07

## 2022-03-11 RX ADMIN — SENNOSIDES AND DOCUSATE SODIUM 1 TABLET: 50; 8.6 TABLET ORAL at 09:10

## 2022-03-11 RX ADMIN — IBUPROFEN 400 MG: 200 TABLET, FILM COATED ORAL at 11:19

## 2022-03-11 RX ADMIN — ACETAMINOPHEN 975 MG: 325 TABLET, FILM COATED ORAL at 06:48

## 2022-03-11 RX ADMIN — ACETAMINOPHEN 975 MG: 325 TABLET, FILM COATED ORAL at 02:04

## 2022-03-11 RX ADMIN — IBUPROFEN 400 MG: 200 TABLET, FILM COATED ORAL at 22:51

## 2022-03-11 RX ADMIN — OXYCODONE HYDROCHLORIDE 5 MG: 5 TABLET ORAL at 16:20

## 2022-03-11 RX ADMIN — IBUPROFEN 400 MG: 200 TABLET, FILM COATED ORAL at 05:31

## 2022-03-11 RX ADMIN — ACETAMINOPHEN 975 MG: 325 TABLET, FILM COATED ORAL at 19:02

## 2022-03-11 NOTE — DISCHARGE SUMMARY
OB  BIRTH DISCHARGE SUMMARY    Name: Candis Marcos  Age: 26 year old  YOB: 1995   Medical Record #: [id number]     Date of Admission:  3/8/2022  Date of Discharge:  3/11/2022  Admitting Physician:  Fredrick Severino MD  Discharge Physician:  Fredrick Severino MD  Discharging Service:  Obstetrics and Gynecology     Primary Provider:   Tess Tsang         Admission Diagnoses:     Encounter for triage in pregnant patient [Z36.89]  Encounter for vaginal delivery [O80]          Discharge Diagnosis:     1. 26 year old  female at 40w1d, delivered.  2. POD# 3 s/p primary  section (arrest of descent second stage)  3.  Anemia, normocytic normochromic  4.  Breast-feeding maternity          Discharge Disposition:     Discharged to home.           Condition on Discharge:     Discharge condition: Stable   Discharge vitals: /81 (BP Location: Left arm, Patient Position: Semi-Man's)   Pulse 89   Temp 98.3  F (36.8  C)   Resp 16   SpO2 97%   Breastfeeding Unknown    Code status on discharge: Full Code          Brief History of Illness:     Candis Marcos is a 26 year old female, , 40 0/7 wks, who was admitted for spontaneous active labor. Please see her admit H&P for full details of her PMH, PSH, Meds, Allergies and exam on admission.          Procedure Performed:       Primary Low Segment Transverse  Section  Analgesia used: Spinal          Infant Delivery Information:     40w1d at time of delivery.  Delivery Date:   Sex: Female  Weight: 8 lb 9 oz/3,895 gm   APGAR 1 min: 9  APGAR 5 min: 9         Hospital Course:     Candis Marcos is a 26 year old female who was admitted to the hospital for the above listed indications. She does not have a history of a prior  section and presented for active spontaneous labor, but failed to progress to delivery after greater than 2 hours and second stage of labor. Please see her  Section Operative Note  for full details regarding her delivery. The patient's hospital course was unremarkable. The Byrd catheter was removed on POD# 1 and her diet was advanced. She recovered as anticipated and her postoperative course was uncomplicated. On POD# 3, she was meeting all of her postpartum goals and deemed stable for discharge. She was ambulating well, voiding without difficulty, tolerating a regular diet without nausea and vomiting, her pain was well controlled on oral pain medicines and her lochia was appropriate. No fever or significant wound drainage. She is breastfeeding independently. Infant is stable. She will be discharged home in good condition on POD# 3. Last HGB Result:   Hemoglobin (g/dL)   Date Value   03/10/2022 10.1 (L)   12/24/2014 13.5   . Her Rh status is A POS  , and Rhogam was not indicated.          Post-Partum Complications:     None.         Contraception:     The patient desires something eventually for contraception, to be discussed at Postpartum visit.         Medications Prior to Admission:     Medications Prior to Admission   Medication Sig Dispense Refill Last Dose     Cranberry 1000 MG CAPS    More than a month at Unknown time     Ferrous Sulfate (IRON) 325 (65 Fe) MG tablet Take 1 tablet by mouth daily   Past Week at Unknown time     ondansetron (ZOFRAN-ODT) 4 MG ODT tab Take 1 tablet (4 mg) by mouth every 6 hours as needed for nausea 40 tablet 1 More than a month at Unknown time     Prenatal Vit-Fe Fumarate-FA (PRENATAL VITAMINS PO)    3/8/2022 at 0900             Discharge Medications:     Current Discharge Medication List      CONTINUE these medications which have NOT CHANGED    Details   Cranberry 1000 MG CAPS       Ferrous Sulfate (IRON) 325 (65 Fe) MG tablet Take 1 tablet by mouth daily      ondansetron (ZOFRAN-ODT) 4 MG ODT tab Take 1 tablet (4 mg) by mouth every 6 hours as needed for nausea  Qty: 40 tablet, Refills: 1    Associated Diagnoses: Pregnancy related nausea and vomiting,  antepartum      Prenatal Vit-Fe Fumarate-FA (PRENATAL VITAMINS PO)                    Consultations:     No consultations were requested during this admission          Significant Results:     None.             Pending Results:     Pathology placenta           Discharge Instructions and Follow-Up:     Discharge Diet: Regular   Discharge Activity: Increase activity as tolerated. No vigorous activity or exercise for 6 weeks.    No swimming or bath for 7-10 days.    No driving or operating machinery for 2 weeks or while on narcotics.    Lifting restrictions up to 15 pounds for 6 weeks.    Pelvic rest for 6 weeks which includes intercourse, douching and tampons.   Discharge Nursing: Nursing is encouraged.    Schedule outpatient lactation follow up in 2-4 days.   Discharge Instructions: The warning signs of postpartum depression have been reviewed, and the patient is aware that this can be a common occurrence. She is encouraged to seek immediate medical evaluation and assistance for any questions or concerns.    Instructions on avoiding constipation and straining are discussed. The patient is advised in the appropriate use of stool softeners to avoid constipation.    The patient will call the Ob/Gyn Clinic Nurse at [phone] for problems such as fever (temperature >100.4), chills, pain not controlled by usual oral pain medications, persistent nausea and vomiting, constipation, difficulty nursing, heavy odorous vaginal discharge, burning or pain associated with urination.  Call for excessive vaginal bleeding, saturating more than one pad an hour for more than three consecutive hours.  Call for any problems with the incision, drainage or redness from incision site or increasing pain.   Discharge Wound care: Routine incision care is discussed with the patient.     The patient is instructed to keep her incisions clean and dry by running soapy water over them and dabbing them dry. It is best to shower for the first week  while the healing process is underway; after 7-10 days it is safe to take a bath.     The incision was closed with Steri-Strips which may fall off on their own. If they do not, then they may be removed after 10-14 days.   Discharge Follow-up: Follow up for outpatient lactation appointment in 2-4 days if desired.    Follow up for postpartum exam and incision check in 1-2 weeks with Dr. Mesa.    Follow up for postpartum exam in 6 weeks with Dr. Mesa.    Follow up with Primary Care Provider as scheduled for management of active medical problems and for adult preventive services.    Call OB/GYN Clinic at [phone] as necessary if you have problems in the interim.     Total time spent for discharge on date of discharge: 20 minutes  I saw the patient on the date of discharge.    Fredrick Severino MD   Obstetrics and Gynecology

## 2022-03-11 NOTE — PROGRESS NOTES
Fredrick Severino MD   Physician   Obstetrics   Progress Notes      Signed   Date of Service:  3/10/2022 10:32 AM   Creation Time:  3/10/2022 10:32 AM              Humble for details  PPD #2/POD #2:        S:  Breast-feeding well;  Pain complaints better, ambulating, PO well, voiding;  Denies headache/shortness of breath/night sweats/heavy bleeding/other symptom complaints;     O:  VS normal range, afebrile  Appropriate affect, A&O, mild discomfort  Eyes clear  Neck supple  Lungs clear  RRR without M/G, normal S1-S2  Abdomen without distention, soft with normal tenderness, fundus firm, incision clean with intact dressing, dry  Extremities benign     LAB: (3/10/22)  20.1 10.1/32.6 87 194, normal differential     A/P:  Primary low transverse  section, second stage labor  Breast-feeding maternity  Anemia, NC-NC  --- Continue routine postpartum and postoperative care  --- Breast-feeding support  --- Planned discharge tomorrow

## 2022-03-11 NOTE — PLAN OF CARE
Assessments as charted. B/P: 104/56, T: 98.2, P: 80, R: 16. Rates pain: 4/10 Patient reports incisional pain. Incision: Healing well, well approximated, without signs of infection, and no drainage. Voiding without difficulty. Fundus firm U/2. Lochia: Light. Activity: normal activity. Infant feeding: Breast feeding going poorly.           Postpartum breastfeeding assessment completed and education provided, see Patient Education Activity.  Items included in the education are:   proper positioning and latch  effectiveness of feeding  manual expression  handling and storing breastmilk  maintenance of breastfeeding for the first 6 months  sign/symptoms of infant feeding issues requiring referral to qualified health care provider  Postpartum care education provided, see Patient Education activity. Patient denies needs. Will monitor.  Tanya Campbell RN

## 2022-03-11 NOTE — PLAN OF CARE
Goal Outcome Evaluation:      Patient goals set for the shift to rest, ambulate, and work with nursing on breastfeeding were all met this shift.  Pain has been controled with motrin and tylenol.  Breasts are slightly tender using nipple shield, and lanolin cream and works well with baby for proper latch.  Spouse at bedside and supportive.  Vital signs stable. Fundus is 2 below umbilicus firm and light flow, voiding without difficulty. Patient is independent with cares and in pleasant spirits. She plans to discharge either today or tomorrow.

## 2022-03-11 NOTE — PLAN OF CARE
[facility]  Critical Care H&P       Patient: Nicole Draper Date of Service: 8/26/2021   MRN: [id number] Time: [unfilled]   YOB: 1942  Length of Stay: 0 days     HPI: Nicole Draper is a 78yo female with PMH normal pressure hydrocephalus s/p craniotomy &  shunt, depression, aortic stenosis, HTN, HLD, angina pectoris, diastolic dysfunction, pericardial effusion s/p pericardiocentesis December 2020, hypothyroidism s/p partial thyroidectomy, DM, diverticulosis, ventral hernia s/p repair . For the past several months she has been residing in [facility]. At baseline she is alert & oriented X 3, ambulates with walker. Today she was more lethargic and febrile thus brought to the ED for evaluation. Patient was seen and evaluated in the ED.  Information obtained from daughter Xenia Draper and review of the EMR. Her daughter saw her last Sunday and she appeared to be at her baseline. She had diarrhea on Tuesday and was to be tested for CDiff but diarrhea stopped prior to specimen collection done and the order was cancelled.  ED work up included CT head with small hyper density adjacent, medial to the temporal horn of the right lateral ventricle, presence of  shunt. CXR with cardiomegaly, mild interstitial edema. WBC elev 17, , AG 27, CO2 10, elevated lactate 6.0. UA benign. Blood cultures collected in ED.   Patient is afebrile. NSR on telemetry. /61. RR 20.  She does not open eyes spontaneously, moving ext X 4 spontaneously, grimaces to pain. Mucous membranes dry. Insulin drip infusing.     Past Medical History  No past medical history on file. Past Surgical History  No past surgical history on file.   Social History:  Social History     Tobacco Use   • Smoking status: Not on file   Substance Use Topics   • Alcohol use: Not on file   • Drug use: Not on file    Family History:  No family history on file.     Allergies:  ALLERGIES:  Not on File Immunizations:  UTD per  Goal Outcome Evaluation:    Plan of Care Reviewed With: patient, spouse     Overall Patient Progress: improving  Assessments as charted. B/P: 128/81, T: 98.4, P: 89, R: 16. Rates pain: 1/10 pt reports adequate pain control at this time. Incision: dressing c/d/i. Voiding without difficulty. Fundus at U-1. Lochia: Light. Activity: unrestricted with out pain. Infant feeding: breastfeeding using the nipple shield, pt stated feedings are going well.  No feeding observed yet this shift, pt fed just prior to start of shift.  Discussed feeding on demand.          Postpartum breastfeeding assessment completed and education provided, see Patient Education Activity.  Items included in the education are:   proper positioning and latch  effectiveness of feeding  manual expression  handling and storing breastmilk  maintenance of breastfeeding for the first 6 months  sign/symptoms of infant feeding issues requiring referral to qualified health care provider  Postpartum care education provided, see Patient Education activity. Patient denies needs. Will monitor.  Franchesca Jules RN           patient/family     Review of Systems:  Constitutional: Negative except as documented in history of present illness.  Eye: Negative except as documented in history of present illness.  Ear/Nose/Mouth/Throat: Negative except as documented in history of present illness.  Cardiovascular: Negative except as documented in history of present illness.  Respiratory: Negative except as documented in history of present illness.  Gastrointestinal: Negative except as documented in history of present illness.  Genitourinary: Negative except as documented in history of present illness.  Musculoskeletal: Negative except as documented in history of present illness.  Integumentary: Negative except as documented in history of present illness.  Hematology/Lymphatics: Negative except as documented in history of present illness.  Neurologic: Negative except as documented in history of present illness.  Endocrine: Negative except as documented in history of present illness.  Allergy/Immunologic: Negative except as documented in history of present illness.  Psychiatric: Negative except as documented in history of present illness.     Medications:  Current Facility-Administered Medications   Medication   • acetaminophen (TYLENOL) suppository 325 mg   • acetaminophen (TYLENOL) suppository 650 mg   • insulin regular (human) (HumuLIN R) 100 units in sodium chloride 0.9% 100 mL infusion   • dextrose 50 % injection 25 g   • dextrose 50 % injection 12.5 g   • glucagon (GLUCAGEN) injection 1 mg   • dextrose (GLUTOSE) 40 % gel 15 g   • dextrose (GLUTOSE) 40 % gel 30 g   • lactated ringers infusion   • sodium chloride 0.9 % flush bag 25 mL   • Potassium Standard Replacement Protocol   • Magnesium Standard Replacement Protocol   • Phosphorus Standard Replacement Protocol     No current outpatient medications on file.       Vitals:  Visit Vitals  /61   Pulse 87   Temp 99.9 °F (37.7 °C) (Rectal)   Resp 16   Wt 64.7 kg (142 lb 10.2 oz)   SpO2 97%        Physical Exam:  General Appearance:     cooperative, no distress, appears stated age   Head:    Healed craniotomy incision. Normocephalic, without obvious abnormality, atraumatic   Eyes:    Pupils equal, round, reactive to light, conjunctivae/corneas clear, extraocular movements intact,  both eyes   Ears:    Normal external ear canals, both ears   Throat:   Lips, mucosa, and tongue dry, pale   Neck:   Supple, symmetrical, trachea midline, no carotid    bruit or jugular venous distention. Healed neck incision.    Back:     Symmetric, no costovertebral angle tenderness   Lungs:     Clear to auscultation bilaterally, respirations unlabored   Chest Wall:    No tenderness or deformity    Heart:    Regular rate and rhythm, S1 and S2 normal, aortic murmur   Abd:  Soft nontender nondistended BS hypoactive    :  santamaria draining clear urine    Extremities:   Extremities normal, atraumatic, no cyanosis or edema. No obvious deformities    Pulses:   2+ and symmetric all extremities   Skin:   Skin color, texture, turgor normal, no rashes or lesions   Neurologic:   Cranial nerves II-XII intact, normal strength, sensation and reflexes throughout GCS 15      Labs  Admission on 08/26/2021   Component Date Value Ref Range Status   • Sodium 08/26/2021 140  135 - 145 mmol/L Final   • Potassium 08/26/2021 4.3  3.4 - 5.1 mmol/L Final   • Chloride 08/26/2021 107  98 - 107 mmol/L Final   • Carbon Dioxide 08/26/2021 10* 21 - 32 mmol/L Final   • Anion Gap 08/26/2021 27* 10 - 20 mmol/L Final   • Glucose 08/26/2021 559* 65 - 99 mg/dL Final    ;[id number] CALLED CRITICAL RESULTS ON 08/26/2021 @ 1052 TO AND READ BACK BY BUZZ BUCHANAN in ER   • BUN 08/26/2021 23* 6 - 20 mg/dL Final   • Creatinine 08/26/2021 0.98* 0.51 - 0.95 mg/dL Final   • Glomerular Filtration Rate 08/26/2021 55* >=60 Final    eGFR 30-59 mL/min/1.73m2 = Moderate decrease in kidney function. Stage 3 CKD (chronic kidney disease) or moderate kidney disease. Estimated GFR  calculated using the 2009 CKD-EPI creatinine equation.   • BUN/ Creatinine Ratio 08/26/2021 23  7 - 25 Final   • Calcium 08/26/2021 9.0  8.4 - 10.2 mg/dL Final   • Bilirubin, Total 08/26/2021 0.7  0.2 - 1.0 mg/dL Final   • GOT/AST 08/26/2021 7  <=37 Units/L Final   • GPT/ALT 08/26/2021 17  <64 Units/L Final   • Alkaline Phosphatase 08/26/2021 145* 45 - 117 Units/L Final   • Albumin 08/26/2021 3.1* 3.6 - 5.1 g/dL Final   • Protein, Total 08/26/2021 7.8  6.4 - 8.2 g/dL Final   • Globulin 08/26/2021 4.7* 2.0 - 4.0 g/dL Final   • A/G Ratio 08/26/2021 0.7* 1.0 - 2.4 Final   • Troponin I, Ultra Sensitive 08/26/2021 <0.02  <=0.04 ng/mL Final   • Ventricular Rate EKG/Min (BPM) 08/26/2021 115   Final   • Atrial Rate (BPM) 08/26/2021 115   Final   • MA-Interval (MSEC) 08/26/2021 142   Final   • QRS-Interval (MSEC) 08/26/2021 68   Final   • QT-Interval (MSEC) 08/26/2021 290   Final   • QTc 08/26/2021 401   Final   • P Axis (Degrees) 08/26/2021 52   Final   • R Axis (Degrees) 08/26/2021 57   Final   • T Axis (Degrees) 08/26/2021 85   Final   • REPORT TEXT 08/26/2021    Final                    Value:Sinus tachycardia  Nonspecific ST-T wave abnormality  Abnormal ECG  No previous ECGs available  Confirmed by SUREKHA WATTS MD ([id number]) on 8/26/2021 2:23:49 PM     • WBC 08/26/2021 17.9* 4.2 - 11.0 K/mcL Final   • RBC 08/26/2021 4.56  4.00 - 5.20 mil/mcL Final   • HGB 08/26/2021 11.4* 12.0 - 15.5 g/dL Final   • HCT 08/26/2021 38.6  36.0 - 46.5 % Final   • MCV 08/26/2021 84.6  78.0 - 100.0 fl Final   • MCH 08/26/2021 25.0* 26.0 - 34.0 pg Final   • MCHC 08/26/2021 29.5* 32.0 - 36.5 g/dL Final   • RDW-CV 08/26/2021 18.8* 11.0 - 15.0 % Final   • RDW-SD 08/26/2021 57.4* 39.0 - 50.0 fL Final   • PLT 08/26/2021 409  140 - 450 K/mcL Final   • NRBC 08/26/2021 0  <=0 /100 WBC Final   • Neutrophil, Percent 08/26/2021 93  % Final   • Lymphocytes, Percent 08/26/2021 2  % Final   • Mono, Percent 08/26/2021 4  % Final   • Eosinophils, Percent  08/26/2021 0  % Final   • Basophils, Percent 08/26/2021 0  % Final   • Immature Granulocytes 08/26/2021 1  % Final   • Absolute Neutrophils 08/26/2021 16.6* 1.8 - 7.7 K/mcL Final   • Absolute Lymphocytes 08/26/2021 0.4* 1.0 - 4.0 K/mcL Final   • Absolute Monocytes 08/26/2021 0.6  0.3 - 0.9 K/mcL Final   • Absolute Eosinophils  08/26/2021 0.0  0.0 - 0.5 K/mcL Final   • Absolute Basophils 08/26/2021 0.0  0.0 - 0.3 K/mcL Final   • Absolute Immmature Granulocytes 08/26/2021 0.1  0.0 - 0.2 K/mcL Final   • COLOR, URINALYSIS 08/26/2021 Yellow   Final   • APPEARANCE, URINALYSIS 08/26/2021 Clear   Final   • GLUCOSE, URINALYSIS 08/26/2021 >=500* Negative mg/dL Final   • BILIRUBIN, URINALYSIS 08/26/2021 Negative  Negative Final   • KETONES, URINALYSIS 08/26/2021 80 * Negative mg/dL Final   • SPECIFIC GRAVITY, URINALYSIS 08/26/2021 1.022  1.005 - 1.030 Final   • OCCULT BLOOD, URINALYSIS 08/26/2021 Negative  Negative Final   • PH, URINALYSIS 08/26/2021 5.0  5.0 - 7.0 Final   • PROTEIN, URINALYSIS 08/26/2021 Negative  Negative mg/dL Final   • UROBILINOGEN, URINALYSIS 08/26/2021 0.2  0.2, 1.0 mg/dL Final   • NITRITE, URINALYSIS 08/26/2021 Negative  Negative Final   • LEUKOCYTE ESTERASE, URINALYSIS 08/26/2021 Negative  Negative Final   • SQUAMOUS EPITHELIAL, URINALYSIS 08/26/2021 1 to 5  None Seen, 1 to 5 /hpf Final   • ERYTHROCYTES, URINALYSIS 08/26/2021 1 to 2  None Seen, 1 to 2 /hpf Final   • LEUKOCYTES, URINALYSIS 08/26/2021 1 to 5  None Seen, 1 to 5 /hpf Final   • BACTERIA, URINALYSIS 08/26/2021 None Seen  None Seen /hpf Final   • HYALINE CASTS, URINALYSIS 08/26/2021 6 to 10* None Seen, 1 to 5 /lpf Final   • MUCUS 08/26/2021 Present   Final   • LACTIC ACID, VENOUS - RESPIRATORY 08/26/2021 6.6* <2.0 mmol/L Final   • Lactate, Venous 08/26/2021 6.0* 0.0 - 2.0 mmol/L Final    ;[id number] CALLED CRITICAL RESULTS ON 08/26/2021 @ 1427 TO AND READ BACK BY KENDAL CARDENAS in ER05   • Beta Hydroxybutyrate 08/26/2021 3.5* 0.0 - 0.3 mmol/L Final     Diabetic Ketoacidosis >2.0 mmol/L   • Rapid SARS-COV-2 by PCR 08/26/2021 Not Detected  Not Detected / Detected / Presumptive Positive / Inhibitors present Final   • Isolation Guidelines 08/26/2021    Final    Do not use this test result as the sole decision-maker for discontinuation of isolation.   Clinical evaluation should be considered for other respiratory illness requiring transmission-based isolation.    -    No fever (<99.0 F/37.2 C) for at least 24 hours without the use of fever-reducing medications    AND  -    Respiratory symptoms have improved or resolved (e.g. cough, shortness of breath)     AND  -    COVID-19 negative test    See COVID-19 Deisolation Resource Guide   • Procedural Comment 08/26/2021    Final    SARS-CoV-2 nucleic acid has not been detected indicating the absence of COVID-19.       Testing was performed using the [employer] Xpert Xpress SARS-CoV-2 RT-PCR assay that has been given Emergency Use Authorization (EUA) by the [place] Food and Drug Administration (FDA).  These results are considered definitive and do not need to be confirmed by another method.   • GLUCOSE, BEDSIDE - POINT OF CARE 08/26/2021 464* 70 - 99 mg/dL Final   • GLUCOSE, BEDSIDE - POINT OF CARE 08/26/2021 311* 70 - 99 mg/dL Final    Notified RN   • GLUCOSE, BEDSIDE - POINT OF CARE 08/26/2021 248* 70 - 99 mg/dL Final       Imaging:  CT HEAD WO CONTRAST   Final Result      Small hyperdensity adjacent, medial to the temporal horn of the right   lateral ventricle. Artifactual versus small hemorrhage. Follow-up CT should   be obtained.       Ventricular shunt, prominent size of the ventricular system. No priors for   comparison to determine baseline size of the ventricles.       Craniotomy defect bilateral frontal region, there is mixed density   extra-axial subdural collections bilaterally. These are probably post   operative. Follow-up CT should be obtained for stability. No priors for   comparison..             Electronically Signed by: CATHI URBINA M.D.    Signed on: 8/26/2021 12:24 PM          XR CHEST PA OR AP 1 VIEW   Final Result      1.  Cardiomegaly with mild interstitial edema.   2.  Nonspecific fullness in the right hilar/perihilar region which may be   vascular with other etiologies not excluded.   3.  No definite focal airspace consolidation.   4.  Additional findings as described above.      Electronically Signed by: ALMAZ BAEZA M.D.    Signed on: 8/26/2021 11:37 AM               Assessment/Plan:  79 year old female presents with decreased level of consciousness, DKA.     Encephalopathy  DKA  Elevated lactate  Metabolic acidosis   History NPH s/p crani,  shunt  History Pericardial effusion s/p pericardiocentesis 12/2020  History HTN  History HLD\History diastolic dysfunction   History aortic stenosis  History obstructive lung disease, ex smoker   History hypothyroidism  History depression       Neuro: decr LOC, lethargy. CT head reviewed. Neurology consulted. EEG, CT perfusion, MRI  pending. Neurosurgery consulted, eval  shunt. Check CSF for bacteria, gram stain.Vanco, ceftriaxone, acyclovir for CNS coverage. Keppra . Avoid benzos, sedating medications.    Pulm: CXR reviewed, mild interstitial edema. Home meds include spiriva & symbicort. Will resume when med rec completed. Titrate O2 to maintain SpO2 > 92%    Cardiac: NSR on telemetry, hemodynamically stable, troponin negative. . Echo pending, had pericardiocentesis for pericardial effusion last December. Review home meds when med rec completed, resume as appropriate     GI: NPO d/t mental status. Had diarrhea at NH 2 days ago, resolved prior to testing for CDIff. Monitor BM    Renal: BUN/creat stable. BMP Mg Phos Q 4h.  Monitor UO, BMP in am     Infection: Leukocytosis, no clear signs of infection. Reportedly febrile @ NH T104 TMax 99.9 here.  UA benign. CXR with mild interstitial edema.  Received ceftriaxone in ED. Echo pending.   Blood cultures  pending. Check PCT. CBC daily. Check CSF for cultures, gram stain. Continue ceftriaxone, begin vancomycin for CNS coverage;  Acyclovir for viral coverage.     Endo: DKA, AG 27, CO2 10. Continue Insulin drip. BMP, Mg, Phos Q 4h. Check A1c. D5.45 @ 125ml/hr. Replete electrolytes per protocol.  History of hypothyroidism, resume synthroid when home dose known. Check TSH.     Lines/drains/tubes: PIV  Activity: bedrest  Prophylaxis: SCDs, lmwh, H2B  Disposition: admit to CCU   Code Status: DNR DNI  Ok for antiarrhythmics, vasopressors, DCCV   Goals of care: patient status discussed with daughter Xenia Draper ([phone]) at bedside. Patient has 2 daughters & spouse (he resides in [facility]). Daughter Mignon Richardson is  [phone]. Code status discussed, DNR DNI.     This note was scribed on behalf of Dr Makayla Phelps, [facility]  Critical Care Nurse Practitioner   [phone],

## 2022-03-11 NOTE — PLAN OF CARE
Goal Outcome Evaluation:    Plan of Care Reviewed With: patient, spouse     Overall Patient Progress: improving    Pt up ad maxine in the room.  Reported an increase in pain this evening between her scheduled pain meds.  Dr Severino called regarding pain meds, order changed from percocet to oxycodone.   Pt given prn pain med and reported relieve.   Fundus firm at U-1, light lochia.   Breastfeeding going well using the nipple shield.  Will continue to monitor pt and provide cares as needed.

## 2022-03-12 VITALS
OXYGEN SATURATION: 97 % | HEART RATE: 77 BPM | TEMPERATURE: 98.5 F | DIASTOLIC BLOOD PRESSURE: 84 MMHG | SYSTOLIC BLOOD PRESSURE: 131 MMHG | RESPIRATION RATE: 16 BRPM

## 2022-03-12 PROCEDURE — 250N000013 HC RX MED GY IP 250 OP 250 PS 637: Performed by: OBSTETRICS & GYNECOLOGY

## 2022-03-12 PROCEDURE — 722N000001 HC LABOR CARE VAGINAL DELIVERY SINGLE

## 2022-03-12 RX ADMIN — ACETAMINOPHEN 975 MG: 325 TABLET, FILM COATED ORAL at 08:22

## 2022-03-12 RX ADMIN — SENNOSIDES AND DOCUSATE SODIUM 1 TABLET: 50; 8.6 TABLET ORAL at 08:22

## 2022-03-12 RX ADMIN — IBUPROFEN 400 MG: 200 TABLET, FILM COATED ORAL at 05:31

## 2022-03-12 RX ADMIN — ACETAMINOPHEN 975 MG: 325 TABLET, FILM COATED ORAL at 02:25

## 2022-03-12 NOTE — CARE PLAN
Face to face report given with opportunity to observe patient.    Report given to MARITZA Arizmendi RN   3/12/2022  7:04 AM

## 2022-03-12 NOTE — PROGRESS NOTES
PPD/POD #3:      S:  Breast-feeding well, no symptom complaints, ready to leave;  Reviewed postpartum/postoperative/breast-feeding recommendations, etc.;  Follow-up arranged with Dr. Mesa;    O:  Vital signs normal/stable, afebrile  Cardiopulmonary exam stable  Abdomen soft, flat with normal tenderness, dressing removed and incision clean and intact with glue, and more glue applied to reinforce  Extremities benign    A/P:  Primary low transverse  section, second stage labor  Breast-feeding maternity  Anemia, NC-NC  --- Released to home today  --- Rx'd Percocet 325 #15, 1 p.o. every 4-6 hours as needed pain,  OTC ibuprofen along with/as needed;  Also recommended prenatal vitamins supplement, B complex supplement and iron supplement daily;  --- As above

## 2022-03-12 NOTE — PLAN OF CARE
Face to face report given with opportunity to observe patient.    Report given to Shaila Jules RN   3/11/2022  7:17 PM

## 2022-03-12 NOTE — DISCHARGE INSTRUCTIONS
Birth   Discharge Instructions    Activity: Go back to your normal activities, except for lifting restrictions of 10 lbs or less    Diet: You may eat a regular diet. Drink plenty of fluids.      Call your Doctor  if you have any of these symptoms:    * You soak a sanitary pad with blood within 1 hour, or you see clots larger than a  golf ball.    * Bleeding that lasts more than 6 weeks.     *Bad-smelling fluid that comes out of your vagina.    *A fever above 100.4 degrees Fahrenheit (38.4 degrees Celsius) with or without chills.    * Severe pain, cramping, or tenderness in your lower belly area.    * Increased pain, swelling, redness or fluid around your stitches.    * A need to urinate more frequently (use the toilet more often), more urgently (use the toilet very quickly), or it burns when you urinate.    * Redness, swelling, or pain around a vein in your leg.    * Problems coping with sadness, anxiety, or depression.    * Problems breastfeeding, or a red or painful area on your breast.    * You have questions or concerns after you return home.      [facility]: [phone]

## 2022-03-12 NOTE — PLAN OF CARE
Patient discharged at 11:45 AM via ambulation accompanied by spouse and staff. Prescriptions sent to patients preferred pharmacy. All belongings sent with patient.     Discharge instructions reviewed with patient. Listed belongings gathered and returned to patient.     Patient discharged to home.     Surgical Patient   Surgical Procedures during stay:  Section  Did patient receive discharge instruction on wound care and recognition of infection symptoms? Yes    MISC  Follow up appointment made:  Yes  Home and hospital aquired medications returned to patient: N/A  Patient reports pain was well managed at discharge: Yes

## 2022-03-12 NOTE — PLAN OF CARE
Goal Outcome Evaluation:  Assessments as charted. B/P: 131/84, T: 98.5, P: 77, R: 16. Rates pain: 1/10 incisional discomfort.  Tylenol and Ibuprofen effective in pain control. Incision: Healing well. Voiding without difficulty. Fundus Firm @ 1 below umbilicux. Lochia: Light. Activity: unrestricted with out pain. Infant feeding: Breast feeding going better with shield.  Baby gained weight in the last 24 hours.           Postpartum breastfeeding assessment completed and education provided, see Patient Education Activity.  Items included in the education are:   proper positioning and latch  effectiveness of feeding  manual expression  handling and storing breastmilk  maintenance of breastfeeding for the first 6 months  sign/symptoms of infant feeding issues requiring referral to qualified health care provider  Postpartum care education provided, see Patient Education activity. Patient denies needs. Will monitor.  FRANDY DIA RN

## 2022-03-12 NOTE — PLAN OF CARE
Goal Outcome Evaluation:    Plan of Care Reviewed With: patient     Overall Patient Progress: improving       Assessments as charted. B/P: 129/75, T: 98.1, P: 77, R: 18. Rates pain: 5/10 at incision site. Incision: UTV, dressing to be removed by Dr. Severino in the morning. Voiding without difficulty. Fundus firm and at the umbilicus. Lochia: scant. Activity: unrestricted with minimal pain . Infant feeding: Breast feeding going well with nipple shield.           Postpartum breastfeeding assessment completed and education provided, see Patient Education Activity.  Items included in the education are:   proper positioning and latch  effectiveness of feeding  manual expression  handling and storing breastmilk  maintenance of breastfeeding for the first 6 months  sign/symptoms of infant feeding issues requiring referral to qualified health care provider  Postpartum care education provided, see Patient Education activity. Patient denies needs. Will monitor.  Isela Archuleta RN

## 2022-03-13 RX ORDER — OXYCODONE AND ACETAMINOPHEN 5; 325 MG/1; MG/1
1 TABLET ORAL EVERY 6 HOURS PRN
Qty: 15 TABLET | Refills: 0 | Status: SHIPPED | OUTPATIENT
Start: 2022-03-13 | End: 2022-03-18

## 2022-03-14 LAB
PATH REPORT.COMMENTS IMP SPEC: NORMAL
PATH REPORT.COMMENTS IMP SPEC: NORMAL
PATH REPORT.FINAL DX SPEC: NORMAL
PATH REPORT.GROSS SPEC: NORMAL
PATH REPORT.MICROSCOPIC SPEC OTHER STN: NORMAL
PATH REPORT.RELEVANT HX SPEC: NORMAL
PHOTO IMAGE: NORMAL

## 2022-03-14 PROCEDURE — 88307 TISSUE EXAM BY PATHOLOGIST: CPT | Mod: 26

## 2022-03-17 ENCOUNTER — OFFICE VISIT (OUTPATIENT)
Dept: OBGYN | Facility: OTHER | Age: 27
End: 2022-03-17
Attending: NURSE PRACTITIONER
Payer: COMMERCIAL

## 2022-03-17 VITALS
SYSTOLIC BLOOD PRESSURE: 123 MMHG | DIASTOLIC BLOOD PRESSURE: 68 MMHG | HEIGHT: 64 IN | HEART RATE: 88 BPM | TEMPERATURE: 99.2 F | BODY MASS INDEX: 31.58 KG/M2 | OXYGEN SATURATION: 98 %

## 2022-03-17 PROCEDURE — G0463 HOSPITAL OUTPT CLINIC VISIT: HCPCS | Performed by: COUNSELOR

## 2022-03-17 PROCEDURE — G0463 HOSPITAL OUTPT CLINIC VISIT: HCPCS | Mod: 25 | Performed by: COUNSELOR

## 2022-03-17 PROCEDURE — 99207 PR NO CHARGE LOS: CPT | Performed by: NURSE PRACTITIONER

## 2022-03-17 RX ORDER — ERGOCALCIFEROL (VITAMIN D2) 10 MCG
TABLET ORAL
COMMUNITY
End: 2022-11-08

## 2022-03-17 ASSESSMENT — PAIN SCALES - GENERAL: PAINLEVEL: NO PAIN (0)

## 2022-03-17 NOTE — PROGRESS NOTES
"S: Candis Marcos is a pleasant 26 year old who presents for her postpartum visit. ; delivered via prime  on 3/9/22 for arrest of descent in second stage of labor. Viable baby girl, Lori, born at 40w1d gestation. Breastfeeding going well; using nipple shield. Declines lactation portion of visit today.    Lochia lightening up and nearly gone.     Almira completed: score of 3. No concerns with mood.    O: Pt alert, oriented, and not in acute distress.  /68 (BP Location: Right arm, Patient Position: Sitting, Cuff Size: Adult Regular)   Pulse 88   Temp 99.2  F (37.3  C) (Tympanic)   Ht 1.626 m (5' 4\")   SpO2 98%   BMI 31.58 kg/m    Abd: low transverse c/s scar: CDI; edges approximated; no s/s infection.    A/P: Discussed breastfeeding and trying to see if babe can latch without nipple shield as it has been tied to decreased milk supply over time   --Continue to follow postpartum restrictions   --F/u for 6 week postpartum visit or sooner if needed  "

## 2022-03-17 NOTE — NURSING NOTE
"Chief Complaint   Patient presents with     Post-op Visit     C/S 3/9/2022     Lactation Consult       Initial /68 (BP Location: Right arm, Patient Position: Sitting, Cuff Size: Adult Regular)   Pulse 88   Temp 99.2  F (37.3  C) (Tympanic)   Ht 1.626 m (5' 4\")   SpO2 98%   BMI 31.58 kg/m   Estimated body mass index is 31.58 kg/m  as calculated from the following:    Height as of this encounter: 1.626 m (5' 4\").    Weight as of 3/8/22: 83.5 kg (184 lb).  Medication Reconciliation: complete     Isela Cruz LPN    "

## 2022-03-17 NOTE — PATIENT INSTRUCTIONS
"BREASTFEEDING TIPS  1. Breastfeed every 2-4 hours. If your baby is sleepy - use breast compression, push on chin to \"start up\" baby, switch breasts, undress to diaper and wake before relatching.   Some babies \"cluster\" feed every 1 hour for a while- this is normal. Feed your baby whenever he/she is awake-  even if every hour for a while. This frequent feeding will help you make more milk and encourage your baby to sleep for longer stretches later in the evening or night.    - Position your baby close to you with pillows so he/she is facing you -belly to belly laying horizontally across your lap at the level of your breast and looking a bit \"upwards\" to your breast   -One hand holds the baby's neck behind the ears and the other hand holds your breast  -Baby's nose should start out pointing to your nipple before latching  - Hold your breast in a \"sandwich\" position by gently squeezing your breast in an oval shape and make sure your hands are not covering the areola  This \"nipple sandwich\" will make it easier for your breast to fit inside the baby's mouth-making latching more comfortable for you and baby and preventing sore nipples. Your baby should take a \"mouthful\" of breast!  - You may want to use hand expression to \"prime the pump\" and get a drip of milk out on your nipple to wake baby   (see website: [email or web address].[email or web address]/Breastfeeding/HandExpression.html)  - Swipe your nipple on baby's upper lip and wait for a BIG open mouth  - YOU bring baby to the breast (hold baby's neck with your fingers just below the ears) and bring baby's head to the breast--leading with the chin.  Try to avoid pushing your breast into baby's mouth- bring baby to you instead!  - Aim to get your baby's bottom lip LOW DOWN ON AREOLA (baby's upper lip just needs to \"clear\" the nipple) .   Your baby should latch onto the areola and NOT just the nipple. That way your baby gets more milk and you don't get sore nipples!      Useful web " sites:  Www.infantrisk.com  Www.aap.org  Www.ibreastfeeding.com  Www.[email or web address]

## 2022-03-18 ENCOUNTER — OFFICE VISIT (OUTPATIENT)
Dept: OBGYN | Facility: OTHER | Age: 27
End: 2022-03-18
Attending: NURSE PRACTITIONER
Payer: COMMERCIAL

## 2022-03-18 VITALS
HEIGHT: 64 IN | OXYGEN SATURATION: 97 % | HEART RATE: 125 BPM | TEMPERATURE: 99.3 F | BODY MASS INDEX: 31.58 KG/M2 | DIASTOLIC BLOOD PRESSURE: 78 MMHG | SYSTOLIC BLOOD PRESSURE: 125 MMHG

## 2022-03-18 DIAGNOSIS — N61.0 MASTITIS: Primary | ICD-10-CM

## 2022-03-18 DIAGNOSIS — O99.013 ANEMIA OF PREGNANCY, THIRD TRIMESTER: ICD-10-CM

## 2022-03-18 PROCEDURE — 99213 OFFICE O/P EST LOW 20 MIN: CPT | Mod: 24 | Performed by: NURSE PRACTITIONER

## 2022-03-18 RX ORDER — ACETAMINOPHEN 500 MG
500-1000 TABLET ORAL EVERY 6 HOURS PRN
COMMUNITY
End: 2023-07-25

## 2022-03-18 RX ORDER — CEPHALEXIN 500 MG/1
500 CAPSULE ORAL 2 TIMES DAILY
Qty: 20 CAPSULE | Refills: 0 | Status: SHIPPED | OUTPATIENT
Start: 2022-03-18 | End: 2022-03-28

## 2022-03-18 ASSESSMENT — PAIN SCALES - GENERAL: PAINLEVEL: MODERATE PAIN (4)

## 2022-03-18 NOTE — PATIENT INSTRUCTIONS
"BREASTFEEDING TIPS  1. Breastfeed every 2-4 hours. If your baby is sleepy - use breast compression, push on chin to \"start up\" baby, switch breasts, undress to diaper and wake before relatching.   Some babies \"cluster\" feed every 1 hour for a while- this is normal. Feed your baby whenever he/she is awake-  even if every hour for a while. This frequent feeding will help you make more milk and encourage your baby to sleep for longer stretches later in the evening or night.    - Position your baby close to you with pillows so he/she is facing you -belly to belly laying horizontally across your lap at the level of your breast and looking a bit \"upwards\" to your breast   -One hand holds the baby's neck behind the ears and the other hand holds your breast  -Baby's nose should start out pointing to your nipple before latching  - Hold your breast in a \"sandwich\" position by gently squeezing your breast in an oval shape and make sure your hands are not covering the areola  This \"nipple sandwich\" will make it easier for your breast to fit inside the baby's mouth-making latching more comfortable for you and baby and preventing sore nipples. Your baby should take a \"mouthful\" of breast!  - You may want to use hand expression to \"prime the pump\" and get a drip of milk out on your nipple to wake baby   (see website: [email or web address]/[email or web address]/HandExpression.html)  - Swipe your nipple on baby's upper lip and wait for a BIG open mouth  - YOU bring baby to the breast (hold baby's neck with your fingers just below the ears) and bring baby's head to the breast--leading with the chin.  Try to avoid pushing your breast into baby's mouth- bring baby to you instead!  - Aim to get your baby's bottom lip LOW DOWN ON AREOLA (baby's upper lip just needs to \"clear\" the nipple) .   Your baby should latch onto the areola and NOT just the nipple. That way your baby gets more milk and you don't get sore nipples!      Useful web " sites:  Www.infantrisk.com  Www.aap.org  Www.ibreastfeeding.com  Www.health.[email or web address].mn.us

## 2022-03-18 NOTE — PROGRESS NOTES
"Candis Marcos is a 26 year old P1 here today for bilateral breast tenderness and fever. Pt delivered girl via  on 3/9/22; currenty breastfeeding. Pt seen in clinic yesterday for postpartum check; denied breast concerns at that time. Reports fever/chills and R breast tenderness began last evening with reported temperature over 100 degrees and up to 103.6. Responding well to Tylenol.  L breast tenderness began today and fever has persisted despite Tylenol use. Pt also reporting mild body aches beginning today. Denies nipple discharge or radiating pain.     EXAM    /78 (BP Location: Right arm, Patient Position: Sitting, Cuff Size: Adult Regular)   Pulse (!) 125   Temp 99.3  F (37.4  C) (Tympanic)   Ht 1.626 m (5' 4\")   SpO2 97%   BMI 31.58 kg/m     General: pleasant without acute distress  Breast: Nipples intact bilaterally. No masses. Erythema and tenderness of lateral aspect of R breast. L breast erythema and tenderness from 4 o'clock position to 8 o'clock position.       ASSESSMENT/PLAN:    1. Mastitis    - Use cold packs for breast pain and tenderness  - Continue breastfeeding on demand every 2-3 hrs with nursing position changes   - Continue utilizing tylenol as needed for fever control   - cephALEXin (KEFLEX) 500 MG capsule; Take 1 capsule (500 mg) by mouth 2 times daily for 10 days  Dispense: 20 capsule; Refill: 0  - Should see symptom improvement within 1-2 days when taking Keflex. Return to clinic or seek care if symptoms worsen.          "

## 2022-03-18 NOTE — NURSING NOTE
"Chief Complaint   Patient presents with     Breast Problem     Right Breast       Initial /78 (BP Location: Right arm, Patient Position: Sitting, Cuff Size: Adult Regular)   Pulse (!) 125   Temp 99.3  F (37.4  C) (Tympanic)   Ht 1.626 m (5' 4\")   SpO2 97%   BMI 31.58 kg/m   Estimated body mass index is 31.58 kg/m  as calculated from the following:    Height as of this encounter: 1.626 m (5' 4\").    Weight as of 3/8/22: 83.5 kg (184 lb).  Medication Reconciliation: complete     Isela Cruz LPN    "

## 2022-04-21 ENCOUNTER — PRENATAL OFFICE VISIT (OUTPATIENT)
Dept: OBGYN | Facility: OTHER | Age: 27
End: 2022-04-21
Attending: OBSTETRICS & GYNECOLOGY
Payer: COMMERCIAL

## 2022-04-21 VITALS
BODY MASS INDEX: 25.78 KG/M2 | HEART RATE: 80 BPM | WEIGHT: 151 LBS | HEIGHT: 64 IN | DIASTOLIC BLOOD PRESSURE: 60 MMHG | TEMPERATURE: 98.5 F | OXYGEN SATURATION: 99 % | SYSTOLIC BLOOD PRESSURE: 110 MMHG

## 2022-04-21 PROCEDURE — 99207 PR POST PARTUM EXAM: CPT | Performed by: OBSTETRICS & GYNECOLOGY

## 2022-04-21 ASSESSMENT — PAIN SCALES - GENERAL: PAINLEVEL: NO PAIN (0)

## 2022-04-21 NOTE — PROGRESS NOTES
"SUBJECTIVE:  Candis Marcos is a 26 year old female P1 here for a postpartum visit.  She had a  Section   delivering a healthy baby girl  Currently no complaints and doing well.    Today's Depression Rating was 2    delivery complications:  No  breast feeding:  Yes  bladder problems:  No  bowel problems/hemorrhoids:  Yes  episiotomy/laceration/incision healed? Yes  vaginal flow:  None  [place]:  No  contraception:  abstinence  emotional adjustment:  doing well and happy      OBJECTIVE:  Blood pressure 110/60, pulse 80, temperature 98.5  F (36.9  C), height 1.626 m (5' 4\"), weight 68.5 kg (151 lb), SpO2 99 %, unknown if currently breastfeeding.   General - pleasant female in no acute distress.    Abdomen - soft, nontender, nondistended, no hepatosplenomegaly.  Incision:  Clean, dry, intact.  No erythema or drainage.    Rectovaginal - deferred.    ASSESSMENT:  normal postpartum exam.  Released from pregnancy related restrictions    PLAN:    May resume normal activities without restrictions  Pap smear Not Done today    The patient will use NFP for birth control. Full counseling was provided, and all questions answered. Compliance is strongly emphasized.  Return to clinic in one year for an annual, when due for a pap smear or PRN.    Ethan Mesa MD  "

## 2022-04-21 NOTE — NURSING NOTE
"Chief Complaint   Patient presents with     Postpartum Care     6 week pp, c/s 3/9/22, LPS 8/19/21  Girl Lori  Breast  No birth control       Initial /60 (BP Location: Left arm, Patient Position: Chair, Cuff Size: Adult Regular)   Pulse 80   Temp 98.5  F (36.9  C)   Ht 1.626 m (5' 4\")   Wt 68.5 kg (151 lb)   SpO2 99%   BMI 25.92 kg/m   Estimated body mass index is 25.92 kg/m  as calculated from the following:    Height as of this encounter: 1.626 m (5' 4\").    Weight as of this encounter: 68.5 kg (151 lb).  Medication Reconciliation: complete  SHERICE ROCHA LPN    "

## 2022-06-13 NOTE — PATIENT INSTRUCTIONS
Patient Education     Adapting to Pregnancy: Second Trimester    Keep up the healthy habits you started in your first trimester. You might be a little more tired than normal. So plan your day wisely. Look at the tips below and choose the ones that suit your lifestyle.  If you have any questions, check with your healthcare provider.  If you work  If you can, adjust your work with your employer to fit your needs. Try these tips:    If you stand for long periods, find ways to do some tasks while sitting. Also, try to stand with 1 foot resting on a low stool or ledge. Shift your weight from foot to foot often. Wear low-heeled shoes.    If you sit, keep your knees level with your hips. Rest your feet on a firm surface. Sit tall with support for your low back.    If you work long hours, ask about adjusting your schedule. Try taking shorter breaks more often.  When you travel  The second trimester may be the best time for any travel. Talk to your healthcare provider about any special plans you may need to make. Always:    Wear a seat belt. Fasten the lap part under your belly. Wear the shoulder part also.    Take breaks often during long trips by car or plane. Move around to stretch your legs.    Drink plenty of fluids on flights. The air in plane cabins is very dry.    Avoid hot climates or high altitudes if you are not used to them.    Avoid places where the food and water might make you sick.    Make sure you are up-to-date on all immunizations, including the flu vaccine. This is especially important when traveling overseas.  Taking time to relax  Find time to rest and relax at work or at home:    Take short time-outs daily. Do relaxation exercises.    Breathe deeply during stressful times.    Try not to take on too much. Plan tasks for times when you have the most energy.    Take naps when you can. Or just sit and relax.    After week 16, avoid lying on your back for more than a few minutes. Instead, lie on your  side. Switch sides often.  Continuing as lovers  Unless your healthcare provider tells you otherwise, there is no reason to stop having sex now. Blood supply increases to the pelvic area in the second trimester. Because of this, sex might be more enjoyable. Try different positions and see what s best. Also, talk to your partner about any changes in desire. Spotting may happen after sex. Be sure to let your healthcare provider know if there is heavy bleeding.  Keeping your environment safe  You can still clean house and use scented products. Just take some simple precautions:    Wear gloves when using cleaning fluids.    Open windows to let in fresh air. Use a fan if you paint.    Avoid secondhand smoke.    Don t breathe fumes from nail polish, hair spray, cleansers, or other chemicals.  [employer] last reviewed this educational content on 1/1/2018 2000-2021 [employer]. All rights reserved. This information is not intended as a substitute for professional medical care. Always follow your healthcare professional's instructions.           Patient Education     Pregnancy: Your Second Trimester Changes  Each day, you and your baby are changing and growing together. Here s a quick look at what s happening to both of you.  How you are changing  Even when you don t notice it, your body is adapting to meet the needs of your growing baby. The changes in your body might also affect your moods.  Your body  Your uterus expands as baby grows. As the weeks go by, you will feel more pressure on your bladder, stomach, and other organs. You may notice some skin color changes on your forehead, nose, or cheeks. Freckles may darken, and moles may grow. You may notice a darker line on your abdomen between your belly button and pubic bone in the midline.  Your moods  The second trimester is often easier than the first. Still, be prepared for mood swings. These are due to the increase in hormones (chemicals that affect the  way organs work) produced by your body. These mood swings are a normal part of pregnancy.  How your baby is growing          Month 4  Baby s heartbeat may be heard with a Doppler (hand-held ultrasound device) by 9 to 10 weeks. Eyebrows, eyelashes, and fingernails begin to form.  Month 5  You may feel your baby move. After a growth spurt, your baby nears 10 inches. Month 6  Baby s fingerprints have formed. Your baby weighs about 1 to 2 pounds (0.45 to 0.9 kg) and is about 12 inches long.   [employer] last reviewed this educational content on 1/1/2018 2000-2021 [employer]. All rights reserved. This information is not intended as a substitute for professional medical care. Always follow your healthcare professional's instructions.           Patient Education     Influenza (Flu) and Pregnancy  Influenza (the flu) is an infection of the respiratory tract. The tract is made up of your mouth, nose, and lungs, and the tubes between them. The flu can make a pregnant woman very ill. This is because changes that occur during pregnancy to the immune system, heart, and lungs make a pregnant woman more likely to develop flu complications. These include sinus infections and serious lung infections such as bronchitis and pneumonia. The flu can cause high fevers, which can cause birth defects and other complications in the developing fetus. In rare cases, the flu can lead to miscarriage or even death of the mother. This sheet tells you more about the flu, what to do if you get the flu, and what you can do to prevent infection.   Who is at risk for the flu?  Anyone can get the flu. But you are more likely to catch the flu if you:     Are often around young children    Work in a healthcare setting where you may be exposed to flu germs    Live or work with someone who has the flu    Haven t had the annual flu shot  How does the flu spread?  The flu is caused by a virus. The virus spreads through the air in droplets when  someone who has the flu coughs, sneezes, laughs, or talks. You can become infected when you breathe in the virus directly. You can also become infected when you touch a surface where the droplets have landed and then touch your eyes, nose, or mouth. Touching used tissues, or sharing utensils, drinking glasses, or a toothbrush with an infected person can expose you to the flu virus, too.   What are the symptoms of the flu?  Flu symptoms tend to start quickly and may last a few days to a few weeks. They include:     Fever that's usually higher than 100.4 F ( 38 C) and chills    Sore throat and headache    Dry cough    Runny nose    Tiredness and weakness    Body and muscle aches  If you are pregnant and have flu-like symptoms   Call your healthcare provider right away. Follow any instructions they give you. You may be asked to get tested to confirm that you have the flu.   Your healthcare provider may prescribe medicines called antivirals. These medicines must be taken within 2 days of when your symptoms started. In some cases, your healthcare provider may not wait for test results to come back before starting you on antivirals.   These medicines work by stopping the flu virus from reproducing in your body. This gives your body s immune system a chance to fight the virus. After taking the medicine, your symptoms may be milder and you may recover quicker than without the medicine. The medicine may also prevent serious complications such as pneumonia. Antivirals are considered safe during pregnancy, but talk with your healthcare provider if you have any concerns.   Easing flu symptoms    Drink lots of fluids such as water, juice, and warm soup to prevent dehydration. A good rule is to drink enough so that you urinate your normal amount. Feeling dizzy or lightheaded most likely means you need to drink more fluid.    Get plenty of rest.    If you aren't hungry, eat smaller meals more often during the day to make sure you  get enough calories.    If you don't have a fever, put warm compresses on your forehead or sinuses to ease congestion.    If you need medicines to ease symptoms, ask your healthcare provider which ones are safe for you to take.    Call your healthcare provider if you become short of breath.    Preventing the flu     Washing your hands often with soap and water can help keep you from catching the flu virus.       Get vaccinated. One of the best ways to prevent the flu is to get a flu shot. Pregnant women can safely get a flu shot. But pregnant women should not get the nasal spray vaccine for the flu. This is a live-virus vaccine and may be harmful to the baby.    Wash your hands often. Frequent handwashing is a proven way to prevent infection. Carry an alcohol-based hand gel that has at least 60% alcohol. Use it when you don t have access to soap and water.    Clean items you use often with disinfectant wipes. This includes phones, computer keyboards, and toys.    Stay away from crowds and children as much as possible while you are pregnant. Stay away from anyone who has the flu.  Tips for good handwashing   Handwashing is one of the best ways to prevent many common infections. Follow these steps for more effective handwashing:     Use warm water and plenty of soap. Work up a good lather.    Clean the whole hand, under your nails, between your fingers, and up the wrists.    Wash for at least 20 seconds. Don t just wipe--scrub well.    Rinse, letting the water run down your fingers, not up your wrists.    Dry your hands well. Use a paper towel to turn off the faucet and open the door.  Using alcohol-based hand gel  Alcohol-based hand gels are also a good choice for cleaning your hands. Use them when you don t have access to soap and water. Follow these steps:     Squeeze about a tablespoon of gel into the palm of one hand.    Rub your hands together briskly, cleaning the backs of your hands, the palms, between your  fingers, and up the wrists.    Rub until the gel is gone and your hands are completely dry.  [employer] last reviewed this educational content on 8/1/2020 2000-2021 [employer]. All rights reserved. This information is not intended as a substitute for professional medical care. Always follow your healthcare professional's instructions.            86

## 2022-07-12 ENCOUNTER — MEDICAL CORRESPONDENCE (OUTPATIENT)
Dept: HEALTH INFORMATION MANAGEMENT | Facility: HOSPITAL | Age: 27
End: 2022-07-12

## 2022-08-10 ENCOUNTER — IMMUNIZATION (OUTPATIENT)
Dept: FAMILY MEDICINE | Facility: OTHER | Age: 27
End: 2022-08-10
Attending: FAMILY MEDICINE
Payer: COMMERCIAL

## 2022-08-10 PROCEDURE — 91305 COVID-19,PF,PFIZER (12+ YRS): CPT

## 2022-08-10 PROCEDURE — 0051A COVID-19,PF,PFIZER (12+ YRS): CPT

## 2022-08-15 ENCOUNTER — APPOINTMENT (OUTPATIENT)
Dept: OCCUPATIONAL MEDICINE | Facility: OTHER | Age: 27
End: 2022-08-15

## 2022-08-15 PROCEDURE — 86580 TB INTRADERMAL TEST: CPT

## 2022-08-31 ENCOUNTER — IMMUNIZATION (OUTPATIENT)
Dept: FAMILY MEDICINE | Facility: OTHER | Age: 27
End: 2022-08-31
Attending: FAMILY MEDICINE
Payer: COMMERCIAL

## 2022-08-31 PROCEDURE — 0052A COVID-19,PF,PFIZER (12+ YRS): CPT

## 2022-08-31 PROCEDURE — 91305 COVID-19,PF,PFIZER (12+ YRS): CPT

## 2022-09-01 ENCOUNTER — APPOINTMENT (OUTPATIENT)
Dept: OCCUPATIONAL MEDICINE | Facility: OTHER | Age: 27
End: 2022-09-01

## 2022-09-01 PROCEDURE — 86580 TB INTRADERMAL TEST: CPT

## 2022-10-12 ENCOUNTER — ALLIED HEALTH/NURSE VISIT (OUTPATIENT)
Dept: FAMILY MEDICINE | Facility: OTHER | Age: 27
End: 2022-10-12
Attending: NURSE PRACTITIONER
Payer: COMMERCIAL

## 2022-10-12 DIAGNOSIS — Z23 ENCOUNTER FOR IMMUNIZATION: Primary | ICD-10-CM

## 2022-10-12 PROCEDURE — 90471 IMMUNIZATION ADMIN: CPT

## 2022-10-12 PROCEDURE — 90686 IIV4 VACC NO PRSV 0.5 ML IM: CPT

## 2022-10-24 ENCOUNTER — LAB (OUTPATIENT)
Dept: LAB | Facility: OTHER | Age: 27
End: 2022-10-24
Payer: COMMERCIAL

## 2022-10-24 DIAGNOSIS — Z34.90 EARLY STAGE OF PREGNANCY: Primary | ICD-10-CM

## 2022-10-24 DIAGNOSIS — Z34.90 EARLY STAGE OF PREGNANCY: ICD-10-CM

## 2022-10-24 LAB — HCG INTACT+B SERPL-ACNC: ABNORMAL MIU/ML

## 2022-10-24 PROCEDURE — 36415 COLL VENOUS BLD VENIPUNCTURE: CPT

## 2022-10-24 PROCEDURE — 84702 CHORIONIC GONADOTROPIN TEST: CPT

## 2022-11-07 LAB
ABO/RH(D): NORMAL
ANTIBODY SCREEN: NEGATIVE
SPECIMEN EXPIRATION DATE: NORMAL

## 2022-11-08 ENCOUNTER — PRENATAL OFFICE VISIT (OUTPATIENT)
Dept: OBGYN | Facility: OTHER | Age: 27
End: 2022-11-08
Attending: OBSTETRICS & GYNECOLOGY
Payer: COMMERCIAL

## 2022-11-08 VITALS
HEIGHT: 64 IN | WEIGHT: 155 LBS | DIASTOLIC BLOOD PRESSURE: 70 MMHG | OXYGEN SATURATION: 99 % | SYSTOLIC BLOOD PRESSURE: 105 MMHG | HEART RATE: 85 BPM | BODY MASS INDEX: 26.46 KG/M2

## 2022-11-08 DIAGNOSIS — Z34.91 PREGNANCY WITH UNCERTAIN DATES IN FIRST TRIMESTER: ICD-10-CM

## 2022-11-08 DIAGNOSIS — O34.219 PREVIOUS CESAREAN DELIVERY, ANTEPARTUM CONDITION OR COMPLICATION: ICD-10-CM

## 2022-11-08 DIAGNOSIS — O09.891 SHORT INTERVAL BETWEEN PREGNANCIES AFFECTING PREGNANCY IN FIRST TRIMESTER, ANTEPARTUM: ICD-10-CM

## 2022-11-08 DIAGNOSIS — Z34.81 ENCOUNTER FOR SUPERVISION OF OTHER NORMAL PREGNANCY IN FIRST TRIMESTER: Primary | ICD-10-CM

## 2022-11-08 LAB
ALBUMIN UR-MCNC: NEGATIVE MG/DL
AMPHETAMINES UR QL: NOT DETECTED
APPEARANCE UR: CLEAR
BARBITURATES UR QL SCN: NOT DETECTED
BENZODIAZ UR QL SCN: NOT DETECTED
BILIRUB UR QL STRIP: NEGATIVE
BUPRENORPHINE UR QL: NOT DETECTED
CANNABINOIDS UR QL: NOT DETECTED
COCAINE UR QL SCN: NOT DETECTED
COLOR UR AUTO: ABNORMAL
D-METHAMPHET UR QL: NOT DETECTED
ERYTHROCYTE [DISTWIDTH] IN BLOOD BY AUTOMATED COUNT: 13.1 % (ref 10–15)
GLUCOSE UR STRIP-MCNC: NEGATIVE MG/DL
HCT VFR BLD AUTO: 40.4 % (ref 35–47)
HGB BLD-MCNC: 13.2 G/DL (ref 11.7–15.7)
HGB UR QL STRIP: NEGATIVE
KETONES UR STRIP-MCNC: NEGATIVE MG/DL
LEUKOCYTE ESTERASE UR QL STRIP: ABNORMAL
MCH RBC QN AUTO: 29.5 PG (ref 26.5–33)
MCHC RBC AUTO-ENTMCNC: 32.7 G/DL (ref 31.5–36.5)
MCV RBC AUTO: 90 FL (ref 78–100)
METHADONE UR QL SCN: NOT DETECTED
MUCOUS THREADS #/AREA URNS LPF: PRESENT /LPF
NITRATE UR QL: NEGATIVE
OPIATES UR QL SCN: NOT DETECTED
OXYCODONE UR QL SCN: NOT DETECTED
PCP UR QL SCN: NOT DETECTED
PH UR STRIP: 5.5 [PH] (ref 4.7–8)
PLATELET # BLD AUTO: 343 10E3/UL (ref 150–450)
PROPOXYPH UR QL: NOT DETECTED
RBC # BLD AUTO: 4.48 10E6/UL (ref 3.8–5.2)
RBC URINE: <1 /HPF
SP GR UR STRIP: 1.02 (ref 1–1.03)
SQUAMOUS EPITHELIAL: 1 /HPF
TRICYCLICS UR QL SCN: NOT DETECTED
UROBILINOGEN UR STRIP-MCNC: NORMAL MG/DL
WBC # BLD AUTO: 12.3 10E3/UL (ref 4–11)
WBC URINE: <1 /HPF

## 2022-11-08 PROCEDURE — 81001 URINALYSIS AUTO W/SCOPE: CPT | Mod: 59 | Performed by: OBSTETRICS & GYNECOLOGY

## 2022-11-08 PROCEDURE — 86780 TREPONEMA PALLIDUM: CPT | Performed by: OBSTETRICS & GYNECOLOGY

## 2022-11-08 PROCEDURE — 76817 TRANSVAGINAL US OBSTETRIC: CPT | Performed by: OBSTETRICS & GYNECOLOGY

## 2022-11-08 PROCEDURE — 86901 BLOOD TYPING SEROLOGIC RH(D): CPT | Performed by: OBSTETRICS & GYNECOLOGY

## 2022-11-08 PROCEDURE — 36415 COLL VENOUS BLD VENIPUNCTURE: CPT | Performed by: OBSTETRICS & GYNECOLOGY

## 2022-11-08 PROCEDURE — 80306 DRUG TEST PRSMV INSTRMNT: CPT | Performed by: OBSTETRICS & GYNECOLOGY

## 2022-11-08 PROCEDURE — 99207 PR FIRST OB VISIT: CPT | Performed by: OBSTETRICS & GYNECOLOGY

## 2022-11-08 PROCEDURE — 86803 HEPATITIS C AB TEST: CPT | Performed by: OBSTETRICS & GYNECOLOGY

## 2022-11-08 PROCEDURE — 86762 RUBELLA ANTIBODY: CPT | Performed by: OBSTETRICS & GYNECOLOGY

## 2022-11-08 PROCEDURE — 86900 BLOOD TYPING SEROLOGIC ABO: CPT | Performed by: OBSTETRICS & GYNECOLOGY

## 2022-11-08 PROCEDURE — 86850 RBC ANTIBODY SCREEN: CPT | Performed by: OBSTETRICS & GYNECOLOGY

## 2022-11-08 PROCEDURE — 87389 HIV-1 AG W/HIV-1&-2 AB AG IA: CPT | Performed by: OBSTETRICS & GYNECOLOGY

## 2022-11-08 PROCEDURE — 85027 COMPLETE CBC AUTOMATED: CPT | Performed by: OBSTETRICS & GYNECOLOGY

## 2022-11-08 PROCEDURE — 87086 URINE CULTURE/COLONY COUNT: CPT | Performed by: OBSTETRICS & GYNECOLOGY

## 2022-11-08 PROCEDURE — 87340 HEPATITIS B SURFACE AG IA: CPT | Performed by: OBSTETRICS & GYNECOLOGY

## 2022-11-08 ASSESSMENT — PAIN SCALES - GENERAL: PAINLEVEL: NO PAIN (0)

## 2022-11-08 NOTE — NURSING NOTE
"Chief Complaint   Patient presents with     Prenatal Care     Pre new LMP- unknown       Initial /70 (BP Location: Left arm, Cuff Size: Adult Regular)   Pulse 85   Ht 1.626 m (5' 4\")   Wt 70.3 kg (155 lb)   SpO2 99%   BMI 26.61 kg/m   Estimated body mass index is 26.61 kg/m  as calculated from the following:    Height as of this encounter: 1.626 m (5' 4\").    Weight as of this encounter: 70.3 kg (155 lb).  Medication Reconciliation: complete  Oneyda Oropeza LPN  "

## 2022-11-08 NOTE — LETTER
November 10, 2022          Candis Marcos  [address]  [place] [address]        Dear Candis,             Your New OB labs are all normal. If you have any questions please call [phone].        Resulted Orders   HIV Antigen Antibody Combo   Result Value Ref Range    HIV Antigen Antibody Combo Nonreactive Nonreactive      Comment:      HIV-1 p24 Ag & HIV-1/HIV-2 Ab Not Detected   Hepatitis B surface antigen   Result Value Ref Range    Hepatitis B Surface Antigen Nonreactive Nonreactive   Treponema Abs w Reflex to RPR and Titer   Result Value Ref Range    Treponema Antibody Total Nonreactive Nonreactive   Urine Culture   Result Value Ref Range    Culture <10,000 CFU/mL Mixture of urogenital kamille    UA with Microscopic   Result Value Ref Range    Color Urine Light Yellow Colorless, Straw, Light Yellow, Yellow    Appearance Urine Clear Clear    Glucose Urine Negative Negative mg/dL    Bilirubin Urine Negative Negative    Ketones Urine Negative Negative mg/dL    Specific Gravity Urine 1.024 1.003 - 1.035    Blood Urine Negative Negative    pH Urine 5.5 4.7 - 8.0    Protein Albumin Urine Negative Negative mg/dL    Urobilinogen Urine Normal Normal, 2.0 mg/dL    Nitrite Urine Negative Negative    Leukocyte Esterase Urine Small (A) Negative    Mucus Urine Present (A) None Seen /LPF    RBC Urine <1 <=2 /HPF    WBC Urine <1 <=5 /HPF    Squamous Epithelials Urine 1 <=1 /HPF   CBC with platelets   Result Value Ref Range    WBC Count 12.3 (H) 4.0 - 11.0 10e3/uL    RBC Count 4.48 3.80 - 5.20 10e6/uL    Hemoglobin 13.2 11.7 - 15.7 g/dL    Hematocrit 40.4 35.0 - 47.0 %    MCV 90 78 - 100 fL    MCH 29.5 26.5 - 33.0 pg    MCHC 32.7 31.5 - 36.5 g/dL    RDW 13.1 10.0 - 15.0 %    Platelet Count 343 150 - 450 10e3/uL   Hepatitis C antibody   Result Value Ref Range    Hepatitis C Antibody Nonreactive Nonreactive    Narrative    Assay performance characteristics have not been established for newborns, infants, and children.    Urine Drugs of Abuse Screen Panel 13   Result Value Ref Range    Cannabinoids (24-ppo-2-carboxy-9-THC) Not Detected Not Detected, Indeterminate      Comment:      Cutoff for a negative cannabinoid is 50 ng/mL or less.    Phencyclidine Not Detected Not Detected, Indeterminate      Comment:      Cutoff for a negative PCP is 25 ng/mL or less.    Cocaine (Benzoylecgonine) Not Detected Not Detected, Indeterminate      Comment:      Cutoff for a negative cocaine is 150 ng/ml or less.    Methamphetamine (d-Methamphetamine) Not Detected Not Detected, Indeterminate      Comment:      Cutoff for a negative methamphetamine is 500 ng/ml or less.    Opiates (Morphine) Not Detected Not Detected, Indeterminate      Comment:      Cutoff for a negative opiate is 100 ng/ml or less.    Amphetamine (d-Amphetamine) Not Detected Not Detected, Indeterminate      Comment:      Cutoff for a negative amphetamine is 500 ng/mL or less.    Benzodiazepines (Nordiazepam) Not Detected Not Detected, Indeterminate      Comment:      Cutoff for a negative benzodiazepine is 150 ng/ml or less.    Tricyclic Antidepressants (Desipramine) Not Detected Not Detected, Indeterminate      Comment:      Cutoff for a negative tricyclic antidepressant is 300 ng/ml or less.    Methadone Not Detected Not Detected, Indeterminate      Comment:      Cutoff for a negative methadone is 200 ng/ml or less.    Barbiturates (Butalbital) Not Detected Not Detected, Indeterminate      Comment:      Cutoff for a negative barbituate is 200 ng/ml or less.    Oxycodone Not Detected Not Detected, Indeterminate      Comment:      Cutoff for a negative oxycodone is 100 ng/mL or less.    Propoxyphene (Norpropoxyphene) Not Detected Not Detected, Indeterminate      Comment:      Cutoff for a negative propoxyphene is 300 ng/ml or less.    Buprenorphine Not Detected Not Detected, Indeterminate      Comment:      Cutoff for a negative buprenorphine is 10 ng/ml or less.   Adult Type and  Screen   Result Value Ref Range    ABO/RH(D) A POS     Antibody Screen Negative Negative    SPECIMEN EXPIRATION DATE [id number]        If you have any questions or concerns, please call the clinic at the number listed above.       Sincerely,      Ethan Mesa MD/ Alcira CARR

## 2022-11-08 NOTE — PROGRESS NOTES
"  HPI:  Candis Marcos is a 27 year old female  No LMP recorded. Patient is pregnant. at Unknown, Estimated Date of Delivery: Data Unavailable.  She denies vaginal bleeding, vomiting and abdominal pain.   No other c/o. Uncertain dates, no period since delivery.  Stopped BF 1 month ago.  + pregnancy test 2 weeks ago.  Prior CD for CPD 8 months ago.     Past Medical History:   Diagnosis Date     Anemia of pregnancy, third trimester 12/15/2021    Iron 325 mg daily     Infection due to 2019 novel coronavirus 2022       Past Surgical History:   Procedure Laterality Date      SECTION N/A 3/9/2022    Procedure: PRIMARY  SECTION;  Surgeon: Fredrick Severino MD;  Location: [facility]       Allergies: Patient has no known allergies.     ROS:   Denies fever, wt loss   Neg /GI other than per HPI      EXAM:  Blood pressure 105/70, pulse 85, height 1.626 m (5' 4\"), weight 70.3 kg (155 lb), SpO2 99 %, unknown if currently breastfeeding.   BMI= Body mass index is 26.61 kg/m .  General - pleasant female in no acute distress.  Abdomen - soft, nontender, nondistended, no hepatosplenomegaly.  Pelvic - EG: normal adult female, BUS: within normal limits,Rectovaginal - deferred.    US:    Transvaginal:Yes  Yolk sac present:Yes  CRL:  20mm  FCA present:Yes  EGA 8w 4d  GIORGIO:22          ASSESSMENT/PLAN:  (Z34.81) Encounter for supervision of other normal pregnancy in first trimester  (primary encounter diagnosis)  Comment:viable IUP with uncertain dates.  Use US GIORGIO  Plan: HIV Antigen Antibody Combo, Rubella Antibody         IgG, Hepatitis B surface antigen, Treponema Abs        w Reflex to RPR and Titer, Urine Culture, UA         with Microscopic, CBC with platelets, ABO/Rh         type and screen, Hepatitis C antibody, Urine         Drugs of Abuse Screen Panel 13         Prior CD for CPD and short interval pregnancy.  Counseled, desires repeat CD.     1st Trimester precautions and testing discussed.  New OB " Labs ordered and exam scheduled.  Aneuploidy testing options discussed.  Patient has my card and phone number to call prn if problems in interim.    Ethan Mesa MD

## 2022-11-09 LAB
HBV SURFACE AG SERPL QL IA: NONREACTIVE
HCV AB SERPL QL IA: NONREACTIVE
HIV 1+2 AB+HIV1 P24 AG SERPL QL IA: NONREACTIVE
T PALLIDUM AB SER QL: NONREACTIVE

## 2022-11-10 LAB
BACTERIA UR CULT: NORMAL
RUBV IGG SERPL QL IA: 1.29 INDEX
RUBV IGG SERPL QL IA: POSITIVE

## 2022-12-09 ENCOUNTER — PRENATAL OFFICE VISIT (OUTPATIENT)
Dept: OBGYN | Facility: OTHER | Age: 27
End: 2022-12-09
Attending: NURSE PRACTITIONER
Payer: COMMERCIAL

## 2022-12-09 VITALS
WEIGHT: 154.4 LBS | OXYGEN SATURATION: 97 % | HEART RATE: 72 BPM | DIASTOLIC BLOOD PRESSURE: 68 MMHG | BODY MASS INDEX: 26.36 KG/M2 | SYSTOLIC BLOOD PRESSURE: 107 MMHG | HEIGHT: 64 IN

## 2022-12-09 DIAGNOSIS — N89.8 VAGINAL DISCHARGE: ICD-10-CM

## 2022-12-09 DIAGNOSIS — Z34.82 NORMAL PREGNANCY IN MULTIGRAVIDA IN SECOND TRIMESTER: ICD-10-CM

## 2022-12-09 DIAGNOSIS — Z12.4 SCREENING FOR CERVICAL CANCER: Primary | ICD-10-CM

## 2022-12-09 DIAGNOSIS — O34.219 PREVIOUS CESAREAN DELIVERY, ANTEPARTUM CONDITION OR COMPLICATION: ICD-10-CM

## 2022-12-09 DIAGNOSIS — Z34.82 ENCOUNTER FOR SUPERVISION OF OTHER NORMAL PREGNANCY IN SECOND TRIMESTER: ICD-10-CM

## 2022-12-09 PROBLEM — O99.013 ANEMIA OF PREGNANCY, THIRD TRIMESTER: Status: RESOLVED | Noted: 2021-12-15 | Resolved: 2022-12-09

## 2022-12-09 PROBLEM — Z36.89 ENCOUNTER FOR TRIAGE IN PREGNANT PATIENT: Status: RESOLVED | Noted: 2022-03-08 | Resolved: 2022-12-09

## 2022-12-09 PROBLEM — U07.1 INFECTION DUE TO 2019 NOVEL CORONAVIRUS: Status: RESOLVED | Noted: 2022-01-13 | Resolved: 2022-12-09

## 2022-12-09 LAB
C TRACH DNA SPEC QL PROBE+SIG AMP: NEGATIVE
CLUE CELLS: ABNORMAL
N GONORRHOEA DNA SPEC QL NAA+PROBE: NEGATIVE
TRICHOMONAS, WET PREP: ABNORMAL
WBC'S/HIGH POWER FIELD, WET PREP: ABNORMAL
YEAST, WET PREP: PRESENT

## 2022-12-09 PROCEDURE — 99207 PR PRENATAL VISIT: CPT | Performed by: NURSE PRACTITIONER

## 2022-12-09 PROCEDURE — 87591 N.GONORRHOEAE DNA AMP PROB: CPT | Performed by: NURSE PRACTITIONER

## 2022-12-09 PROCEDURE — 87210 SMEAR WET MOUNT SALINE/INK: CPT | Performed by: NURSE PRACTITIONER

## 2022-12-09 PROCEDURE — 87491 CHLMYD TRACH DNA AMP PROBE: CPT | Performed by: NURSE PRACTITIONER

## 2022-12-09 PROCEDURE — G0123 SCREEN CERV/VAG THIN LAYER: HCPCS | Performed by: NURSE PRACTITIONER

## 2022-12-09 ASSESSMENT — PAIN SCALES - GENERAL: PAINLEVEL: NO PAIN (0)

## 2022-12-09 ASSESSMENT — PATIENT HEALTH QUESTIONNAIRE - PHQ9: SUM OF ALL RESPONSES TO PHQ QUESTIONS 1-9: 1

## 2022-12-09 NOTE — PROGRESS NOTES
"  HPI:  Candis Marcos is a 27 year old female No LMP recorded. Patient is pregnant. at 13w0d, Estimated Date of Delivery: 2023.  She denies vaginal bleeding, nausea , vomiting and abdominal pain. Some increased vaginal discharge with odor. No other c/o. Declines NIPT.     Past Medical History:   Diagnosis Date     Anemia of pregnancy, third trimester 12/15/2021    Iron 325 mg daily     Infection due to 2019 novel coronavirus 2022       Past Surgical History:   Procedure Laterality Date      SECTION N/A 3/9/2022    Procedure: PRIMARY  SECTION;  Surgeon: Fredrick Severino MD;  Location: [facility]       Allergies: Patient has no known allergies.     EXAM:  Blood pressure 107/68, pulse 72, height 1.626 m (5' 4\"), weight 70 kg (154 lb 6.4 oz), SpO2 97 %, unknown if currently breastfeeding.   BMI= Body mass index is 26.5 kg/m .  General - pleasant female in no acute distress.  Neck - supple without lymphadenopathy or thyromegaly.  Lungs - clear to auscultation bilaterally.  Heart - regular rate and rhythm without murmur.  Abdomen - soft, nontender, nondistended, no hepatosplenomegaly.  Pelvic - EG: normal adult female, BUS: within normal limits, Vagina: well rugated, no discharge, Cervix: no lesions or CMT, closed/long Uterus: gravid, consistant with dates, mobile, Adnexae: no masses or tenderness.  Rectovaginal - deferred.  Musculoskeletal - no gross deformities.  Neurological - normal strength, sensation, and mental status.    Doptones were 162    ASSESSMENT/PLAN:  (Z12.4) Screening for cervical cancer  (primary encounter diagnosis)  Comment:   Plan: A pap thin layer screen reflex to HPV if ASCUS         - recommend age 25 - 29            (Z34.82) Encounter for supervision of other normal pregnancy in second trimester  Comment:   Plan: GC/Chlamydia by PCR - [facility],            (N89.8) Vaginal discharge  Comment:   Plan: Wet prep            (Z34.82) Normal pregnancy in multigravida in second " trimester  Comment: new ob physical and teaching complete  Plan: return in 4 weeks.     (O34.219) Previous  delivery, antepartum condition or complication  Comment:   Plan: planning repeat       Weight gain and exercise during pregnancy was discussed at today's visit.  The patient will return to clinic in 4 weeks for continued prenatal care.

## 2022-12-09 NOTE — PROGRESS NOTES
Have you had or do you currently have:    - Diabetes? N  - Hypertension? N  - Heart disease, mitral valve prolapse, or rheumatic fever?  N  - An autoimmune disease such as lupus or rheumatoid arthritis?  N  - Kidney disease, urinary tract infection?  Y  - Epilepsy, seizures, or spells?  N  - Migraine headaches?  N  - Any other neurological problems?  N  - Have you ever been treated for depression?  N  - Are you having problems with crying spells or loss of self-esteem?  N  - Have you ever required psychiatric care?  N  - Have you ever had hepatitis, liver disease, or jaundice?  N  - Have you ever been treated for blood clots in your veins, deep vein thrombosis, inflammation in the veins, thrombosis, phelbitis, pulmonary embolism or varicosities?  N  - Have you had excessive bleeding after surgery or dental work?  N  - Do you bleed more than other women after a cut or scratch?  N  - Do you have a history or anemia?  N  - Have you ever had thyroid problems or take thyroid medication?  N  - Do you have any endocrine problems?  N  - Have you every been in a major accident or suffered serious trauma?  N  - Within the last year, has anyone hit, slapped, kicked, or otherwise hurt you?  N  - In the last year, has anyone forced you to have sex when you didn't want to?  N  - Have you every received a blood transfusion?  N  - Would you refuse a blood transfusion if a doctor judged it to be medically necessary?  N  - Does anyone in your home smoke? N  - Do you use tobacco products?  N  - Do you drink beer, wine, or hard liquor?  N  - Do you use any of the following: marijuana, speed, cocaine, heroin, hallucinogens, or other drugs?  N  - Is your blood type RH negative?  N  - Have you ever had asthma?  N  - Have you ever had tuberculosis?  N  - Do you have any allergies to drugs or over-the-counter medications?  N  - Allergies: dust mites, aspartame, ethanol, venlafaxine hydrochloride, sertraline?  N  - Have you had any breast  problems?  N  - Have you ever breast-fed?  Y  - Have you had any gynecological surgical procedures such as cervical conization, LEEP, laser treatment, cryosurgery of the cervix, or a dilatation and curettage, etc?  N  - Have you ever had any other surgical procedures?  Y, C/S  - Have you ever had any anesthetic complications?  N  - Have you ever had an abnormal pap smear?  N  - Do you have a history of abnormalities of the uterus? N  - Did your mother take NANCY or any other hormones when she was pregnant with you?  N  - Did it take you more than one year to become pregnant?  N  - Have you ever been evaluated or treated for infertility?  N  - Is there a history of medical problems in your family, which you feel might adversely affect your health or pregnancy?  N  - Do you have any other problems we have not asked about which you feel may be important to this pregnancy?  N    Symptoms since last menstrual period  - Do you currently have any of the following symptoms: abdominal pain, blood in the stool or urine, chest pain, shortness of breath, coughing or vomiting up blood, you heart is racing or skipping beats, nausea and vomiting, pain on urination, or vaginal discharge or bleeding?  N    Genetic screening  Has the patient, baby's father, or anyone in either family had:  - Thalassemia (Italian, Greek, Mediterranean, or  background only) and an MCV result less than 80?  N  - Neural tube defect such as meningomyelocele, spina bifida, or anencephaly?  N  - Congential heart defect?  N  - Down's syndrome?  N  - Sidney-Sachs disease ( Sabianism, Cajun, Tunisian-[place])?  N  - Sickle cell disease or trait () ?  N  - Hemophilia or other inherited problems of blood?  N  - Muscular dystrophy?  N  - Cystic fibrosis?  N  - De Baca's chorea?  N  - Mental retardation/autism?  N   If yes, was the person tested for Fragile X?  N  - Any other inherited genetic or chromosomal disorder?  N  - Maternal metabolic disorder (e.g.  insulin- dependent diabetes, PKU)?  N  - A child with birth defects not listed above?  N  - Recurrent pregnancy loss, or a stillbirth?  N  - Has the patient had any medications/street drugs/alcohol since her last menstrual period?  N  - Does the patient or baby's father have any other genetic risk?  N    Infection history  - Have you ever been treated for tuberculosis?  N  - Have you every had a positive skin test for tuberculosis?  N  - Do you live with someone who has tuberculosis?  N  - Have you ever been exposed to tuberculosis?  N  - Do you have genital herpes?  N  - Does your partner have genital herpes?  N  - Have you had a rash or viral illness since your last period?  N  - Have you ever had gonorrhea, chlamydia, syphilis, venereal warts, trichomoniasis, pelvic inflammatory disease, or any other sexually transmitted disease?  N  - Have you had chicken pox?  N  - Have you been vaccinated against chicken pox?  Y  - Have you had any other infectious diseases?  N

## 2022-12-14 LAB
BKR LAB AP GYN ADEQUACY: NORMAL
BKR LAB AP GYN INTERPRETATION: NORMAL
BKR LAB AP GYN OTHER FINDINGS: NORMAL
BKR LAB AP HPV REFLEX: NORMAL
BKR LAB AP PREVIOUS ABNORMAL: NORMAL
PATH REPORT.COMMENTS IMP SPEC: NORMAL
PATH REPORT.COMMENTS IMP SPEC: NORMAL
PATH REPORT.RELEVANT HX SPEC: NORMAL

## 2023-01-04 DIAGNOSIS — Z34.82 NORMAL PREGNANCY IN MULTIGRAVIDA IN SECOND TRIMESTER: Primary | ICD-10-CM

## 2023-01-12 ENCOUNTER — PRENATAL OFFICE VISIT (OUTPATIENT)
Dept: OBGYN | Facility: OTHER | Age: 28
End: 2023-01-12
Attending: OBSTETRICS & GYNECOLOGY
Payer: COMMERCIAL

## 2023-01-12 VITALS
OXYGEN SATURATION: 98 % | DIASTOLIC BLOOD PRESSURE: 68 MMHG | HEART RATE: 92 BPM | SYSTOLIC BLOOD PRESSURE: 116 MMHG | BODY MASS INDEX: 27.12 KG/M2 | WEIGHT: 158 LBS

## 2023-01-12 DIAGNOSIS — K42.9 UMBILICAL HERNIA WITHOUT OBSTRUCTION AND WITHOUT GANGRENE: ICD-10-CM

## 2023-01-12 DIAGNOSIS — Z34.82 NORMAL PREGNANCY IN MULTIGRAVIDA IN SECOND TRIMESTER: Primary | ICD-10-CM

## 2023-01-12 PROCEDURE — 99207 PR PRENATAL VISIT: CPT | Performed by: OBSTETRICS & GYNECOLOGY

## 2023-01-12 ASSESSMENT — PAIN SCALES - GENERAL: PAINLEVEL: NO PAIN (0)

## 2023-01-12 NOTE — PROGRESS NOTES
Doing well.  No concerns today except had noted umbilical hernia, confirmed on exam.  No pain, reduces easily.  Precautions discussed.  Consider Gen surg referral for repair pp.   Discussed genetic screening. Patient wishes to defer  US for full fetal anatomical survey ordered.  Return to clinic in 4 weeks    Ethan Mesa MD  1/12/2023

## 2023-01-29 ENCOUNTER — HOSPITAL ENCOUNTER (EMERGENCY)
Facility: HOSPITAL | Age: 28
Discharge: HOME | End: 2023-01-29
Attending: PHYSICIAN ASSISTANT | Admitting: PHYSICIAN ASSISTANT
Payer: COMMERCIAL

## 2023-01-29 VITALS
DIASTOLIC BLOOD PRESSURE: 72 MMHG | SYSTOLIC BLOOD PRESSURE: 113 MMHG | RESPIRATION RATE: 16 BRPM | TEMPERATURE: 97.3 F | OXYGEN SATURATION: 99 % | HEART RATE: 74 BPM

## 2023-01-29 DIAGNOSIS — N30.00 ACUTE CYSTITIS WITHOUT HEMATURIA: ICD-10-CM

## 2023-01-29 LAB
ALBUMIN UR-MCNC: NEGATIVE MG/DL
APPEARANCE UR: CLEAR
BILIRUB UR QL STRIP: NEGATIVE
COLOR UR AUTO: ABNORMAL
GLUCOSE UR STRIP-MCNC: NEGATIVE MG/DL
HGB UR QL STRIP: NEGATIVE
KETONES UR STRIP-MCNC: NEGATIVE MG/DL
LEUKOCYTE ESTERASE UR QL STRIP: ABNORMAL
MUCOUS THREADS #/AREA URNS LPF: PRESENT /LPF
NITRATE UR QL: NEGATIVE
PH UR STRIP: 6.5 [PH] (ref 4.7–8)
RBC URINE: 1 /HPF
SP GR UR STRIP: 1.02 (ref 1–1.03)
SQUAMOUS EPITHELIAL: 2 /HPF
UROBILINOGEN UR STRIP-MCNC: NORMAL MG/DL
WBC URINE: 8 /HPF

## 2023-01-29 PROCEDURE — 87086 URINE CULTURE/COLONY COUNT: CPT | Performed by: PHYSICIAN ASSISTANT

## 2023-01-29 PROCEDURE — 81001 URINALYSIS AUTO W/SCOPE: CPT | Performed by: PHYSICIAN ASSISTANT

## 2023-01-29 PROCEDURE — G0463 HOSPITAL OUTPT CLINIC VISIT: HCPCS

## 2023-01-29 PROCEDURE — 99213 OFFICE O/P EST LOW 20 MIN: CPT | Performed by: PHYSICIAN ASSISTANT

## 2023-01-29 RX ORDER — CEFPODOXIME PROXETIL 100 MG/1
100 TABLET, FILM COATED ORAL 2 TIMES DAILY
Qty: 14 TABLET | Refills: 0 | Status: SHIPPED | OUTPATIENT
Start: 2023-01-29 | End: 2023-01-29

## 2023-01-29 NOTE — DISCHARGE INSTRUCTIONS
Increase fluids.  Be sure to urinate after sexual activity.  Take the Cefpodoxime as prescribed for your bladder infection.   Return here with any new or worsening symptoms including fevers/chills, vomiting, or back pain.

## 2023-01-29 NOTE — ED PROVIDER NOTES
History     Chief Complaint   Patient presents with     Rule out Urinary Tract Infection     Symptoms started today - frequency and burning. 20 weeks pregnant     HPI  Candis Marcos is a 27 year old female who is 20 weeks pregnant who presents today for dysuria, urgency, and frequency x 1 day. Denies fevers/chills, back pain, abdominal pain, or vaginal bleeding.     Allergies:  No Known Allergies    Problem List:    Patient Active Problem List    Diagnosis Date Noted     Normal pregnancy in multigravida in second trimester 2022     Priority: Medium     Declines NIPT  RCD  Breastfeeding  Covid Prior to Pregnancy  Covid Vaccine Done  Flu Shot Done       Previous  delivery, antepartum condition or complication 2022     Priority: Medium     RCD          Past Medical History:    Past Medical History:   Diagnosis Date     Anemia of pregnancy, third trimester 12/15/2021     Infection due to 2019 novel coronavirus 2022       Past Surgical History:    Past Surgical History:   Procedure Laterality Date      SECTION N/A 3/9/2022    Procedure: PRIMARY  SECTION;  Surgeon: Fredrick Severino MD;  Location: [facility]       Family History:    No family history on file.    Social History:  Marital Status:   [2]  Social History     Tobacco Use     Smoking status: Never     Smokeless tobacco: Never   Substance Use Topics     Alcohol use: No     Drug use: No        Medications:    acetaminophen (TYLENOL) 500 MG tablet  cefpodoxime (VANTIN) 100 MG tablet  Prenatal Vit-Fe Fumarate-FA (PRENATAL VITAMINS PO)          Review of Systems   All other systems reviewed and are negative.      Physical Exam   BP: 113/72  Pulse: 74  Temp: 97.3  F (36.3  C)  Resp: 16  SpO2: 99 %      Physical Exam  Vitals and nursing note reviewed.   Constitutional:       General: She is not in acute distress.     Appearance: She is well-developed. She is not ill-appearing, toxic-appearing or diaphoretic.   HENT:       Head: Normocephalic and atraumatic.      Right Ear: External ear normal.      Left Ear: External ear normal.      Nose: Nose normal.      Mouth/Throat:      Pharynx: No oropharyngeal exudate.   Eyes:      General: No scleral icterus.        Right eye: No discharge.         Left eye: No discharge.      Conjunctiva/sclera: Conjunctivae normal.      Pupils: Pupils are equal, round, and reactive to light.   Cardiovascular:      Rate and Rhythm: Normal rate and regular rhythm.      Heart sounds: Normal heart sounds. No murmur heard.    No friction rub. No gallop.   Pulmonary:      Effort: Pulmonary effort is normal. No respiratory distress.      Breath sounds: Normal breath sounds. No wheezing or rales.   Chest:      Chest wall: No tenderness.   Abdominal:      General: Bowel sounds are normal. There is no distension.      Palpations: Abdomen is soft. There is no mass.      Tenderness: There is no abdominal tenderness. There is no guarding or rebound.      Comments: Gravid abdomen.    Musculoskeletal:         General: Normal range of motion.      Cervical back: Normal range of motion and neck supple.   Lymphadenopathy:      Cervical: No cervical adenopathy.   Skin:     General: Skin is warm and dry.      Coloration: Skin is not pale.      Findings: No erythema or rash.   Neurological:      Mental Status: She is alert and oriented to person, place, and time.      Cranial Nerves: No cranial nerve deficit.   Psychiatric:         Behavior: Behavior normal.         Thought Content: Thought content normal.         Judgment: Judgment normal.         ED Course                 Procedures                Results for orders placed or performed during the hospital encounter of 01/29/23 (from the past 24 hour(s))   UA reflex to Microscopic and Culture    Specimen: Urine, Midstream   Result Value Ref Range    Color Urine Light Yellow Colorless, Straw, Light Yellow, Yellow    Appearance Urine Clear Clear    Glucose Urine Negative  Negative mg/dL    Bilirubin Urine Negative Negative    Ketones Urine Negative Negative mg/dL    Specific Gravity Urine 1.017 1.003 - 1.035    Blood Urine Negative Negative    pH Urine 6.5 4.7 - 8.0    Protein Albumin Urine Negative Negative mg/dL    Urobilinogen Urine Normal Normal, 2.0 mg/dL    Nitrite Urine Negative Negative    Leukocyte Esterase Urine Moderate (A) Negative    Mucus Urine Present (A) None Seen /LPF    RBC Urine 1 <=2 /HPF    WBC Urine 8 (H) <=5 /HPF    Squamous Epithelials Urine 2 (H) <=1 /HPF    Narrative    Urine Culture ordered based on laboratory criteria       Medications - No data to display    Assessments & Plan (with Medical Decision Making)   Pt is 20 weeks pregnant with urgency, dysuria, and urinary frequency x 1 day. No clinical or exam findings to suggest acute pyelo. UA reflects acute cystitis, culture pending. Will cover with Cefpodoxime 100mg BID x 7 days. She was discharged home in good condition following.     Plan: Increase fluids.  Be sure to urinate after sexual activity.  Take the Cefpodoxime as prescribed for your bladder infection.   Return here with any new or worsening symptoms including fevers/chills, vomiting, or back pain.     I have reviewed the nursing notes.    I have reviewed the findings, diagnosis, plan and need for follow up with the patient.    New Prescriptions    CEFPODOXIME (VANTIN) 100 MG TABLET    Take 1 tablet (100 mg) by mouth 2 times daily for 7 days       Final diagnoses:   Acute cystitis without hematuria       1/29/2023   [facility]

## 2023-01-29 NOTE — ED TRIAGE NOTES
Pt presents with c/o uti sx  Started today, has frequency, urgency and burning  Denies any blood at this time  Started today   No otc meds taken

## 2023-01-31 LAB — BACTERIA UR CULT: NORMAL

## 2023-02-03 ENCOUNTER — HOSPITAL ENCOUNTER (OUTPATIENT)
Dept: ULTRASOUND IMAGING | Facility: HOSPITAL | Age: 28
Discharge: HOME | End: 2023-02-03
Attending: OBSTETRICS & GYNECOLOGY | Admitting: OBSTETRICS & GYNECOLOGY
Payer: COMMERCIAL

## 2023-02-03 DIAGNOSIS — Z34.82 NORMAL PREGNANCY IN MULTIGRAVIDA IN SECOND TRIMESTER: ICD-10-CM

## 2023-02-03 PROCEDURE — 76805 OB US >/= 14 WKS SNGL FETUS: CPT

## 2023-02-07 ENCOUNTER — PRENATAL OFFICE VISIT (OUTPATIENT)
Dept: OBGYN | Facility: OTHER | Age: 28
End: 2023-02-07
Attending: NURSE PRACTITIONER
Payer: COMMERCIAL

## 2023-02-07 VITALS
HEART RATE: 83 BPM | OXYGEN SATURATION: 99 % | HEIGHT: 64 IN | WEIGHT: 165.3 LBS | DIASTOLIC BLOOD PRESSURE: 75 MMHG | BODY MASS INDEX: 28.22 KG/M2 | SYSTOLIC BLOOD PRESSURE: 117 MMHG

## 2023-02-07 DIAGNOSIS — Z34.82 NORMAL PREGNANCY IN MULTIGRAVIDA IN SECOND TRIMESTER: Primary | ICD-10-CM

## 2023-02-07 PROCEDURE — 99207 PR PRENATAL VISIT: CPT | Performed by: NURSE PRACTITIONER

## 2023-02-07 ASSESSMENT — PAIN SCALES - GENERAL: PAINLEVEL: NO PAIN (0)

## 2023-02-07 NOTE — PROGRESS NOTES
Denies concerns.  Baby active.  No cramping or spotting.  Anatomy screen reviewed. Girl.  Mid pregnancy changes and ocmfort measures reviewed.  Return in 4 weeks.

## 2023-03-07 ENCOUNTER — PRENATAL OFFICE VISIT (OUTPATIENT)
Dept: OBGYN | Facility: OTHER | Age: 28
End: 2023-03-07
Attending: NURSE PRACTITIONER
Payer: COMMERCIAL

## 2023-03-07 VITALS
HEIGHT: 64 IN | BODY MASS INDEX: 29.06 KG/M2 | OXYGEN SATURATION: 100 % | HEART RATE: 77 BPM | DIASTOLIC BLOOD PRESSURE: 70 MMHG | SYSTOLIC BLOOD PRESSURE: 109 MMHG | WEIGHT: 170.2 LBS

## 2023-03-07 DIAGNOSIS — Z34.82 NORMAL PREGNANCY IN MULTIGRAVIDA IN SECOND TRIMESTER: Primary | ICD-10-CM

## 2023-03-07 PROCEDURE — 99207 PR PRENATAL VISIT: CPT | Performed by: NURSE PRACTITIONER

## 2023-03-07 ASSESSMENT — PAIN SCALES - GENERAL: PAINLEVEL: NO PAIN (0)

## 2023-03-08 NOTE — PROGRESS NOTES
Doing well.  Denies concerns.  Baby active.  Discussed upcoming 28 week labs and Tdap.  Changes and comfort measures reviewed.  Return in 4 weeks.

## 2023-04-03 DIAGNOSIS — Z34.82 NORMAL PREGNANCY IN MULTIGRAVIDA IN SECOND TRIMESTER: Primary | ICD-10-CM

## 2023-04-04 ENCOUNTER — PRENATAL OFFICE VISIT (OUTPATIENT)
Dept: OBGYN | Facility: OTHER | Age: 28
End: 2023-04-04
Attending: OBSTETRICS & GYNECOLOGY
Payer: COMMERCIAL

## 2023-04-04 ENCOUNTER — LAB (OUTPATIENT)
Dept: LAB | Facility: OTHER | Age: 28
End: 2023-04-04
Attending: OBSTETRICS & GYNECOLOGY
Payer: COMMERCIAL

## 2023-04-04 VITALS
HEART RATE: 93 BPM | BODY MASS INDEX: 29.84 KG/M2 | DIASTOLIC BLOOD PRESSURE: 60 MMHG | SYSTOLIC BLOOD PRESSURE: 100 MMHG | RESPIRATION RATE: 14 BRPM | OXYGEN SATURATION: 98 % | HEIGHT: 64 IN | WEIGHT: 174.8 LBS

## 2023-04-04 DIAGNOSIS — Z23 NEED FOR VACCINATION: Primary | ICD-10-CM

## 2023-04-04 DIAGNOSIS — Z34.82 NORMAL PREGNANCY IN MULTIGRAVIDA IN SECOND TRIMESTER: ICD-10-CM

## 2023-04-04 DIAGNOSIS — R73.09 ABNORMAL GLUCOSE TOLERANCE TEST: ICD-10-CM

## 2023-04-04 LAB
ANTIBODY SCREEN: NEGATIVE
ERYTHROCYTE [DISTWIDTH] IN BLOOD BY AUTOMATED COUNT: 12.7 % (ref 10–15)
GLUCOSE 1H P 50 G GLC PO SERPL-MCNC: 150 MG/DL (ref 70–129)
HCT VFR BLD AUTO: 33.8 % (ref 35–47)
HGB BLD-MCNC: 11 G/DL (ref 11.7–15.7)
MCH RBC QN AUTO: 29.3 PG (ref 26.5–33)
MCHC RBC AUTO-ENTMCNC: 32.5 G/DL (ref 31.5–36.5)
MCV RBC AUTO: 90 FL (ref 78–100)
PLATELET # BLD AUTO: 236 10E3/UL (ref 150–450)
RBC # BLD AUTO: 3.76 10E6/UL (ref 3.8–5.2)
SPECIMEN EXPIRATION DATE: NORMAL
WBC # BLD AUTO: 10.8 10E3/UL (ref 4–11)

## 2023-04-04 PROCEDURE — 90471 IMMUNIZATION ADMIN: CPT | Performed by: OBSTETRICS & GYNECOLOGY

## 2023-04-04 PROCEDURE — 85027 COMPLETE CBC AUTOMATED: CPT

## 2023-04-04 PROCEDURE — 86850 RBC ANTIBODY SCREEN: CPT

## 2023-04-04 PROCEDURE — 86780 TREPONEMA PALLIDUM: CPT

## 2023-04-04 PROCEDURE — 36415 COLL VENOUS BLD VENIPUNCTURE: CPT

## 2023-04-04 PROCEDURE — 82950 GLUCOSE TEST: CPT

## 2023-04-04 PROCEDURE — 90715 TDAP VACCINE 7 YRS/> IM: CPT | Performed by: OBSTETRICS & GYNECOLOGY

## 2023-04-04 PROCEDURE — 99207 PR PRENATAL VISIT: CPT | Performed by: OBSTETRICS & GYNECOLOGY

## 2023-04-04 ASSESSMENT — PAIN SCALES - GENERAL: PAINLEVEL: NO PAIN (0)

## 2023-04-06 LAB — T PALLIDUM AB SER QL: NONREACTIVE

## 2023-04-14 ENCOUNTER — LAB (OUTPATIENT)
Dept: LAB | Facility: OTHER | Age: 28
End: 2023-04-14
Payer: COMMERCIAL

## 2023-04-14 ENCOUNTER — TELEPHONE (OUTPATIENT)
Dept: NUTRITION | Facility: HOSPITAL | Age: 28
End: 2023-04-14

## 2023-04-14 DIAGNOSIS — O24.419 GDM (GESTATIONAL DIABETES MELLITUS): Primary | ICD-10-CM

## 2023-04-14 DIAGNOSIS — R73.09 ABNORMAL GLUCOSE TOLERANCE TEST: ICD-10-CM

## 2023-04-14 DIAGNOSIS — O24.419 GESTATIONAL DIABETES: Primary | ICD-10-CM

## 2023-04-14 LAB
GESTATIONAL GTT 1 HR POST DOSE: 173 MG/DL (ref 60–179)
GESTATIONAL GTT 2 HR POST DOSE: 211 MG/DL (ref 60–154)
GESTATIONAL GTT 3 HR POST DOSE: 158 MG/DL (ref 60–139)
GLUCOSE P FAST SERPL-MCNC: 84 MG/DL (ref 60–94)

## 2023-04-14 PROCEDURE — 82951 GLUCOSE TOLERANCE TEST (GTT): CPT

## 2023-04-14 PROCEDURE — 36415 COLL VENOUS BLD VENIPUNCTURE: CPT

## 2023-04-14 PROCEDURE — 82952 GTT-ADDED SAMPLES: CPT

## 2023-04-14 NOTE — TELEPHONE ENCOUNTER
Diabetes Self-Management Education & Support    Message left for return call.     Sent generic order for meter and supplies with directions to test. Use to test blood sugar 4 times daily: Fasting in the morning and 1 hour after eating.     DRC RN will follow up on Monday.     Keara Welch RN Diabetes Educator,  [phone]  4/14/2023 at 4:08 PM

## 2023-04-14 NOTE — TELEPHONE ENCOUNTER
I called this patient to schedule with Latonia Howell for GDM. She is scheduled 4/26/23 with Latonia but she is requesting a call back from somebody that could give her some advise as to what she is supposed to do until this appointment. She states she knows she is supposed to be counting her carbs, but would like to speak with somebody for more advise.    She can be reached at [phone]

## 2023-04-14 NOTE — TELEPHONE ENCOUNTER
Candis returns call.   Will test BG four times daily: Fasting in the morning and 1 hour after eating.   Reviewed parameters: FBG 60-<95. 1 hour PP <140.    DRC RN will follow up next week to review BG.     Keara Welch RN Diabetes Educator,  [phone]  4/14/2023 at 4:31 PM

## 2023-04-16 ENCOUNTER — HEALTH MAINTENANCE LETTER (OUTPATIENT)
Age: 28
End: 2023-04-16

## 2023-04-17 ENCOUNTER — PATIENT OUTREACH (OUTPATIENT)
Dept: EDUCATION SERVICES | Facility: HOSPITAL | Age: 28
End: 2023-04-17

## 2023-04-17 NOTE — PROGRESS NOTES
Diabetes Self-Management Education & Support    Follow up BG and appointment for GDM.     31w3d     Has been checking FBG: has not had any readings above 84 in the mornings. And has not gone above 136 1 hour post meal.   Has been keeping food log.     Offered appointment for sooner this week-   Candis is off on Tuesday and Wednesday. 4/19 at 8 offered, Candis accepted.    Keara Welch RN Diabetes Educator,  [phone]  4/17/2023 at 4:20 PM

## 2023-04-18 ENCOUNTER — PRENATAL OFFICE VISIT (OUTPATIENT)
Dept: OBGYN | Facility: OTHER | Age: 28
End: 2023-04-18
Attending: OBSTETRICS & GYNECOLOGY
Payer: COMMERCIAL

## 2023-04-18 VITALS
BODY MASS INDEX: 29.61 KG/M2 | WEIGHT: 172.5 LBS | SYSTOLIC BLOOD PRESSURE: 110 MMHG | HEART RATE: 113 BPM | DIASTOLIC BLOOD PRESSURE: 75 MMHG | OXYGEN SATURATION: 99 %

## 2023-04-18 DIAGNOSIS — O24.410 DIET CONTROLLED GESTATIONAL DIABETES MELLITUS (GDM) IN THIRD TRIMESTER: Primary | ICD-10-CM

## 2023-04-18 DIAGNOSIS — O34.219 PREVIOUS CESAREAN DELIVERY, ANTEPARTUM CONDITION OR COMPLICATION: ICD-10-CM

## 2023-04-18 PROCEDURE — 99207 PR PRENATAL VISIT: CPT | Performed by: OBSTETRICS & GYNECOLOGY

## 2023-04-18 ASSESSMENT — PAIN SCALES - GENERAL: PAINLEVEL: NO PAIN (0)

## 2023-04-19 ENCOUNTER — HOSPITAL ENCOUNTER (OUTPATIENT)
Dept: EDUCATION SERVICES | Facility: HOSPITAL | Age: 28
Discharge: HOME | End: 2023-04-19
Attending: NURSE PRACTITIONER | Admitting: NURSE PRACTITIONER
Payer: COMMERCIAL

## 2023-04-19 VITALS
HEIGHT: 65 IN | WEIGHT: 172.9 LBS | BODY MASS INDEX: 28.81 KG/M2 | SYSTOLIC BLOOD PRESSURE: 111 MMHG | RESPIRATION RATE: 16 BRPM | HEART RATE: 96 BPM | DIASTOLIC BLOOD PRESSURE: 73 MMHG | OXYGEN SATURATION: 100 %

## 2023-04-19 PROCEDURE — G0108 DIAB MANAGE TRN  PER INDIV: HCPCS

## 2023-04-19 ASSESSMENT — ANXIETY QUESTIONNAIRES
6. BECOMING EASILY ANNOYED OR IRRITABLE: NOT AT ALL
1. FEELING NERVOUS, ANXIOUS, OR ON EDGE: NOT AT ALL
GAD7 TOTAL SCORE: 0
3. WORRYING TOO MUCH ABOUT DIFFERENT THINGS: NOT AT ALL
2. NOT BEING ABLE TO STOP OR CONTROL WORRYING: NOT AT ALL
7. FEELING AFRAID AS IF SOMETHING AWFUL MIGHT HAPPEN: NOT AT ALL
5. BEING SO RESTLESS THAT IT IS HARD TO SIT STILL: NOT AT ALL
GAD7 TOTAL SCORE: 0
4. TROUBLE RELAXING: NOT AT ALL
IF YOU CHECKED OFF ANY PROBLEMS ON THIS QUESTIONNAIRE, HOW DIFFICULT HAVE THESE PROBLEMS MADE IT FOR YOU TO DO YOUR WORK, TAKE CARE OF THINGS AT HOME, OR GET ALONG WITH OTHER PEOPLE: NOT DIFFICULT AT ALL

## 2023-04-19 ASSESSMENT — PAIN SCALES - GENERAL: PAINLEVEL: NO PAIN (0)

## 2023-04-19 NOTE — PATIENT INSTRUCTIONS
Recap of our visit:     Monitoring:    Check blood sugars 4 x/day.   Fasting in the morning and then 1 hour after your meal.     Target blood sugar: Fasting or before meals target is 60-94. 1 hour after eating < 140.      Medications:   none    Healthy Eatin-45 grams of carb for breakfast.   45 grams of carbs for lunch and dinner.     15-30 grams for snacks- optional.    Activity/Exercise:   Increase exercise as tolerated, even multiple short times during your day helps.     Great work!   Congratulations!    Follow up: Please send glucose reading once weekly. Okay to send via [employer] or can call in.      If you have any questions or concerns, please call me at [phone] or send [employer] message.    Thank you for coming in today!  Keara Welch RN Diabetes Educator

## 2023-04-19 NOTE — PROGRESS NOTES
Doing well.  No concerns today.  GDM appt tomorrow  Discussed kick counts and fetal movement.  Reportable signs and symptoms discussed.  RTC in 2 weeks  Denies PTL emir, nupur, jarrell Mesa MD  4/19/2023

## 2023-04-19 NOTE — PROGRESS NOTES
Diabetes Self-Management Education & Support    Type of Service: In Person Visit    SUBJECTIVE/OBJECTIVE:  Presents for education related to gestational diabetes.   Friend, Morena present for visit.     Cultural Influences/Ethnic Background:  Not  or     Estimated Date of Delivery: 2023    1 hour OGTT  Lab Results   Component Value Date    GLU1 150 (H) 2023       3 hour OGTT    Fasting  Lab Results   Component Value Date    GTTGF 84 2023       1 hour  Lab Results   Component Value Date    GTTG1 173 2023       2 hour  Lab Results   Component Value Date    GTTG2 211 (H) 2023       3 hour  Lab Results   Component Value Date    GTTG3 158 (H) 2023       Current Management:   Diet, activity. BG monitoring.     ASSESSMENT:  Candis, 27 year old comes to [facility] for GDM education. Second pregnancy, new GDM dx. .   31 w 5 days.   Nursing student, graduating in the coming weeks.   Testing BG 4 times daily.   Using food logs to keep track of carbs.   Walks frequently.      BG report from meter: 4-16 through today.   FB, 84, 79. Post meals: 132, 108, 96, 78, 97, 136, 92, 114.    Candsi's BG reading are in target range. Adjusting to new dx, was surprised to have GDM. Has been reading labels to help make better food choices, surprised with how many carbs are in various foods.     INTERVENTION:  Diet education.   BG review.   Offer encouragement/support.     Educational topics covered today:  GDM diagnosis, pathophysiology, Risks and Complications of GDM, Means of controlling GDM, Using a Blood Glucose Monitor, Blood Glucose Goals, Logging and Interpreting Glucose Results, When to Call a Diabetes Educator or OB Provider, Healthy Eating During Pregnancy, Counting Carbohydrates, Meal Planning for GDM, and Physical Activity    Educational materials provided today:   Gestational Diabetes booklet  Meal plan handout.   GDM Log sheets.  Sharps Disposal- reviewed.    Care After  Delivery- included in GDM booklet  Accu-Chek Guide Me- order sent in prior to visit to allow for BG monitoring.     Pt verbalized understanding of concepts discussed and recommendations provided today.     PLAN:  Check glucose 4 times daily, before breakfast and 1 hour after each meal.   Physical activity recommended.    Meal plan: 30-45 carbs at breakfast, 45 carbs at lunch, 45 carbs at supper, 15-30 carbs, 3 optional snacks a day.  Follow consistent CHO meal plan, eat CHO and protein/fat at all meals/snacks.    Call or MyChart message diabetes educator weekly with BG log.  Call or mychart with questions/concerns.     Time Spent: 30 minutes  Encounter Type: Individual    Any diabetes medication dose changes were made via the CDE Protocol and Collaborative Practice Agreement with the patient's referring provider. A copy of this encounter was shared with the provider.    Keara Welch RN Diabetes Educator,  [phone]  4/19/2023 at 7:17 PM

## 2023-05-04 ENCOUNTER — PRENATAL OFFICE VISIT (OUTPATIENT)
Dept: OBGYN | Facility: OTHER | Age: 28
End: 2023-05-04
Attending: OBSTETRICS & GYNECOLOGY
Payer: COMMERCIAL

## 2023-05-04 VITALS
DIASTOLIC BLOOD PRESSURE: 60 MMHG | SYSTOLIC BLOOD PRESSURE: 106 MMHG | BODY MASS INDEX: 29.53 KG/M2 | WEIGHT: 173 LBS | HEIGHT: 64 IN

## 2023-05-04 DIAGNOSIS — O26.843 UTERINE SIZE DATE DISCREPANCY, THIRD TRIMESTER: ICD-10-CM

## 2023-05-04 DIAGNOSIS — N89.8 VAGINAL DISCHARGE: Primary | ICD-10-CM

## 2023-05-04 DIAGNOSIS — O24.410 DIET CONTROLLED GESTATIONAL DIABETES MELLITUS (GDM) IN THIRD TRIMESTER: ICD-10-CM

## 2023-05-04 LAB
CLUE CELLS: ABNORMAL
TRICHOMONAS, WET PREP: ABNORMAL
WBC'S/HIGH POWER FIELD, WET PREP: ABNORMAL
YEAST, WET PREP: ABNORMAL

## 2023-05-04 PROCEDURE — 99207 PR PRENATAL VISIT: CPT | Performed by: OBSTETRICS & GYNECOLOGY

## 2023-05-04 PROCEDURE — 87210 SMEAR WET MOUNT SALINE/INK: CPT | Performed by: OBSTETRICS & GYNECOLOGY

## 2023-05-04 ASSESSMENT — PAIN SCALES - GENERAL: PAINLEVEL: NO PAIN (0)

## 2023-05-04 NOTE — PROGRESS NOTES
Doing well.  No concerns today except increased vaginal itching, clumpy yellow discharge.  Wet prep done.  Discussed kick counts and fetal movement.  BS nml  RTC in 2 weeks  US growth 2 weeks (uterine S>D)  Denies PTL sx, vb, lof  Ethan Mesa MD  5/4/2023

## 2023-05-18 ENCOUNTER — PRENATAL OFFICE VISIT (OUTPATIENT)
Dept: OBGYN | Facility: OTHER | Age: 28
End: 2023-05-18
Attending: OBSTETRICS & GYNECOLOGY
Payer: COMMERCIAL

## 2023-05-18 VITALS
HEART RATE: 103 BPM | WEIGHT: 175.6 LBS | SYSTOLIC BLOOD PRESSURE: 123 MMHG | BODY MASS INDEX: 30.14 KG/M2 | DIASTOLIC BLOOD PRESSURE: 60 MMHG | OXYGEN SATURATION: 97 %

## 2023-05-18 DIAGNOSIS — O24.410 DIET CONTROLLED GESTATIONAL DIABETES MELLITUS (GDM) IN THIRD TRIMESTER: ICD-10-CM

## 2023-05-18 DIAGNOSIS — O26.843 UTERINE SIZE DATE DISCREPANCY, THIRD TRIMESTER: Primary | ICD-10-CM

## 2023-05-18 PROCEDURE — 99207 PR PRENATAL VISIT: CPT | Performed by: OBSTETRICS & GYNECOLOGY

## 2023-05-18 ASSESSMENT — PAIN SCALES - GENERAL: PAINLEVEL: NO PAIN (0)

## 2023-05-19 ENCOUNTER — HOSPITAL ENCOUNTER (OUTPATIENT)
Dept: ULTRASOUND IMAGING | Facility: HOSPITAL | Age: 28
Discharge: HOME | End: 2023-05-19
Attending: OBSTETRICS & GYNECOLOGY | Admitting: OBSTETRICS & GYNECOLOGY
Payer: COMMERCIAL

## 2023-05-19 DIAGNOSIS — O24.410 DIET CONTROLLED GESTATIONAL DIABETES MELLITUS (GDM) IN THIRD TRIMESTER: ICD-10-CM

## 2023-05-19 DIAGNOSIS — O26.843 UTERINE SIZE DATE DISCREPANCY, THIRD TRIMESTER: ICD-10-CM

## 2023-05-19 PROCEDURE — 76816 OB US FOLLOW-UP PER FETUS: CPT

## 2023-05-25 ENCOUNTER — PREP FOR PROCEDURE (OUTPATIENT)
Dept: OBGYN | Facility: OTHER | Age: 28
End: 2023-05-25

## 2023-05-25 DIAGNOSIS — Z98.891 HX OF CESAREAN SECTION: Primary | ICD-10-CM

## 2023-05-26 ENCOUNTER — PRENATAL OFFICE VISIT (OUTPATIENT)
Dept: OBGYN | Facility: OTHER | Age: 28
End: 2023-05-26
Attending: NURSE PRACTITIONER
Payer: COMMERCIAL

## 2023-05-26 VITALS
WEIGHT: 176 LBS | HEIGHT: 64 IN | BODY MASS INDEX: 30.05 KG/M2 | DIASTOLIC BLOOD PRESSURE: 60 MMHG | SYSTOLIC BLOOD PRESSURE: 120 MMHG | HEART RATE: 125 BPM | OXYGEN SATURATION: 99 %

## 2023-05-26 DIAGNOSIS — O24.410 DIET CONTROLLED GESTATIONAL DIABETES MELLITUS (GDM) IN THIRD TRIMESTER: ICD-10-CM

## 2023-05-26 DIAGNOSIS — Z98.891 HX OF CESAREAN SECTION: Primary | ICD-10-CM

## 2023-05-26 PROCEDURE — 99207 PR PRENATAL VISIT: CPT | Performed by: NURSE PRACTITIONER

## 2023-05-26 ASSESSMENT — PAIN SCALES - GENERAL: PAINLEVEL: NO PAIN (0)

## 2023-06-01 ENCOUNTER — TELEPHONE (OUTPATIENT)
Dept: OBGYN | Facility: OTHER | Age: 28
End: 2023-06-01

## 2023-06-01 NOTE — TELEPHONE ENCOUNTER
6/1/2023 9:31 AM  C/S moved to 6/14/23 per Surgery Departments request. Dr. Mesa approved. Zeinab palm, OB approved, Niyah notified and will update Dr. Calderon's schedule block. Writer left VM to pt requesting call back.   Nati Suazo RN

## 2023-06-02 ENCOUNTER — PRENATAL OFFICE VISIT (OUTPATIENT)
Dept: OBGYN | Facility: OTHER | Age: 28
End: 2023-06-02
Attending: OBSTETRICS & GYNECOLOGY
Payer: COMMERCIAL

## 2023-06-02 VITALS
WEIGHT: 174 LBS | BODY MASS INDEX: 29.71 KG/M2 | HEART RATE: 83 BPM | OXYGEN SATURATION: 98 % | DIASTOLIC BLOOD PRESSURE: 62 MMHG | HEIGHT: 64 IN | SYSTOLIC BLOOD PRESSURE: 110 MMHG

## 2023-06-02 DIAGNOSIS — Z01.810 PRE-OPERATIVE CARDIOVASCULAR EXAMINATION: ICD-10-CM

## 2023-06-02 DIAGNOSIS — Z98.891 HX OF CESAREAN SECTION: Primary | ICD-10-CM

## 2023-06-02 DIAGNOSIS — O24.410 DIET CONTROLLED GESTATIONAL DIABETES MELLITUS (GDM) IN THIRD TRIMESTER: ICD-10-CM

## 2023-06-02 PROCEDURE — 99207 PR COMPLICATED OB VISIT: CPT | Performed by: OBSTETRICS & GYNECOLOGY

## 2023-06-02 ASSESSMENT — PAIN SCALES - GENERAL: PAINLEVEL: NO PAIN (0)

## 2023-06-02 NOTE — PROGRESS NOTES
Doing well.  No concerns today.  BS good.   Discussed signs of labor and when to call or come in.  Discussed kick counts and fetal movement.  Preop done.   H&P reviewed and unchanged from prenatal record.  Chest clear to auscultation.  CV regular rate and rhythm without murmurs.  Abdomen soft, nontender, gravid.  Extremities negative.  Reveiwed goals, risks, alternatives of a  section including bleeding, infection, and potential damage to other organs including bowel, bladder, baby, blood vessels and nerves.  Potential need for  in future.  Hosptial stay and recovery period discussed.  All questions were answered.    RTC in 1 week  Denies regular contractions, vaginal bleeding, HAMZAH Mesa MD  2023

## 2023-06-07 ENCOUNTER — MYC MEDICAL ADVICE (OUTPATIENT)
Dept: OBGYN | Facility: OTHER | Age: 28
End: 2023-06-07

## 2023-06-07 NOTE — TELEPHONE ENCOUNTER
6/7/2023 8:15 AM pt reports hemorrhoid is the size of a grape. Hemorrhoid has been present for one week. Encouraged pt to soak in tub throughout day, continue to use tucks and hemorrhoid cream, OTC hemorrhoid spray. Pt denies any signs or sx of any infection. Use stool softener. Encouraged pt to sit with a cushion/pad under seat do not sit for long periods of time. Pt will call clinic back if sx worsen. Pt has appointment schedule to see Dr. Mesa on 6/9/23. Pt agrees to plan. Nati Suazo RN

## 2023-06-09 ENCOUNTER — PRENATAL OFFICE VISIT (OUTPATIENT)
Dept: OBGYN | Facility: OTHER | Age: 28
End: 2023-06-09
Attending: OBSTETRICS & GYNECOLOGY
Payer: COMMERCIAL

## 2023-06-09 VITALS — BODY MASS INDEX: 30.21 KG/M2 | DIASTOLIC BLOOD PRESSURE: 70 MMHG | WEIGHT: 176 LBS | SYSTOLIC BLOOD PRESSURE: 118 MMHG

## 2023-06-09 DIAGNOSIS — Z98.891 HX OF CESAREAN SECTION: Primary | ICD-10-CM

## 2023-06-09 DIAGNOSIS — O24.410 DIET CONTROLLED GESTATIONAL DIABETES MELLITUS (GDM) IN THIRD TRIMESTER: ICD-10-CM

## 2023-06-09 PROCEDURE — 99207 PR PRENATAL VISIT: CPT | Performed by: OBSTETRICS & GYNECOLOGY

## 2023-06-09 ASSESSMENT — PAIN SCALES - GENERAL: PAINLEVEL: NO PAIN (0)

## 2023-06-09 NOTE — PROGRESS NOTES
Doing well.  No concerns today.  Discussed kick counts and fetal movement.  CS scheduled next week.   Peteries jeni, nupur, jarrell Mesa MD  6/9/2023

## 2023-06-11 ENCOUNTER — ANESTHESIA (OUTPATIENT)
Dept: SURGERY | Facility: HOSPITAL | Age: 28
End: 2023-06-11
Payer: COMMERCIAL

## 2023-06-11 ENCOUNTER — ANESTHESIA EVENT (OUTPATIENT)
Dept: SURGERY | Facility: HOSPITAL | Age: 28
End: 2023-06-11
Payer: COMMERCIAL

## 2023-06-11 ENCOUNTER — APPOINTMENT (OUTPATIENT)
Dept: ULTRASOUND IMAGING | Facility: HOSPITAL | Age: 28
End: 2023-06-11
Attending: NURSE ANESTHETIST, CERTIFIED REGISTERED
Payer: COMMERCIAL

## 2023-06-11 ENCOUNTER — HOSPITAL ENCOUNTER (INPATIENT)
Facility: HOSPITAL | Age: 28
LOS: 2 days | Discharge: HOME | End: 2023-06-13
Attending: OBSTETRICS & GYNECOLOGY | Admitting: OBSTETRICS & GYNECOLOGY
Payer: COMMERCIAL

## 2023-06-11 DIAGNOSIS — D62 ANEMIA DUE TO BLOOD LOSS, ACUTE: ICD-10-CM

## 2023-06-11 LAB
ABO/RH(D): NORMAL
ANTIBODY SCREEN: NEGATIVE
ERYTHROCYTE [DISTWIDTH] IN BLOOD BY AUTOMATED COUNT: 15.2 % (ref 10–15)
GLUCOSE BLDC GLUCOMTR-MCNC: 81 MG/DL (ref 70–99)
HCT VFR BLD AUTO: 33.1 % (ref 35–47)
HGB BLD-MCNC: 10.5 G/DL (ref 11.7–15.7)
HOLD SPECIMEN: NORMAL
MCH RBC QN AUTO: 26.1 PG (ref 26.5–33)
MCHC RBC AUTO-ENTMCNC: 31.7 G/DL (ref 31.5–36.5)
MCV RBC AUTO: 82 FL (ref 78–100)
PLATELET # BLD AUTO: 213 10E3/UL (ref 150–450)
RBC # BLD AUTO: 4.02 10E6/UL (ref 3.8–5.2)
RUPTURE OF FETAL MEMBRANES BY ROM PLUS: NEGATIVE
SPECIMEN EXPIRATION DATE: NORMAL
T PALLIDUM AB SER QL: NONREACTIVE
WBC # BLD AUTO: 10.8 10E3/UL (ref 4–11)

## 2023-06-11 PROCEDURE — 86780 TREPONEMA PALLIDUM: CPT | Performed by: OBSTETRICS & GYNECOLOGY

## 2023-06-11 PROCEDURE — 360N000076 HC SURGERY LEVEL 3, PER MIN: Performed by: OBSTETRICS & GYNECOLOGY

## 2023-06-11 PROCEDURE — 258N000003 HC RX IP 258 OP 636: Performed by: NURSE ANESTHETIST, CERTIFIED REGISTERED

## 2023-06-11 PROCEDURE — 250N000009 HC RX 250: Performed by: OBSTETRICS & GYNECOLOGY

## 2023-06-11 PROCEDURE — 250N000011 HC RX IP 250 OP 636: Performed by: OBSTETRICS & GYNECOLOGY

## 2023-06-11 PROCEDURE — 272N000001 HC OR GENERAL SUPPLY STERILE: Performed by: OBSTETRICS & GYNECOLOGY

## 2023-06-11 PROCEDURE — 250N000009 HC RX 250: Performed by: NURSE ANESTHETIST, CERTIFIED REGISTERED

## 2023-06-11 PROCEDURE — C9290 INJ, BUPIVACAINE LIPOSOME: HCPCS | Performed by: NURSE ANESTHETIST, CERTIFIED REGISTERED

## 2023-06-11 PROCEDURE — 120N000001 HC R&B MED SURG/OB

## 2023-06-11 PROCEDURE — 84112 EVAL AMNIOTIC FLUID PROTEIN: CPT | Performed by: OBSTETRICS & GYNECOLOGY

## 2023-06-11 PROCEDURE — 250N000011 HC RX IP 250 OP 636: Performed by: NURSE ANESTHETIST, CERTIFIED REGISTERED

## 2023-06-11 PROCEDURE — 36415 COLL VENOUS BLD VENIPUNCTURE: CPT | Performed by: OBSTETRICS & GYNECOLOGY

## 2023-06-11 PROCEDURE — 59510 CESAREAN DELIVERY: CPT | Performed by: NURSE ANESTHETIST, CERTIFIED REGISTERED

## 2023-06-11 PROCEDURE — 250N000013 HC RX MED GY IP 250 OP 250 PS 637: Performed by: OBSTETRICS & GYNECOLOGY

## 2023-06-11 PROCEDURE — 99140 ANES COMP EMERGENCY COND: CPT | Performed by: NURSE ANESTHETIST, CERTIFIED REGISTERED

## 2023-06-11 PROCEDURE — 370N000017 HC ANESTHESIA TECHNICAL FEE, PER MIN: Performed by: OBSTETRICS & GYNECOLOGY

## 2023-06-11 PROCEDURE — 86850 RBC ANTIBODY SCREEN: CPT | Performed by: OBSTETRICS & GYNECOLOGY

## 2023-06-11 PROCEDURE — 710N000010 HC RECOVERY PHASE 1, LEVEL 2, PER MIN: Performed by: OBSTETRICS & GYNECOLOGY

## 2023-06-11 PROCEDURE — 85027 COMPLETE CBC AUTOMATED: CPT | Performed by: OBSTETRICS & GYNECOLOGY

## 2023-06-11 PROCEDURE — 59510 CESAREAN DELIVERY: CPT | Performed by: OBSTETRICS & GYNECOLOGY

## 2023-06-11 PROCEDURE — 258N000003 HC RX IP 258 OP 636: Performed by: OBSTETRICS & GYNECOLOGY

## 2023-06-11 PROCEDURE — 86901 BLOOD TYPING SEROLOGIC RH(D): CPT | Performed by: OBSTETRICS & GYNECOLOGY

## 2023-06-11 PROCEDURE — 64488 TAP BLOCK BI INJECTION: CPT | Mod: XU | Performed by: NURSE ANESTHETIST, CERTIFIED REGISTERED

## 2023-06-11 RX ORDER — SODIUM CHLORIDE, SODIUM LACTATE, POTASSIUM CHLORIDE, CALCIUM CHLORIDE 600; 310; 30; 20 MG/100ML; MG/100ML; MG/100ML; MG/100ML
INJECTION, SOLUTION INTRAVENOUS CONTINUOUS PRN
Status: DISCONTINUED | OUTPATIENT
Start: 2023-06-11 | End: 2023-06-11

## 2023-06-11 RX ORDER — PROCHLORPERAZINE MALEATE 10 MG
10 TABLET ORAL EVERY 6 HOURS PRN
Status: DISCONTINUED | OUTPATIENT
Start: 2023-06-11 | End: 2023-06-13 | Stop reason: HOSPADM

## 2023-06-11 RX ORDER — OXYTOCIN 10 [USP'U]/ML
10 INJECTION, SOLUTION INTRAMUSCULAR; INTRAVENOUS
Status: DISCONTINUED | OUTPATIENT
Start: 2023-06-11 | End: 2023-06-13 | Stop reason: HOSPADM

## 2023-06-11 RX ORDER — SODIUM CHLORIDE, SODIUM LACTATE, POTASSIUM CHLORIDE, CALCIUM CHLORIDE 600; 310; 30; 20 MG/100ML; MG/100ML; MG/100ML; MG/100ML
INJECTION, SOLUTION INTRAVENOUS CONTINUOUS
Status: DISCONTINUED | OUTPATIENT
Start: 2023-06-11 | End: 2023-06-11 | Stop reason: HOSPADM

## 2023-06-11 RX ORDER — BACITRACIN ZINC 500 [USP'U]/G
OINTMENT TOPICAL PRN
Status: DISCONTINUED | OUTPATIENT
Start: 2023-06-11 | End: 2023-06-11 | Stop reason: HOSPADM

## 2023-06-11 RX ORDER — KETOROLAC TROMETHAMINE 30 MG/ML
INJECTION, SOLUTION INTRAMUSCULAR; INTRAVENOUS PRN
Status: DISCONTINUED | OUTPATIENT
Start: 2023-06-11 | End: 2023-06-11

## 2023-06-11 RX ORDER — OXYCODONE HYDROCHLORIDE 5 MG/1
5 TABLET ORAL EVERY 4 HOURS PRN
Status: DISCONTINUED | OUTPATIENT
Start: 2023-06-11 | End: 2023-06-13 | Stop reason: HOSPADM

## 2023-06-11 RX ORDER — SIMETHICONE 80 MG
80 TABLET,CHEWABLE ORAL 4 TIMES DAILY PRN
Status: DISCONTINUED | OUTPATIENT
Start: 2023-06-11 | End: 2023-06-13 | Stop reason: HOSPADM

## 2023-06-11 RX ORDER — HYDROMORPHONE HYDROCHLORIDE 4 MG/ML
INJECTION, SOLUTION INTRAMUSCULAR; INTRAVENOUS; SUBCUTANEOUS PRN
Status: DISCONTINUED | OUTPATIENT
Start: 2023-06-11 | End: 2023-06-11

## 2023-06-11 RX ORDER — LIDOCAINE 40 MG/G
CREAM TOPICAL
Status: DISCONTINUED | OUTPATIENT
Start: 2023-06-11 | End: 2023-06-11 | Stop reason: HOSPADM

## 2023-06-11 RX ORDER — OXYTOCIN 10 [USP'U]/ML
10 INJECTION, SOLUTION INTRAMUSCULAR; INTRAVENOUS
Status: DISCONTINUED | OUTPATIENT
Start: 2023-06-11 | End: 2023-06-11 | Stop reason: HOSPADM

## 2023-06-11 RX ORDER — KETOROLAC TROMETHAMINE 30 MG/ML
30 INJECTION, SOLUTION INTRAMUSCULAR; INTRAVENOUS EVERY 6 HOURS
Status: COMPLETED | OUTPATIENT
Start: 2023-06-11 | End: 2023-06-12

## 2023-06-11 RX ORDER — PROCHLORPERAZINE 25 MG
25 SUPPOSITORY, RECTAL RECTAL EVERY 12 HOURS PRN
Status: DISCONTINUED | OUTPATIENT
Start: 2023-06-11 | End: 2023-06-13 | Stop reason: HOSPADM

## 2023-06-11 RX ORDER — AMOXICILLIN 250 MG
1 CAPSULE ORAL 2 TIMES DAILY
Status: DISCONTINUED | OUTPATIENT
Start: 2023-06-11 | End: 2023-06-13 | Stop reason: HOSPADM

## 2023-06-11 RX ORDER — BUPIVACAINE HYDROCHLORIDE 2.5 MG/ML
INJECTION, SOLUTION EPIDURAL; INFILTRATION; INTRACAUDAL
Status: COMPLETED | OUTPATIENT
Start: 2023-06-11 | End: 2023-06-11

## 2023-06-11 RX ORDER — OXYTOCIN/0.9 % SODIUM CHLORIDE 30/500 ML
PLASTIC BAG, INJECTION (ML) INTRAVENOUS CONTINUOUS PRN
Status: DISCONTINUED | OUTPATIENT
Start: 2023-06-11 | End: 2023-06-11

## 2023-06-11 RX ORDER — NALOXONE HYDROCHLORIDE 0.4 MG/ML
0.2 INJECTION, SOLUTION INTRAMUSCULAR; INTRAVENOUS; SUBCUTANEOUS
Status: DISCONTINUED | OUTPATIENT
Start: 2023-06-11 | End: 2023-06-13 | Stop reason: HOSPADM

## 2023-06-11 RX ORDER — OXYTOCIN/0.9 % SODIUM CHLORIDE 30/500 ML
340 PLASTIC BAG, INJECTION (ML) INTRAVENOUS CONTINUOUS PRN
Status: DISCONTINUED | OUTPATIENT
Start: 2023-06-11 | End: 2023-06-11 | Stop reason: HOSPADM

## 2023-06-11 RX ORDER — IBUPROFEN 800 MG/1
800 TABLET, FILM COATED ORAL EVERY 6 HOURS
Status: DISCONTINUED | OUTPATIENT
Start: 2023-06-12 | End: 2023-06-13 | Stop reason: HOSPADM

## 2023-06-11 RX ORDER — DEXTROSE, SODIUM CHLORIDE, SODIUM LACTATE, POTASSIUM CHLORIDE, AND CALCIUM CHLORIDE 5; .6; .31; .03; .02 G/100ML; G/100ML; G/100ML; G/100ML; G/100ML
INJECTION, SOLUTION INTRAVENOUS CONTINUOUS
Status: DISCONTINUED | OUTPATIENT
Start: 2023-06-11 | End: 2023-06-13 | Stop reason: HOSPADM

## 2023-06-11 RX ORDER — BUPIVACAINE HYDROCHLORIDE 7.5 MG/ML
INJECTION, SOLUTION INTRASPINAL
Status: COMPLETED | OUTPATIENT
Start: 2023-06-11 | End: 2023-06-11

## 2023-06-11 RX ORDER — METHYLERGONOVINE MALEATE 0.2 MG/ML
200 INJECTION INTRAVENOUS
Status: DISCONTINUED | OUTPATIENT
Start: 2023-06-11 | End: 2023-06-11 | Stop reason: HOSPADM

## 2023-06-11 RX ORDER — NALOXONE HYDROCHLORIDE 0.4 MG/ML
0.4 INJECTION, SOLUTION INTRAMUSCULAR; INTRAVENOUS; SUBCUTANEOUS
Status: DISCONTINUED | OUTPATIENT
Start: 2023-06-11 | End: 2023-06-13 | Stop reason: HOSPADM

## 2023-06-11 RX ORDER — OXYTOCIN/0.9 % SODIUM CHLORIDE 30/500 ML
100-340 PLASTIC BAG, INJECTION (ML) INTRAVENOUS CONTINUOUS PRN
Status: DISCONTINUED | OUTPATIENT
Start: 2023-06-11 | End: 2023-06-11

## 2023-06-11 RX ORDER — DIPHENHYDRAMINE HCL 25 MG
25 CAPSULE ORAL EVERY 6 HOURS PRN
Status: DISCONTINUED | OUTPATIENT
Start: 2023-06-11 | End: 2023-06-13 | Stop reason: HOSPADM

## 2023-06-11 RX ORDER — OXYTOCIN/0.9 % SODIUM CHLORIDE 30/500 ML
340 PLASTIC BAG, INJECTION (ML) INTRAVENOUS CONTINUOUS PRN
Status: COMPLETED | OUTPATIENT
Start: 2023-06-11 | End: 2023-06-11

## 2023-06-11 RX ORDER — CITRIC ACID/SODIUM CITRATE 334-500MG
30 SOLUTION, ORAL ORAL
Status: COMPLETED | OUTPATIENT
Start: 2023-06-11 | End: 2023-06-11

## 2023-06-11 RX ORDER — OXYTOCIN 10 [USP'U]/ML
10 INJECTION, SOLUTION INTRAMUSCULAR; INTRAVENOUS
Status: DISCONTINUED | OUTPATIENT
Start: 2023-06-11 | End: 2023-06-11

## 2023-06-11 RX ORDER — ONDANSETRON 2 MG/ML
INJECTION INTRAMUSCULAR; INTRAVENOUS PRN
Status: DISCONTINUED | OUTPATIENT
Start: 2023-06-11 | End: 2023-06-11

## 2023-06-11 RX ORDER — BISACODYL 10 MG
10 SUPPOSITORY, RECTAL RECTAL DAILY PRN
Status: DISCONTINUED | OUTPATIENT
Start: 2023-06-13 | End: 2023-06-13 | Stop reason: HOSPADM

## 2023-06-11 RX ORDER — TRANEXAMIC ACID 10 MG/ML
1 INJECTION, SOLUTION INTRAVENOUS EVERY 30 MIN PRN
Status: DISCONTINUED | OUTPATIENT
Start: 2023-06-11 | End: 2023-06-13 | Stop reason: HOSPADM

## 2023-06-11 RX ORDER — CEFOXITIN 2 G/1
2 INJECTION, POWDER, FOR SOLUTION INTRAVENOUS EVERY 6 HOURS
Status: COMPLETED | OUTPATIENT
Start: 2023-06-11 | End: 2023-06-11

## 2023-06-11 RX ORDER — METOCLOPRAMIDE HYDROCHLORIDE 5 MG/ML
10 INJECTION INTRAMUSCULAR; INTRAVENOUS EVERY 6 HOURS PRN
Status: DISCONTINUED | OUTPATIENT
Start: 2023-06-11 | End: 2023-06-13 | Stop reason: HOSPADM

## 2023-06-11 RX ORDER — TRANEXAMIC ACID 10 MG/ML
1 INJECTION, SOLUTION INTRAVENOUS ONCE
Status: DISCONTINUED | OUTPATIENT
Start: 2023-06-11 | End: 2023-06-11 | Stop reason: HOSPADM

## 2023-06-11 RX ORDER — MISOPROSTOL 200 UG/1
400 TABLET ORAL
Status: DISCONTINUED | OUTPATIENT
Start: 2023-06-11 | End: 2023-06-11 | Stop reason: HOSPADM

## 2023-06-11 RX ORDER — VITAMIN A ACETATE, .BETA.-CAROTENE, ASCORBIC ACID, CHOLECALCIFEROL, .ALPHA.-TOCOPHEROL ACETATE, DL-, THIAMINE MONONITRATE, RIBOFLAVIN, NIACINAMIDE, PYRIDOXINE HYDROCHLORIDE, FOLIC ACID, CYANOCOBALAMIN, CALCIUM CARBONATE, FERROUS FUMARATE, ZINC OXIDE, AND CUPRIC OXIDE 2000; 2000; 120; 400; 22; 1.84; 3; 20; 10; 1; 12; 200; 27; 25; 2 [IU]/1; [IU]/1; MG/1; [IU]/1; MG/1; MG/1; MG/1; MG/1; MG/1; MG/1; UG/1; MG/1; MG/1; MG/1; MG/1
TABLET ORAL DAILY
Status: DISCONTINUED | OUTPATIENT
Start: 2023-06-11 | End: 2023-06-13 | Stop reason: HOSPADM

## 2023-06-11 RX ORDER — CITRIC ACID/SODIUM CITRATE 334-500MG
30 SOLUTION, ORAL ORAL ONCE
Status: DISCONTINUED | OUTPATIENT
Start: 2023-06-11 | End: 2023-06-11

## 2023-06-11 RX ORDER — AMOXICILLIN 250 MG
2 CAPSULE ORAL 2 TIMES DAILY
Status: DISCONTINUED | OUTPATIENT
Start: 2023-06-11 | End: 2023-06-13 | Stop reason: HOSPADM

## 2023-06-11 RX ORDER — DEXAMETHASONE SODIUM PHOSPHATE 4 MG/ML
INJECTION, SOLUTION INTRA-ARTICULAR; INTRALESIONAL; INTRAMUSCULAR; INTRAVENOUS; SOFT TISSUE PRN
Status: DISCONTINUED | OUTPATIENT
Start: 2023-06-11 | End: 2023-06-11

## 2023-06-11 RX ORDER — CARBOPROST TROMETHAMINE 250 UG/ML
250 INJECTION, SOLUTION INTRAMUSCULAR
Status: DISCONTINUED | OUTPATIENT
Start: 2023-06-11 | End: 2023-06-13 | Stop reason: HOSPADM

## 2023-06-11 RX ORDER — DIPHENHYDRAMINE HYDROCHLORIDE 50 MG/ML
25 INJECTION INTRAMUSCULAR; INTRAVENOUS EVERY 6 HOURS PRN
Status: DISCONTINUED | OUTPATIENT
Start: 2023-06-11 | End: 2023-06-13 | Stop reason: HOSPADM

## 2023-06-11 RX ORDER — FERROUS SULFATE 325(65) MG
325 TABLET ORAL DAILY
Status: DISCONTINUED | OUTPATIENT
Start: 2023-06-12 | End: 2023-06-13 | Stop reason: HOSPADM

## 2023-06-11 RX ORDER — LIDOCAINE 40 MG/G
CREAM TOPICAL
Status: DISCONTINUED | OUTPATIENT
Start: 2023-06-11 | End: 2023-06-13 | Stop reason: HOSPADM

## 2023-06-11 RX ORDER — ONDANSETRON 2 MG/ML
4 INJECTION INTRAMUSCULAR; INTRAVENOUS EVERY 6 HOURS PRN
Status: DISCONTINUED | OUTPATIENT
Start: 2023-06-11 | End: 2023-06-13 | Stop reason: HOSPADM

## 2023-06-11 RX ORDER — TRANEXAMIC ACID 10 MG/ML
1 INJECTION, SOLUTION INTRAVENOUS EVERY 30 MIN PRN
Status: DISCONTINUED | OUTPATIENT
Start: 2023-06-11 | End: 2023-06-11 | Stop reason: HOSPADM

## 2023-06-11 RX ORDER — CEFAZOLIN SODIUM/WATER 2 G/20 ML
2 SYRINGE (ML) INTRAVENOUS
Status: COMPLETED | OUTPATIENT
Start: 2023-06-11 | End: 2023-06-11

## 2023-06-11 RX ORDER — CARBOPROST TROMETHAMINE 250 UG/ML
250 INJECTION, SOLUTION INTRAMUSCULAR
Status: DISCONTINUED | OUTPATIENT
Start: 2023-06-11 | End: 2023-06-11 | Stop reason: HOSPADM

## 2023-06-11 RX ORDER — NALBUPHINE HYDROCHLORIDE 10 MG/ML
2.5-5 INJECTION, SOLUTION INTRAMUSCULAR; INTRAVENOUS; SUBCUTANEOUS EVERY 6 HOURS PRN
Status: DISCONTINUED | OUTPATIENT
Start: 2023-06-11 | End: 2023-06-11

## 2023-06-11 RX ORDER — MODIFIED LANOLIN
OINTMENT (GRAM) TOPICAL
Status: DISCONTINUED | OUTPATIENT
Start: 2023-06-11 | End: 2023-06-13 | Stop reason: HOSPADM

## 2023-06-11 RX ORDER — METHYLERGONOVINE MALEATE 0.2 MG/ML
200 INJECTION INTRAVENOUS
Status: DISCONTINUED | OUTPATIENT
Start: 2023-06-11 | End: 2023-06-13 | Stop reason: HOSPADM

## 2023-06-11 RX ORDER — METOCLOPRAMIDE 10 MG/1
10 TABLET ORAL EVERY 6 HOURS PRN
Status: DISCONTINUED | OUTPATIENT
Start: 2023-06-11 | End: 2023-06-13 | Stop reason: HOSPADM

## 2023-06-11 RX ORDER — MISOPROSTOL 200 UG/1
400 TABLET ORAL
Status: DISCONTINUED | OUTPATIENT
Start: 2023-06-11 | End: 2023-06-13 | Stop reason: HOSPADM

## 2023-06-11 RX ORDER — EPHEDRINE SULFATE 50 MG/ML
INJECTION, SOLUTION INTRAMUSCULAR; INTRAVENOUS; SUBCUTANEOUS PRN
Status: DISCONTINUED | OUTPATIENT
Start: 2023-06-11 | End: 2023-06-11

## 2023-06-11 RX ORDER — MORPHINE SULFATE 1 MG/ML
100 INJECTION, SOLUTION EPIDURAL; INTRATHECAL; INTRAVENOUS ONCE
Status: DISCONTINUED | OUTPATIENT
Start: 2023-06-11 | End: 2023-06-11

## 2023-06-11 RX ORDER — CEFAZOLIN SODIUM/WATER 2 G/20 ML
2 SYRINGE (ML) INTRAVENOUS SEE ADMIN INSTRUCTIONS
Status: DISCONTINUED | OUTPATIENT
Start: 2023-06-11 | End: 2023-06-11 | Stop reason: HOSPADM

## 2023-06-11 RX ORDER — ALBUTEROL SULFATE 0.83 MG/ML
2.5 SOLUTION RESPIRATORY (INHALATION) EVERY 4 HOURS PRN
Status: DISCONTINUED | OUTPATIENT
Start: 2023-06-11 | End: 2023-06-11 | Stop reason: HOSPADM

## 2023-06-11 RX ORDER — HYDROCORTISONE 25 MG/G
CREAM TOPICAL 3 TIMES DAILY PRN
Status: DISCONTINUED | OUTPATIENT
Start: 2023-06-11 | End: 2023-06-13 | Stop reason: HOSPADM

## 2023-06-11 RX ORDER — FENTANYL CITRATE-0.9 % NACL/PF 10 MCG/ML
100 PLASTIC BAG, INJECTION (ML) INTRAVENOUS EVERY 5 MIN PRN
Status: DISCONTINUED | OUTPATIENT
Start: 2023-06-11 | End: 2023-06-11

## 2023-06-11 RX ORDER — METHYLERGONOVINE MALEATE 0.2 MG/ML
INJECTION INTRAVENOUS PRN
Status: DISCONTINUED | OUTPATIENT
Start: 2023-06-11 | End: 2023-06-11

## 2023-06-11 RX ORDER — ONDANSETRON 4 MG/1
4 TABLET, ORALLY DISINTEGRATING ORAL EVERY 30 MIN PRN
Status: DISCONTINUED | OUTPATIENT
Start: 2023-06-11 | End: 2023-06-11 | Stop reason: HOSPADM

## 2023-06-11 RX ORDER — ONDANSETRON 4 MG/1
4 TABLET, ORALLY DISINTEGRATING ORAL EVERY 6 HOURS PRN
Status: DISCONTINUED | OUTPATIENT
Start: 2023-06-11 | End: 2023-06-13 | Stop reason: HOSPADM

## 2023-06-11 RX ORDER — ACETAMINOPHEN 325 MG/1
975 TABLET ORAL ONCE
Status: COMPLETED | OUTPATIENT
Start: 2023-06-11 | End: 2023-06-11

## 2023-06-11 RX ORDER — ACETAMINOPHEN 325 MG/1
975 TABLET ORAL EVERY 6 HOURS
Status: DISCONTINUED | OUTPATIENT
Start: 2023-06-11 | End: 2023-06-13 | Stop reason: HOSPADM

## 2023-06-11 RX ORDER — ONDANSETRON 2 MG/ML
4 INJECTION INTRAMUSCULAR; INTRAVENOUS EVERY 30 MIN PRN
Status: DISCONTINUED | OUTPATIENT
Start: 2023-06-11 | End: 2023-06-11 | Stop reason: HOSPADM

## 2023-06-11 RX ADMIN — DOCUSATE SODIUM 50MG AND SENNOSIDES 8.6MG 1 TABLET: 8.6; 5 TABLET, FILM COATED ORAL at 21:18

## 2023-06-11 RX ADMIN — ACETAMINOPHEN 975 MG: 325 TABLET, FILM COATED ORAL at 12:42

## 2023-06-11 RX ADMIN — BUPIVACAINE 20 ML: 13.3 INJECTION, SUSPENSION, LIPOSOMAL INFILTRATION at 08:16

## 2023-06-11 RX ADMIN — Medication 2 G: at 06:54

## 2023-06-11 RX ADMIN — PHENYLEPHRINE HYDROCHLORIDE 50 MCG: 10 INJECTION INTRAVENOUS at 08:01

## 2023-06-11 RX ADMIN — Medication 2.5 MG: at 08:01

## 2023-06-11 RX ADMIN — ACETAMINOPHEN 975 MG: 325 TABLET, FILM COATED ORAL at 06:29

## 2023-06-11 RX ADMIN — METHYLERGONOVINE MALEATE 200 MCG: 0.2 INJECTION INTRAVENOUS at 07:49

## 2023-06-11 RX ADMIN — PHENYLEPHRINE HYDROCHLORIDE 100 MCG: 10 INJECTION INTRAVENOUS at 07:22

## 2023-06-11 RX ADMIN — SODIUM CITRATE AND CITRIC ACID MONOHYDRATE 30 ML: 500; 334 SOLUTION ORAL at 07:00

## 2023-06-11 RX ADMIN — PHENYLEPHRINE HYDROCHLORIDE 50 MCG: 10 INJECTION INTRAVENOUS at 07:54

## 2023-06-11 RX ADMIN — SODIUM CHLORIDE, POTASSIUM CHLORIDE, SODIUM LACTATE AND CALCIUM CHLORIDE: 600; 310; 30; 20 INJECTION, SOLUTION INTRAVENOUS at 06:17

## 2023-06-11 RX ADMIN — Medication 5 MG: at 07:35

## 2023-06-11 RX ADMIN — Medication 100 ML/HR: at 13:15

## 2023-06-11 RX ADMIN — BUPIVACAINE HYDROCHLORIDE IN DEXTROSE 1.6 ML: 7.5 INJECTION, SOLUTION SUBARACHNOID at 07:17

## 2023-06-11 RX ADMIN — SODIUM CHLORIDE, POTASSIUM CHLORIDE, SODIUM LACTATE AND CALCIUM CHLORIDE: 600; 310; 30; 20 INJECTION, SOLUTION INTRAVENOUS at 06:44

## 2023-06-11 RX ADMIN — Medication 300 ML/HR: at 07:45

## 2023-06-11 RX ADMIN — Medication 2.5 MG: at 07:58

## 2023-06-11 RX ADMIN — Medication 2.5 MG: at 07:37

## 2023-06-11 RX ADMIN — CEFOXITIN SODIUM 2 G: 2 POWDER, FOR SOLUTION INTRAVENOUS at 19:36

## 2023-06-11 RX ADMIN — DEXAMETHASONE SODIUM PHOSPHATE 8 MG: 4 INJECTION, SOLUTION INTRA-ARTICULAR; INTRALESIONAL; INTRAMUSCULAR; INTRAVENOUS; SOFT TISSUE at 07:45

## 2023-06-11 RX ADMIN — PHENYLEPHRINE HYDROCHLORIDE 100 MCG: 10 INJECTION INTRAVENOUS at 07:29

## 2023-06-11 RX ADMIN — HYDROMORPHONE HYDROCHLORIDE 100 MCG: 4 INJECTION, SOLUTION INTRAMUSCULAR; INTRAVENOUS; SUBCUTANEOUS at 07:17

## 2023-06-11 RX ADMIN — PHENYLEPHRINE HYDROCHLORIDE 100 MCG: 10 INJECTION INTRAVENOUS at 07:35

## 2023-06-11 RX ADMIN — SODIUM CHLORIDE, POTASSIUM CHLORIDE, SODIUM LACTATE AND CALCIUM CHLORIDE: 600; 310; 30; 20 INJECTION, SOLUTION INTRAVENOUS at 07:29

## 2023-06-11 RX ADMIN — KETOROLAC TROMETHAMINE 30 MG: 30 INJECTION, SOLUTION INTRAMUSCULAR at 08:43

## 2023-06-11 RX ADMIN — Medication 2.5 MG: at 07:54

## 2023-06-11 RX ADMIN — SODIUM CHLORIDE, SODIUM LACTATE, POTASSIUM CHLORIDE, CALCIUM CHLORIDE AND DEXTROSE MONOHYDRATE: 5; 600; 310; 30; 20 INJECTION, SOLUTION INTRAVENOUS at 10:20

## 2023-06-11 RX ADMIN — DOCUSATE SODIUM 50MG AND SENNOSIDES 8.6MG 1 TABLET: 8.6; 5 TABLET, FILM COATED ORAL at 12:43

## 2023-06-11 RX ADMIN — KETOROLAC TROMETHAMINE 30 MG: 30 INJECTION, SOLUTION INTRAMUSCULAR; INTRAVENOUS at 15:38

## 2023-06-11 RX ADMIN — PHENYLEPHRINE HYDROCHLORIDE 50 MCG: 10 INJECTION INTRAVENOUS at 07:58

## 2023-06-11 RX ADMIN — ONDANSETRON 4 MG: 2 INJECTION INTRAMUSCULAR; INTRAVENOUS at 07:11

## 2023-06-11 RX ADMIN — PRENATAL VITAMINS-IRON FUMARATE 27 MG IRON-FOLIC ACID 0.8 MG TABLET 1 TABLET: at 12:42

## 2023-06-11 RX ADMIN — TRANEXAMIC ACID 1 G: 1 INJECTION, SOLUTION INTRAVENOUS at 07:18

## 2023-06-11 RX ADMIN — KETOROLAC TROMETHAMINE 30 MG: 30 INJECTION, SOLUTION INTRAMUSCULAR; INTRAVENOUS at 21:19

## 2023-06-11 RX ADMIN — ACETAMINOPHEN 975 MG: 325 TABLET, FILM COATED ORAL at 19:36

## 2023-06-11 RX ADMIN — BUPIVACAINE HYDROCHLORIDE 40 ML: 2.5 INJECTION, SOLUTION EPIDURAL; INFILTRATION; INTRACAUDAL at 08:16

## 2023-06-11 RX ADMIN — CEFOXITIN SODIUM 2 G: 2 POWDER, FOR SOLUTION INTRAVENOUS at 12:41

## 2023-06-11 ASSESSMENT — ACTIVITIES OF DAILY LIVING (ADL)
ADLS_ACUITY_SCORE: 20
ADLS_ACUITY_SCORE: 20
ADLS_ACUITY_SCORE: 24
ADLS_ACUITY_SCORE: 28
ADLS_ACUITY_SCORE: 20
ADLS_ACUITY_SCORE: 20
ADLS_ACUITY_SCORE: 24
ADLS_ACUITY_SCORE: 20
ADLS_ACUITY_SCORE: 24
ADLS_ACUITY_SCORE: 20
ADLS_ACUITY_SCORE: 24

## 2023-06-11 NOTE — H&P
[facility] Labor and Delivery History and Physical    Candis Marcos MRN# [id number]   Age: 27 year old YOB: 1995     Date of Admission:  2023    Primary care provider: Tess Tsang           Chief Complaint:   Candis Marcos is a 27 year old female who is 39w2d pregnant and being admitted for suspected  SROM. Previous CD, desires repeat.  Prenatal record/H and P reviewed with patient and unchanged.  GDM:  Diet with good control.           Pregnancy history:     OBSTETRIC HISTORY:    OB History    Para Term  AB Living   2 1 1 0 0 1   SAB IAB Ectopic Multiple Live Births   0 0 0 0 1      # Outcome Date GA Lbr Bar/2nd Weight Sex Delivery Anes PTL Lv   2 Current            1 Term 22 40w1d 12:08 / 03:43 3.895 kg (8 lb 9.4 oz) F CS-LTranv EPI, Spinal N GARDENIA      Name: Lori      Apgar1: 9  Apgar5: 9       EDC: Estimated Date of Delivery: 2023    Prenatal Labs:   Lab Results   Component Value Date    ABO A 2021    RH Pos 2021    AS Negative 2023    HEPBANG Nonreactive 2022    HGB 10.5 (L) 2023       GBS Status:   No results found for: GBS    Active Problem List  Patient Active Problem List   Diagnosis     Normal pregnancy in multigravida in second trimester     Previous  delivery, antepartum condition or complication       Medication Prior to Admission  Medications Prior to Admission   Medication Sig Dispense Refill Last Dose     acetaminophen (TYLENOL) 500 MG tablet Take 500-1,000 mg by mouth every 6 hours as needed for mild pain   More than a month     blood glucose (NO BRAND SPECIFIED) lancets standard Use to test blood sugar 4 times daily: Fasting in the morning and 1 hour after eating. 200 lancet. 2 6/10/2023     blood glucose (NO BRAND SPECIFIED) test strip Use to test blood sugar 4 times daily: Fasting in the morning and 1 hour after eating. 150 strip 2 6/10/2023     blood glucose monitoring (NO  BRAND SPECIFIED) meter device kit Use to test blood sugar 4 times daily: Fasting in the morning and 1 hour after eating. 1 kit 0 6/10/2023     Prenatal Vit-Fe Fumarate-FA (PRENATAL VITAMINS PO)    6/10/2023   .        Maternal Past Medical History:     Past Medical History:   Diagnosis Date     Anemia of pregnancy, third trimester 12/15/2021    Iron 325 mg daily     Infection due to 2019 novel coronavirus 1/13/2022                       Family History:   Unchanged from prenatal record            Social History:   Unchanged from prenatal record         Review of Systems:   Per HPI.  Other systems reviewed and negative         Physical Exam:     Vitals:    06/11/23 0200   BP: 122/76   BP Location: Left arm   Patient Position: Sitting   Cuff Size: Adult Regular   Resp: 16   Temp: 98.4  F (36.9  C)   TempSrc: Oral   SpO2: 100%     Chest: CTA  CV:  RRR without murmers  General:  Alert and oriented  Abdomen soft, non-tender, gravid  Ext: neg  Cervix:   Membranes: SROM   Dilation: 2   Effacement: 60%   Station:-2     Presentation:Cephalic  Fetal Heart Rate Tracing: reactive and reassuring  Tocometer: external monitor and irregular ctx.                        Assessment:   Candis Marcos is a 39w2d pregnant female admitted with suspected  SROM. Previous CD, desires repeat..          Plan:   Plan repeat CD.   Preop previously done.   All questions were answered.   Consent form was reviewed and signed.  Pt desires to proceed.     MD Ethan Gan MD

## 2023-06-11 NOTE — PLAN OF CARE
Transfer to OR & C/S Delivery Note  Patient to OR at 0710 via ambulation.   Delivered viable Female at 0744 via repeat  section by Dr. Mesa.    To warmer, stimulated and dried by OB RN, RT and MD. Babe deep suctioned via nose and mouth at approximately 7-8 minutes of life due to nasal flaring and mild grunting.   Dr. Cameron present at delivery to care for baby.   ID bands placed on baby, mother and dad.   Brought to mother and placed skin-to-skin with RN in attendance.

## 2023-06-11 NOTE — PLAN OF CARE
Face to face report given with opportunity to observe patient.    Report given to Janet Cisneros RN   6/11/2023  7:13 AM

## 2023-06-11 NOTE — ANESTHESIA PREPROCEDURE EVALUATION
Anesthesia Pre-Procedure Evaluation    Patient: Candis Marcos   MRN: [id number] : 1995        Procedure : Procedure(s):   SECTION          Past Medical History:   Diagnosis Date     Anemia of pregnancy, third trimester 12/15/2021    Iron 325 mg daily     Infection due to 2019 novel coronavirus 2022      Past Surgical History:   Procedure Laterality Date      SECTION N/A 3/9/2022    Procedure: PRIMARY  SECTION;  Surgeon: Fredrick Severino MD;  Location: [facility] OR      No Known Allergies   Social History     Tobacco Use     Smoking status: Never     Smokeless tobacco: Never   Vaping Use     Vaping status: Not on file   Substance Use Topics     Alcohol use: No      Wt Readings from Last 1 Encounters:   23 79.8 kg (176 lb)        Anesthesia Evaluation   Pt has had prior anesthetic.     No history of anesthetic complications       ROS/MED HX  ENT/Pulmonary:  - neg pulmonary ROS     Neurologic:       Cardiovascular:  - neg cardiovascular ROS     METS/Exercise Tolerance: >4 METS    Hematologic:       Musculoskeletal:  - neg musculoskeletal ROS     GI/Hepatic:     (+) GERD,     Renal/Genitourinary:  - neg Renal ROS     Endo:  - neg endo ROS     Psychiatric/Substance Use:  - neg psychiatric ROS     Infectious Disease:       Malignancy:  - neg malignancy ROS     Other:      (+) Possibly pregnant, ,         Physical Exam    Airway        Mallampati: I   TM distance: > 3 FB   Neck ROM: full   Mouth opening: > 3 cm    Respiratory Devices and Support         Dental       (+) Completely normal teeth      Cardiovascular   cardiovascular exam normal       Rhythm and rate: regular and normal     Pulmonary   pulmonary exam normal        breath sounds clear to auscultation           OUTSIDE LABS:  CBC:   Lab Results   Component Value Date    WBC 10.8 2023    WBC 10.8 2023    HGB 10.5 (L) 2023    HGB 11.0 (L) 2023    HCT 33.1 (L) 2023    HCT 33.8 (L) 2023      06/11/2023     04/04/2023     BMP:   Lab Results   Component Value Date     03/09/2022     12/24/2014    POTASSIUM 4.0 03/09/2022    POTASSIUM 3.6 12/24/2014    CHLORIDE 106 03/09/2022    CHLORIDE 102 12/24/2014    CO2 20 03/09/2022    CO2 29 12/24/2014    BUN 8 03/09/2022    BUN 13 12/24/2014    CR 0.69 03/09/2022    CR 0.77 12/24/2014     (H) 03/09/2022    GLC 89 12/24/2014     COAGS:   Lab Results   Component Value Date    INR 0.97 05/10/2014     POC:   Lab Results   Component Value Date    HCG Negative 12/24/2014     HEPATIC:   Lab Results   Component Value Date    ALBUMIN 3.1 (L) 03/09/2022    PROTTOTAL 7.2 03/09/2022    ALT 24 03/09/2022    AST 16 03/09/2022    ALKPHOS 135 03/09/2022    BILITOTAL 0.4 03/09/2022     OTHER:   Lab Results   Component Value Date    CLIVE 9.4 03/09/2022    LIPASE 220 05/10/2014    TSH 2.02 12/24/2014    CRP 10.2 (H) 05/10/2014       Anesthesia Plan    ASA Status:  2, emergent    NPO Status:  NPO Appropriate    Anesthesia Type: Spinal.      Maintenance: Balanced.        Consents    Anesthesia Plan(s) and associated risks, benefits, and realistic alternatives discussed. Questions answered and patient/representative(s) expressed understanding.     - Discussed: Risks, Benefits and Alternatives for BOTH SEDATION and the PROCEDURE were discussed     - Discussed with:  Patient      - Extended Intubation/Ventilatory Support Discussed: No.      - Patient is DNR/DNI Status: No    Use of blood products discussed: Yes (per surgeon).     Postoperative Care    Pain management: intrathecal morphine, Neuraxial analgesia, Peripheral nerve block (Single Shot).   PONV prophylaxis: Ondansetron (or other 5HT-3), Dexamethasone or Solumedrol     Comments:                GREGORY Rizzo CRNA

## 2023-06-11 NOTE — OP NOTE
Section Operative Note  [facility]    Pre-operative diagnosis: Intrauterine pregnancy at 39 2/7 weeks gestation  Repeat  section   Post-operative diagnosis: Same   Procedure: Repeat low transverse  section   Surgeon: Ethan Mesa MD   Assistant(s): None   Anesthesia: Spinal anesthesia and Konrad block   Estimated blood loss: See QBL   Total IV fluids: (See anesthesia record)   Blood transfusion: No transfusion was given during surgery   Total urine output: (See anesthesia record)   Drains: Lizarraga catheter   Specimens: placenta discarded   Findings: Vigorous  female.  Apgar's 9/9, intact placenta with 3VC, nml pelvic anatomy   Complications: None   Condition: Mother stable, transfered to post-anesthesia recovery     Procedure Details:  The risks, benefits, complications, treatment options, and expected outcomes were discussed with the patient.  The patient concurred with the proposed plan, giving informed consent.  The patient was taken to Operating Room,  identity confirmed and the procedure verified as  Delivery. A Time Out was held and the above information confirmed.    After uneventful anesthesia placement the patient was prepped and draped in the left lateral tilt  position and a lizarraga catheter placed.   A low-transverse skin incision was made with a scalpel and carried down through the subcutaneous tissue to the fascia which was extended transversely. The fascia was  from the underlying rectus tissue superiorly and inferiorly. The peritoneum was identified and entered without difficulty. The utero-vesical peritoneal reflection was incised transversely and the bladder flap was bluntly freed from the lower uterine segment. A low transverse uterine incision was made.  The infant was delivered from the vtx presentation.  After the umbilical cord was clamped and cut the infant was suctioned and handed to the awaiting resuscitation team.  Cord blood  was obtained for evaluation. The placenta was removed intact and the uterus swept free of membranes and debris. The uterine incision was closed with running locked sutures of 1.0 Chromic in a 2 layer fashion.  Hemostasis was excellent. Irrigation with warm normal saline was carried out until clear.The rectus muscles were re approximated with running 0 Vicryl.   The fascia was then reapproximated with running sutures of 0 PDS. The subcutaneous space was irrigated and checked for hemostasis.  The skin was reapproximated with running subcuticular  3.0 Monocryl and surgical glue.   A vaginal exam was performed at the end of the procedure to evacuate the lower uterine segment and vagina of clots.  There were no complications and the patient was transferred to the recovery room in excellent and stable condition.    Instrument, sponge, and needle counts were correct prior the abdominal closure and at the conclusion of the case.         Ethan Mesa MD

## 2023-06-11 NOTE — PLAN OF CARE
Scheduled AM C/S Admit Note  Candis Marcos  MRN: [id number]  Gestational Age: 39w2d      Candis Marcos presents for scheduled  section for repeat.  Patient denies contractions or bleeding. Reports continual LOF. ROM + sent.     Past Medical History:   Diagnosis Date     Anemia of pregnancy, third trimester 12/15/2021    Iron 325 mg daily     Infection due to 2019 novel coronavirus 2022     Past Surgical History:   Procedure Laterality Date      SECTION N/A 3/9/2022    Procedure: PRIMARY  SECTION;  Surgeon: Fredrick Severino MD;  Location: [facility] OR           FHT: 120  NST: Reactive .    Plan:  - section at 0700.    Patient ambulated to bed via self.. Patient is alert and oriented X 3, denies any pain. pain. Patient oriented to room, unit, hourly rounding, and plan of care. Call light within reach.  Explained admission packet with patient bill of rights brochure. Will continue to monitor and document as needed.     Inpatient nursing criteria listed below was met:    Health care directives status obtained and documented: Yes  Patient identifies a surrogate decision maker: Yes   If yes, who:Brody Contact Information:on whiteboard  Core Measure diagnosis present:: No  Vaccine assessment done and vaccines ordered if appropriate. Yes  Clergy visit ordered if patient requests: N/A  Skin issues/needs documented:N/A  Isolation needs addressed, if appropriate: N/A  Fall Prevention (Med and High risk): Care plan updated, Education given and documented and signage used: Yes  Care Plan initiated: Yes  Education Documented (Reminder to educate patient if MRSA is present on admission): Yes  Education Assessment documented:Yes  Patient has discharge needs (If yes, please explain): No

## 2023-06-11 NOTE — OR NURSING
Patient arrives to OB via stretcher.  Patient is awake/alert and holding baby.  Patient's color pink, dry and intact.  Bedside report received from CRNA and OR nurse.  Patient transferred to OB bed.  Linens cleaned up.  Patient had small amounts of bleeding noted to back of anne-pad and on the bottom of her abdominal binder.  Patient and bedding cleaned up/changed.  Patient placed on pulse oximetry, bp monitor and cardiac monitor.  Rest of nursing assessment as charted.

## 2023-06-11 NOTE — OR NURSING
Patient remains awake/alert and interactive.  Skin is pink, warm, dry and intact.  Last check completed with OB RN Simin; fundus is at the level of the umbilicus; firm; light/minimal bleeding noted to anne-pad; some bleeding noted with fundal massage.  Patient was assisted in rolling and cleaned up.  Linens/peripad collected for QBL.  Patient denies any pain.  Incision is intact without any drainage.  VSS.  Bedside report given.  Care to OB RN.

## 2023-06-11 NOTE — ANESTHESIA CARE TRANSFER NOTE
Patient: Candis Marcos    Procedure: Procedure(s):   SECTION viable female at 0744 apgars 9/9       Diagnosis: Hx of  section [Z98.891]  Diagnosis Additional Information: No value filed.    Anesthesia Type:   Spinal     Note:    Oropharynx: oropharynx clear of all foreign objects  Level of Consciousness: awake  Oxygen Supplementation: room air    Independent Airway: airway patency satisfactory and stable  Dentition: dentition unchanged  Vital Signs Stable: post-procedure vital signs reviewed and stable  Report to RN Given: handoff report given  Patient transferred to: Labor and Delivery    Handoff Report: Identifed the Patient, Identified the Reponsible Provider, Reviewed the pertinent medical history, Discussed the surgical course, Reviewed Intra-OP anesthesia mangement and issues during anesthesia, Set expectations for post-procedure period and Allowed opportunity for questions and acknowledgement of understanding      Vitals:  Vitals Value Taken Time   /74 23 0839   Temp     Pulse 57 23 0844   Resp 10 23 0844   SpO2 97 % 23 0844   Vitals shown include unvalidated device data.    Electronically Signed By: GREGORY Rizzo CRNA  2023  8:45 AM

## 2023-06-11 NOTE — PLAN OF CARE
Assumed care of pt from PACU RN. Pt is s/p repeat  via Dr. Mesa. Pt is alert and responding appropriately. Denies pain. VSS. IV fluid and Oxytocin infusing. Low transverse surgical incision approximated and without signs/symptoms of infection. No drainage. Fundus firm, midline and U/U. Lochia light and rubra in color. Byrd catheter patent and draining willi, clear urine. Remainder of assessment as charted.

## 2023-06-11 NOTE — ANESTHESIA PROCEDURE NOTES
TAP Procedure Note    Pre-Procedure   Staff -        CRNA: Jacque Feldman APRN CRNA       Performed By: CRNA       Location: OR       Procedure Start/Stop Times: 6/11/2023 8:16 AM and 6/11/2023 8:21 AM       Pre-Anesthestic Checklist: patient identified, IV checked, site marked, risks and benefits discussed, informed consent, monitors and equipment checked, pre-op evaluation, at physician/surgeon's request and post-op pain management  Timeout:       Correct Patient: Yes        Correct Procedure: Yes        Correct Site: Yes        Correct Position: Yes        Correct Laterality: Yes        Site Marked: Yes  Procedure Documentation  Procedure: TAP       Laterality: bilateral       Patient Position: supine       Skin prep: Chloraprep       Needle Type: insulated and short bevel       Needle Gauge: 20.        Needle Length (Inches): 4        Ultrasound guided       1. Ultrasound was used to identify targeted nerve, plexus, vascular marker, or fascial plane and place a needle adjacent to it in real-time.       2. Ultrasound was used to visualize the spread of anesthetic in close proximity to the above referenced structure.       3. A permanent image is entered into the patient's record.       4. The visualized anatomic structures appeared normal.       5. There were no apparent abnormal pathologic findings.    Assessment/Narrative         The placement was negative for: blood aspirated, painful injection and site bleeding       Paresthesias: No.       Bolus given via needle..        Secured via.        Insertion/Infusion Method: Single Shot       Complications: none       Injection made incrementally with aspirations every 5 mL.    Medication(s) Administered   Bupivacaine 0.25% PF (Infiltration) - Infiltration   40 mL - 6/11/2023 8:16:00 AM  Bupivacaine liposome (Exparel) 1.3% LA inj susp (Infiltration) - Infiltration   20 mL - 6/11/2023 8:16:00 AM  Medication Administration Time: 6/11/2023 8:16 AM      FOR [facility]  "([facility]) ONLY:   Pain Team Contact information: please page the Pain Team Via [employer]. Search \"Pain\". During daytime hours, please page the attending first. At night please page the resident first.      "

## 2023-06-11 NOTE — PRE-PROCEDURE INSTRUCTIONS
Scheduled AM C/S Admit Note  Candis Marcos  MRN: [id number]  Gestational Age: 39w2d      Candis Marcos presents for scheduled  section for repeat.  Patient denies contractions, or bleeding. Report LOF. ROM + sent.     Past Medical History:   Diagnosis Date     Anemia of pregnancy, third trimester 12/15/2021    Iron 325 mg daily     Infection due to 2019 novel coronavirus 2022     Past Surgical History:   Procedure Laterality Date      SECTION N/A 3/9/2022    Procedure: PRIMARY  SECTION;  Surgeon: Fredrick Severino MD;  Location: [facility] OR           FHT: 120  NST: Reactive .    Plan:  - section at 0700.    Patient ambulated to bed via self.. Patient is alert and oriented X 3, denies any pain. pain. Patient oriented to room, unit, hourly rounding, and plan of care. Call light within reach.  Explained admission packet with patient bill of rights brochure. Will continue to monitor and document as needed.     Inpatient nursing criteria listed below was met:    Health care directives status obtained and documented: Yes  Patient identifies a surrogate decision maker: Yes   If yes, who:Brody Contact Information: on whiteboard  Core Measure diagnosis present:: No  Vaccine assessment done and vaccines ordered if appropriate. Yes  Clergy visit ordered if patient requests: N/A  Skin issues/needs documented:N/A  Isolation needs addressed, if appropriate: N/A  Fall Prevention (Med and High risk): Care plan updated, Education given and documented and signage used: Yes  Care Plan initiated: Yes  Education Documented (Reminder to educate patient if MRSA is present on admission): Yes  Education Assessment documented:Yes  Patient has discharge needs (If yes, please explain): No

## 2023-06-11 NOTE — PLAN OF CARE
Pt arrived to the unit with reports of continual leaking of fluid. ROM plus sent. Cervix is 2 cm, 60% effaced and posterior. Pt reports that she is not feeling any contractions at this time. Dr. Mesa updated and the plan is to schedule her c/s for 0700. VSS. FHT reassuring.

## 2023-06-11 NOTE — ANESTHESIA POSTPROCEDURE EVALUATION
Patient: Candis Marcos    Procedure: Procedure(s):   SECTION viable female at 0744 apgars 9/9       Anesthesia Type:  Spinal    Note:  Disposition: Inpatient   Postop Pain Control: Uneventful            Sign Out: Well controlled pain   PONV: No   Neuro/Psych: Uneventful            Sign Out: Acceptable/Baseline neuro status   Airway/Respiratory: Uneventful            Sign Out: Acceptable/Baseline resp. status   CV/Hemodynamics: Uneventful            Sign Out: Acceptable CV status; No obvious hypovolemia; No obvious fluid overload   Other NRE: NONE   DID A NON-ROUTINE EVENT OCCUR? No           Last vitals:  Vitals Value Taken Time   /74 23 0850   Temp     Pulse 60 23 0855   Resp 32 23 0855   SpO2 100 % 23 0855   Vitals shown include unvalidated device data.    Electronically Signed By: GREGORY Rizzo CRNA  2023  8:56 AM

## 2023-06-11 NOTE — ANESTHESIA PROCEDURE NOTES
"Intrathecal injection Procedure Note    Pre-Procedure   Staff -        CRNA: Jacque Feldman APRN CRNA       Performed By: CRNA       Procedure Start/Stop Times: 6/11/2023 7:12 AM and 6/11/2023 7:17 AM       Pre-Anesthestic Checklist: patient identified, IV checked, risks and benefits discussed, informed consent, monitors and equipment checked, pre-op evaluation, at physician/surgeon's request and post-op pain management  Timeout:       Correct Patient: Yes        Correct Procedure: Yes        Correct Site: Yes        Correct Position: Yes   Procedure Documentation  Procedure: intrathecal injection       Patient Position: sitting       Patient Prep/Sterile Barriers: sterile gloves, mask, patient draped       Skin prep: Chloraprep (midline approach).       Needle Gauge: 25.        Needle Length (Inches): 3.5        Introducer used    Assessment/Narrative         Paresthesias: No.       CSF fluid: clear.    Medication(s) Administered   0.75% Hyperbaric Bupivacaine (Intrathecal) - Intrathecal   1.6 mL - 6/11/2023 7:17:00 AM  Medication Administration Time: 6/11/2023 7:12 AM      FOR [facility] ([facility]) ONLY:   Pain Team Contact information: please page the Pain Team Via [employer]. Search \"Pain\". During daytime hours, please page the attending first. At night please page the resident first.      "

## 2023-06-12 PROBLEM — O24.410 DIET CONTROLLED GESTATIONAL DIABETES MELLITUS (GDM) IN THIRD TRIMESTER: Status: ACTIVE | Noted: 2023-04-14

## 2023-06-12 PROBLEM — O34.219 PREVIOUS CESAREAN DELIVERY, ANTEPARTUM CONDITION OR COMPLICATION: Status: ACTIVE | Noted: 2022-12-09

## 2023-06-12 PROBLEM — Z34.82 NORMAL PREGNANCY IN MULTIGRAVIDA IN SECOND TRIMESTER: Status: ACTIVE | Noted: 2022-12-09

## 2023-06-12 LAB
HGB BLD-MCNC: 9.7 G/DL (ref 11.7–15.7)
HOLD SPECIMEN: NORMAL

## 2023-06-12 PROCEDURE — 85018 HEMOGLOBIN: CPT | Performed by: OBSTETRICS & GYNECOLOGY

## 2023-06-12 PROCEDURE — 120N000001 HC R&B MED SURG/OB

## 2023-06-12 PROCEDURE — 36415 COLL VENOUS BLD VENIPUNCTURE: CPT | Performed by: OBSTETRICS & GYNECOLOGY

## 2023-06-12 PROCEDURE — 250N000011 HC RX IP 250 OP 636: Performed by: OBSTETRICS & GYNECOLOGY

## 2023-06-12 PROCEDURE — 250N000013 HC RX MED GY IP 250 OP 250 PS 637: Performed by: OBSTETRICS & GYNECOLOGY

## 2023-06-12 RX ADMIN — OXYCODONE HYDROCHLORIDE 5 MG: 5 TABLET ORAL at 22:36

## 2023-06-12 RX ADMIN — ACETAMINOPHEN 975 MG: 325 TABLET, FILM COATED ORAL at 14:15

## 2023-06-12 RX ADMIN — KETOROLAC TROMETHAMINE 30 MG: 30 INJECTION, SOLUTION INTRAMUSCULAR; INTRAVENOUS at 04:08

## 2023-06-12 RX ADMIN — OXYCODONE HYDROCHLORIDE 5 MG: 5 TABLET ORAL at 18:04

## 2023-06-12 RX ADMIN — OXYCODONE HYDROCHLORIDE 5 MG: 5 TABLET ORAL at 12:11

## 2023-06-12 RX ADMIN — PRENATAL VITAMINS-IRON FUMARATE 27 MG IRON-FOLIC ACID 0.8 MG TABLET 1 TABLET: at 08:57

## 2023-06-12 RX ADMIN — FERROUS SULFATE TAB 325 MG (65 MG ELEMENTAL FE) 325 MG: 325 (65 FE) TAB at 09:01

## 2023-06-12 RX ADMIN — ACETAMINOPHEN 975 MG: 325 TABLET, FILM COATED ORAL at 07:43

## 2023-06-12 RX ADMIN — ACETAMINOPHEN 975 MG: 325 TABLET, FILM COATED ORAL at 19:45

## 2023-06-12 RX ADMIN — OXYCODONE HYDROCHLORIDE 5 MG: 5 TABLET ORAL at 06:16

## 2023-06-12 RX ADMIN — DOCUSATE SODIUM 50MG AND SENNOSIDES 8.6MG 1 TABLET: 8.6; 5 TABLET, FILM COATED ORAL at 21:32

## 2023-06-12 RX ADMIN — DOCUSATE SODIUM 50MG AND SENNOSIDES 8.6MG 2 TABLET: 8.6; 5 TABLET, FILM COATED ORAL at 08:57

## 2023-06-12 RX ADMIN — IBUPROFEN 800 MG: 800 TABLET, FILM COATED ORAL at 12:11

## 2023-06-12 RX ADMIN — ACETAMINOPHEN 975 MG: 325 TABLET, FILM COATED ORAL at 01:21

## 2023-06-12 RX ADMIN — IBUPROFEN 800 MG: 800 TABLET, FILM COATED ORAL at 18:04

## 2023-06-12 ASSESSMENT — ACTIVITIES OF DAILY LIVING (ADL)
ADLS_ACUITY_SCORE: 20

## 2023-06-12 NOTE — PLAN OF CARE
Data: Vital signs within normal limits. Patient denies pain. Postpartum checks within normal limits - see flow record. Patient eating and drinking normally. Patient ambulated to the bathroom with SBA-2 at approximately 1600. Byrd catheter discontinued at this time. Patient DTV. Low transverse surgical incision to the abdomen remains well approximated. No apparent signs of infection. Remainder of assessment as charted.  Action: Patient receiving schedule Toradol and Ibuprofen for pain management. See flow record.  Response: Positive attachment behaviors observed with infant. Support persons (Jake-spouse) present.

## 2023-06-12 NOTE — PLAN OF CARE
Face to face report given with opportunity to observe patient.    Report given to Janet Hamilton RN   6/12/2023  7:18 AM

## 2023-06-12 NOTE — PLAN OF CARE
GData: Vital signs within normal limits. Postpartum checks within normal limits - see flow record. Patient eating and drinking normally. Patient able to empty bladder independently and is up ambulating. No apparent signs of infection. Incision healing well. Patient performing self cares and is able to care for infant.  Action: Patient medicated during the shift for pain. See MAR. Patient reassessed within 1 hour after each medication and pain was improved - patient stated she was comfortable. Patient education done about pain management and treatment plan. See flow record.  Response: Positive attachment behaviors observed with infant. Support persons, Jake (spouse), present.   Plan: Anticipate discharge on POD #2.

## 2023-06-12 NOTE — PROGRESS NOTES
Name: Candis Marcos  Age: 27 year old  YOB: 1995   Medical Record #: [id number]     Postpartum Day 1:     DATE: 2023  SUBJECTIVE:  Patient is doing well. Her pain is well controlled with current medications. She reports normal lochia. She is tolerating a regular diet without nausea. She is ambulating and voiding. She is passing flatus. The patient denies depression symptoms. She denies headache or vision changes, shortness of breath, or chest pain. She denies fever or chills. The baby is well. She is nursing.    OBJECTIVE:  /52 (BP Location: Left arm, Patient Position: Semi-Man's)   Pulse 68   Temp 98.3  F (36.8  C) (Oral)   Resp 16   SpO2 98%     I/O last 3 completed shifts:  In: 1500 [I.V.:1500]  Out: 1455 [Urine:1050; Blood:405]    Well developed and well nourished female in no apparent distress. Alert and oriented. Lungs are clear to auscultation bilaterally. Heart is regular rate and rhythm. Abdomen is nondistended with positive bowel sounds. Uterine fundus is firm and palpated below the umbilicus. Incision is clean, dry, and intact without erythema or exudate. Perineum is clean, dry and intact. Extremities no edema, non-tender.    LABS :  Lab Results   Component Value Date    ABO A 2021    RH Pos 2021    AS Negative 2023    HEPBANG Nonreactive 2022    TREPT Nonreactive 2023    RUQIGG Positive 2022    TSH 2.02 2014    HGB 9.7 (L) 2023    HCT 33.1 (L) 2023     2023    GBPCRT Negative 2022       CBC Results (Last 3)  Recent Labs   Lab Test 23  0604 23  0326 23  1402 22  1637   WBC  --  10.8 10.8 12.3*   RBC  --  4.02 3.76* 4.48   HGB 9.7* 10.5* 11.0* 13.2   HCT  --  33.1* 33.8* 40.4   MCV  --  82 90 90   PLT  --  213 236 343     2023 Treponema: pending    IMPRESSION :  1. 27 year old  female at 39w3d, now delivered.  2. POD# 1 s/p Repeat  section,  doing well.  3. Nursing  4. A1GDM  5. Anemia in pregnancy    PLAN:  1. Continue routine post-partum and post-operative care.  2. Encourage nursing.  3. Encourage ambulation.   4. I recommend that gestational diabetic patients follow up for fasting blood sugar and 2 hour 75gm glucose load at 6 weeks postpartum visit.  5. Continue iron supplementation.  6. Repeat Serology / Treponema Pallidum Antibody Test obtained per [facility] protocol.  7. I discussed some routine post-operative recommendations, precautions and instructions with the patient. I discussed incision and wound care recommendations and bathing recommendations.  8. Anticipate discharge on POD# 2-3 in stable condition.    Natan Greenwood MD  Obstetrics and Gynecology

## 2023-06-12 NOTE — LACTATION NOTE
INPATIENT LACTATION CONSULT    Candis Marcos                                                                             [id number]    Consultation Date: 2023    Reason for Lactation Referral:routine lactation assessment    Baby's :2023    Baby's Current Age:1 d  Baby's Gestational Age:39w 2d    Provider: Dr. Mesa/Dr cortez      Maternal History  Maternal History:GDm diet controlled  Breast Surgery:No  Breast Changes During Pregnancy:No  Breast Feeding History:Yes,  successful, Length of Time: 8 months  Delivery:unscheduled r c/s - arrived in L&D with SROM   Maternal Medications:PNV    Maternal Assessment  Breast Size: average  Nipple Appearance - Left: intact  Nipple Appearance - Right: intact  Nipple Erectility - Left: erect with stimulation  Nipple Erectility - Right: erect with stimulation  Areolas Compressibility: soft and pliable  Nipple Size: average  Milk Supply: colostrum    Infant Assessment  Birth Weight: 9 lb 1.7 oz  Current Weight: 8 lb 10.5 oz  Weight Loss Percentage: -4.94%  Mouth: normal  Palate: normal  Jaw: normal  Tongue: normal  Frenulum: normal  Digital Suck Exam: not assessed      Feeding  Feeding Time: 0940   Duration: R Breast 15 min   Effort to Latch:awake and alert, latched easily  Position: R Breast football hold  Devices:none    LATCH Score:  Latch:2 - Good Latch  Audible Swallowin - Spontaneous & frequent  Type of Nipple:2 - Everted  Comfort:2 - Soft, Nontender  Hold:2 - No Assist  Total LATCH Score:10/10    Education  Following education covered with mom. States understanding and denies any questions or concerns.      exclusivity explained and encouraged to establish breastfeeding and order in milk     rooming-in encouraged with explanation of benefits    encourage skin to skin with explanation of benefits    expressed breast milk and lanolin to nipples for healing and comfort as needed    feeding positions    obtaining a deep  "latch    how to properly unlatching babe    hand expression    handling and storage of expressed milk    frequency of feedings (8-12 times in 24 hr period)    how to tell babe is getting enough    feeing cues    burping techniques    anticipatory guidance for \"baby's second night\"    Feeding Plan  Continue to feed on demand when  elicits feeding cues with deep latch. Strive for 8-12 feeding sessions in a 24hr period. Will attempt to do as many feeding sessions skin to skin as possible. Follow-up support provided and OP lactation appt scheduled.      Whitney Shrestha RN, RNC-OB, IBCLC  Lactation Consultant  [facility]  "

## 2023-06-12 NOTE — PLAN OF CARE
Data: Vital signs within normal limits. Postpartum checks within normal limits - see flow record. Patient eating and drinking normally. Patient able to empty bladder independently and is up ambulating. No apparent signs of infection. Incision healing well. Patient performing self cares and is able to care for infant.  Action: Patient medicated during the shift for pain. See MAR. Patient reassessed within 1 hour after each medication and pain was improved - patient stated she was comfortable. Patient education done about  safe sleep, safe night time breastfeeding, bleeding and symptoms to report. See flow record.  Response: Positive attachment behaviors observed with infant. Support persons Bryce present. Patient makes needs known with call light in reach.  Plan: Anticipate discharge on 23.

## 2023-06-13 VITALS
SYSTOLIC BLOOD PRESSURE: 114 MMHG | WEIGHT: 171.2 LBS | HEART RATE: 72 BPM | OXYGEN SATURATION: 97 % | BODY MASS INDEX: 29.39 KG/M2 | DIASTOLIC BLOOD PRESSURE: 55 MMHG | TEMPERATURE: 98.2 F | RESPIRATION RATE: 16 BRPM

## 2023-06-13 PROCEDURE — 250N000013 HC RX MED GY IP 250 OP 250 PS 637: Performed by: OBSTETRICS & GYNECOLOGY

## 2023-06-13 RX ORDER — IBUPROFEN 800 MG/1
800 TABLET, FILM COATED ORAL EVERY 6 HOURS
Qty: 40 TABLET | Refills: 1 | Status: SHIPPED | OUTPATIENT
Start: 2023-06-13 | End: 2023-07-25

## 2023-06-13 RX ORDER — FERROUS SULFATE 325(65) MG
325 TABLET ORAL DAILY
Qty: 60 TABLET | Refills: 0 | Status: SHIPPED | OUTPATIENT
Start: 2023-06-13 | End: 2023-07-25

## 2023-06-13 RX ORDER — OXYCODONE HYDROCHLORIDE 5 MG/1
5 TABLET ORAL EVERY 4 HOURS PRN
Qty: 20 TABLET | Refills: 0 | Status: SHIPPED | OUTPATIENT
Start: 2023-06-13 | End: 2023-07-25

## 2023-06-13 RX ORDER — AMOXICILLIN 250 MG
2 CAPSULE ORAL 2 TIMES DAILY PRN
Qty: 20 TABLET | Refills: 1 | Status: SHIPPED | OUTPATIENT
Start: 2023-06-13 | End: 2023-07-25

## 2023-06-13 RX ORDER — OXYCODONE HYDROCHLORIDE 5 MG/1
5 TABLET ORAL EVERY 4 HOURS PRN
Qty: 220 TABLET | Refills: 0 | Status: SHIPPED | OUTPATIENT
Start: 2023-06-13 | End: 2023-06-13

## 2023-06-13 RX ADMIN — ACETAMINOPHEN 975 MG: 325 TABLET, FILM COATED ORAL at 09:20

## 2023-06-13 RX ADMIN — IBUPROFEN 800 MG: 800 TABLET, FILM COATED ORAL at 07:36

## 2023-06-13 RX ADMIN — OXYCODONE HYDROCHLORIDE 5 MG: 5 TABLET ORAL at 09:45

## 2023-06-13 RX ADMIN — IBUPROFEN 800 MG: 800 TABLET, FILM COATED ORAL at 01:30

## 2023-06-13 RX ADMIN — ACETAMINOPHEN 975 MG: 325 TABLET, FILM COATED ORAL at 02:33

## 2023-06-13 RX ADMIN — FERROUS SULFATE TAB 325 MG (65 MG ELEMENTAL FE) 325 MG: 325 (65 FE) TAB at 09:20

## 2023-06-13 RX ADMIN — DOCUSATE SODIUM 50MG AND SENNOSIDES 8.6MG 1 TABLET: 8.6; 5 TABLET, FILM COATED ORAL at 09:20

## 2023-06-13 RX ADMIN — PRENATAL VITAMINS-IRON FUMARATE 27 MG IRON-FOLIC ACID 0.8 MG TABLET 1 TABLET: at 09:20

## 2023-06-13 RX ADMIN — IBUPROFEN 800 MG: 800 TABLET, FILM COATED ORAL at 13:43

## 2023-06-13 ASSESSMENT — ACTIVITIES OF DAILY LIVING (ADL)
ADLS_ACUITY_SCORE: 20

## 2023-06-13 NOTE — PLAN OF CARE
Patient discharged via ambulation accompanied by spouse and staff. Prescriptions sent to patients preferred pharmacy. All belongings sent with patient.     Discharge instructions reviewed with Alex. Patient verbalized understanding and states no further questions. Listed belongings gathered and returned to patient.     Patient discharged to home.     Surgical Patient   Surgical Procedures during stay:  section  Did patient receive discharge instruction on wound care and recognition of infection symptoms? Yes    MISC  Follow up appointment made:  Yes  Home and hospital aquired medications returned to patient: N/A  Patient reports pain was well managed at discharge: Yes

## 2023-06-13 NOTE — DISCHARGE SUMMARY
Discharge Summary    Candis Marcos MRN# [id number]   YOB: 1995 Age: 27 year old     Date of Admission:  2023  Date of Discharge:  2023  Admitting Physician:  Ethan Mesa MD  Discharge Physician:  Ethan Mesa MD  Discharging Service:  Obstetrics and Gynecology     Primary Provider: Tess Tsang          Admission Diagnoses:   Hx of  section [Z98.891]  SROM at term  GDM.  Diet controlled          Discharge Diagnosis:     Term IUP, Delivered          Discharge Disposition:   Discharged to home           Condition on Discharge:   Discharge condition: Stable   Discharge vitals: Blood pressure 114/55, pulse 72, temperature 98.2  F (36.8  C), temperature source Oral, resp. rate 16, SpO2 97 %, unknown if currently breastfeeding.   Code status on discharge: Full Code           Procedures / Labs / Imaging:   Repeat LTCS 2023          Medications Prior to Admission:     Medications Prior to Admission   Medication Sig Dispense Refill Last Dose     acetaminophen (TYLENOL) 500 MG tablet Take 500-1,000 mg by mouth every 6 hours as needed for mild pain   More than a month     blood glucose (NO BRAND SPECIFIED) lancets standard Use to test blood sugar 4 times daily: Fasting in the morning and 1 hour after eating. 200 lancet. 2 6/10/2023     blood glucose (NO BRAND SPECIFIED) test strip Use to test blood sugar 4 times daily: Fasting in the morning and 1 hour after eating. 150 strip 2 6/10/2023     blood glucose monitoring (NO BRAND SPECIFIED) meter device kit Use to test blood sugar 4 times daily: Fasting in the morning and 1 hour after eating. 1 kit 0 6/10/2023     Prenatal Vit-Fe Fumarate-FA (PRENATAL VITAMINS PO)    6/10/2023             Discharge Medications:     Current Discharge Medication List      START taking these medications    Details   ferrous sulfate (FEROSUL) 325 (65 Fe) MG tablet Take 1 tablet (325 mg) by mouth daily  Qty: 60 tablet, Refills: 0     Associated Diagnoses: Anemia due to blood loss, acute      ibuprofen (ADVIL/MOTRIN) 800 MG tablet Take 1 tablet (800 mg) by mouth every 6 hours  Qty: 40 tablet, Refills: 1    Associated Diagnoses:  delivery delivered      oxyCODONE (ROXICODONE) 5 MG tablet Take 1 tablet (5 mg) by mouth every 4 hours as needed for moderate to severe pain  Qty: 20 tablet, Refills: 0    Associated Diagnoses:  delivery delivered      senna-docusate (SENOKOT-S/PERICOLACE) 8.6-50 MG tablet Take 2 tablets by mouth 2 times daily as needed for constipation  Qty: 20 tablet, Refills: 1    Associated Diagnoses:  delivery delivered         CONTINUE these medications which have NOT CHANGED    Details   acetaminophen (TYLENOL) 500 MG tablet Take 500-1,000 mg by mouth every 6 hours as needed for mild pain      blood glucose (NO BRAND SPECIFIED) lancets standard Use to test blood sugar 4 times daily: Fasting in the morning and 1 hour after eating.  Qty: 200 lancet., Refills: 2    Associated Diagnoses: Gestational diabetes      blood glucose (NO BRAND SPECIFIED) test strip Use to test blood sugar 4 times daily: Fasting in the morning and 1 hour after eating.  Qty: 150 strip, Refills: 2    Associated Diagnoses: Gestational diabetes      blood glucose monitoring (NO BRAND SPECIFIED) meter device kit Use to test blood sugar 4 times daily: Fasting in the morning and 1 hour after eating.  Qty: 1 kit, Refills: 0    Associated Diagnoses: Gestational diabetes      Prenatal Vit-Fe Fumarate-FA (PRENATAL VITAMINS PO)                    Consultations:   No consultations were requested during this admission             Brief History of Illness:   Candis Marcos is a 27 year old female who was admitted for possible SROM  at term          Hospital Course:   Repeat LTCS 2023  Byrd catheter removed on pod#0 and diet advanced.  Patient was afebrile throughout hospitalization and postoperative course was uncomplicated.          Significant Results:   None             Pending Results:   None           Discharge Instructions and Follow-Up:   Discharge diet: Regular   Discharge activity: Lifting restricted to 20 pounds for 6 weeks   Discharge follow-up: Follow up with me in 2 weeks   Wound care: Keep clean and dry   Other instructions: None

## 2023-06-13 NOTE — PLAN OF CARE
Face to face report given with opportunity to observe patient.    Report given to Ashly Cisneros RN   6/13/2023  7:18 AM

## 2023-06-13 NOTE — DISCHARGE INSTRUCTIONS
No heavy lifting greater than 20lb  for 6 weeks  No driving 2 weeks or while on pain meds  Pelvic rest for 4 weeks  No vigorous activity, exercise, swim, bath for 4 weeks  Schedule Postop check Dr. Mesa 2 weeks.  Lactation f/u 2-4 days prn.  PP exam Dr. Mesa or Mary Rice NP 6 weeks.    Call Dr. Mesa [phone] as necessary if problems in interim     Warning Signs after Having a Baby    Keep this paper on your fridge or somewhere else where you can see it.    Call your provider if you have any of these symptoms up to 12 weeks after having your baby.    Thoughts of hurting yourself or your baby  Pain in your chest or trouble breathing  Severe headache not helped by pain medicine  Eyesight concerns (blurry vision, seeing spots or flashes of light, other changes to eyesight)  Fainting, shaking or other signs of a seizure    Call 9-1-1 if you feel that it is an emergency.     The symptoms below can happen to anyone after giving birth. They can be very serious. Call your provider if you have any of these warning signs.    My provider s phone number: _______________________    Losing too much blood (hemorrhage)    Call your provider if you soak through a pad in less than an hour or pass blood clots bigger than a golf ball. These may be signs that you are bleeding too much.    Blood clots in the legs or lungs    After you give birth, your body naturally clots its blood to help prevent blood loss. Sometimes this increased clotting can happen in other areas of the body, like the legs or lungs. This can block your blood flow and be very dangerous.     Call your provider if you:  Have a red, swollen spot on the back of your leg that is warm or painful when you touch it.   Are coughing up blood.     Infection    Call your provider if you have any of these symptoms:  Fever of 100.4 F (38 C) or higher.  Pain or redness around your stitches if you had an incision.   Any yellow, white, or green fluid coming from places where  you had stitches or surgery.    Mood Problems (postpartum depression)    Many people feel sad or have mood changes after having a baby. But for some people, these mood swings are worse.     Call your provider right away if you feel so anxious or nervous that you can't care for yourself or your baby.    Preeclampsia (high blood pressure)    Even if you didn't have high blood pressure when you were pregnant, you are at risk for the high blood pressure disease called preeclampsia. This risk can last up to 12 weeks after giving birth.     Call your provider if you have:   Pain on your right side under your rib cage  Sudden swelling in the hands and face    Remember: You know your body. If something doesn't feel right, get medical help.     For informational purposes only. Not to replace the advice of your health care provider. Copyright 2020 [facility]. All rights reserved. Clinically reviewed by Paradise Fernandez, RNC-OB, MSN. [facility] labs [id number] - Rev .      Vaginal Delivery or  Birth   Discharge Instructions    Activity: Go back to your normal activities    Diet: You may eat a regular diet. Drink plenty of fluids.      Call your Doctor  if you have any of these symptoms:    * You soak a sanitary pad with blood within 1 hour, or you see clots larger than a  golf ball.    * Bleeding that lasts more than 6 weeks.     *Bad-smelling fluid that comes out of your vagina.    *A fever above 100.4 degrees Fahrenheit (38.4 degrees Celsius) with or without chills.    * Severe pain, cramping, or tenderness in your lower belly area.    * Increased pain, swelling, redness or fluid around your stitches.    * A need to urinate more frequently (use the toilet more often), more urgently (use the toilet very quickly), or it burns when you urinate.    * Redness, swelling, or pain around a vein in your leg.    * Problems coping with sadness, anxiety, or depression.    * Problems breastfeeding, or a red or painful  area on your breast.    * You have questions or concerns after you return home.      [facility]: [phone]

## 2023-06-13 NOTE — PLAN OF CARE
Assessments as charted. B/P: 105/61, T: 98.3, P: 68, R: 18. Rates pain: 4/10 . Incision: Healing well, well approximated, and without signs of infection. Voiding without difficulty. Fundus firm, U/2. Lochia: Light. Activity: unrestricted with out pain. Infant feeding: Breast feeding going well.     Postpartum breastfeeding assessment completed and education provided, see Patient Education Activity.  Items included in the education are:   proper positioning and latch  effectiveness of feeding  manual expression  handling and storing breastmilk  maintenance of breastfeeding for the first 6 months  sign/symptoms of infant feeding issues requiring referral to qualified health care provider  Postpartum care education provided, see Patient Education activity. Patient denies needs. Will monitor.  Kimmie Cisneros RN

## 2023-06-13 NOTE — PLAN OF CARE
Data: Vital signs within normal limits. Postpartum checks within normal limits - see flow record. Patient eating and drinking normally. Patient able to empty bladder independently and is up ambulating. No apparent signs of infection. Incision healing well. Patient performing self cares and is able to care for infant.  Action: Patient medicated during the shift for pain. See MAR. Patient reassessed within 1 hour after each medication and pain was improved - patient stated she was comfortable. Patient education done. See flow record.  Response: Positive attachment behaviors observed with infant. Support persons Brody present.   Plan: Anticipate discharge today.

## 2023-06-16 ENCOUNTER — HOSPITAL ENCOUNTER (OUTPATIENT)
Dept: OBGYN | Facility: HOSPITAL | Age: 28
Discharge: HOME | End: 2023-06-16
Attending: OBSTETRICS & GYNECOLOGY | Admitting: OBSTETRICS & GYNECOLOGY
Payer: COMMERCIAL

## 2023-06-16 PROCEDURE — G0463 HOSPITAL OUTPT CLINIC VISIT: HCPCS

## 2023-06-16 NOTE — LACTATION NOTE
Outpatient Lactation Visit    Candis Marcos                                                                                                   [id number]      Consultation Date: 2023     Reason for Lactation Referral: routine follow-up    Baby's :2023    Baby's Current Age: 5d  Baby's Gestation Age: 39w 2d    Primary Care Provider: Dr. Mesa/Dr. Calderon    History of Present Illness: none    MATERNAL HISTORY   History of Breast Surgery: No.  Breast Changes During Pregnancy:No  Breast Feeding History: Yes,  successful, Length of Time: 8 months  Maternal Meds:PNV, Colace, Ibuprofen, Tylenol, Oxycodone  Pregnancy complications:GDM diet controlled  Delivery:r c/s    MATERNAL ASSESSMENT    Breast Size: average, symmetrical, soft after feeding and filling prior to feeding  Nipple Appearance - Left: intact with no breakdown noted  Nipple Appearance - Right: intact with no breakdown noted  Nipple Erectility - Left: erect with stimulation  Nipple Erectility - Right: erect with stimulation  Areolas Compressibility: soft  Nipple Size: average  Milk Supply: mature    INFANT ASSESSMENT    Oral Anatomy  Mouth: normal  Palate: normal  Jaw: normal  Tongue: normal  Frenulum: normal   Digital Suck Exam: root    FEEDING   Feeding Time: 1200 for 7 minutes  Position: left breast cross cradle  Effort to Latch: awake and alert, latched easily  Duration of Breast Feeding: Left Breast: 7  Results: excellent breast feed    Volume of Intake:    Birth Weight: 9 lb 1.7 oz    Discharge from Hospital after delivery weight: 8 lb 8.5 oz   TODAY 2023    Pre-Weight: 8 lb 9 oz    Post-Weight: 8 lb 12 oz    Total Intake: 3 oz  Output: 10-12 soil diapers in last 24 hrs, 10-12 wet diapers in last 24 hrs    LATCH SCORE:  Latch:2 - Good Latch  Audible Swallowin - Spontaneous & frequent  Type of Nipple:2 - Everted  Comfort:2 - Soft, Nontender  Hold:2 - No Assist  Total LATCH  Score:10/10    Babe to scale for pre-feed naked weight. Mom positioned and latched babe well ind with the use of the nursing pillow. Once latched good sucking swallowing noted. Ismael nursed well for 7 min and unlatched self. Mom burped babe and she appeared satisfied. Retained entire feeding and sleep in moms arms. Post feed weight obtained. Donalde dressed by dad and placed in car seat. Mom requested her incision be checked. Reports swelling and burning sensation. Abdominal binder in place. Incision clean dry and intact and open to air with liquid glue in place. Light bruising noted to incision with no redness or drainage present. Swelling present above incision. No warmth noted. Mom reports doing mor pauline she probably should. Encourage to take it easy and no over do things at home. VS and no temp present. Per parents babe eating every 2-3 hours at home. Waking for feedings. During feeds babe eat 10 min per side, retains feeds and appears satisfied. Mom started pumping upon discharge home due to engorgement. Pumping in the am x1 and pm x1. Noted to get about 5oz total with each pumping session. Denies any questions or concerns at this time.     FEEDING PLAN    Home Feeding Plan: Continue to feed on demand when  elicits feeding cues with deep latch.  Exclusivity explained and encouraged in the early weeks to establish breastfeeding and order in milk supply.  Rooming-in encouraged with explanation of the benefits.  Continue to apply expressed breast milk and Lanolin cream to nipples after feedings for healing and comfort.  Postpartum breastfeeding assessment completed and education provided.  Items included in the education are:     proper positioning and latch    maternal nutritional needs    s/s of plugged ducts/mastitis    Feeding cues    sterilization and cleaning of pumping materials and bottles    effectiveness of feeding    manual expression    handling and storing breastmilk    maintenance of  breastfeeding for the first 6 months    sign/symptoms of infant feeding issues requiring referral to qualified health care provider    LACTATION COMMENTS   Deep latch explained for proper positioning of breast in infant's mouth, maximizing milk transfer and comfort.  Hand expression taught and return demonstration observed with mature milk present.  Reassurance and encouragement provided in regard to mom's concerns about milk supply.  Follow-up support information provided.    Face-to-face Time: 45 minutes with assessment and education.    Whitney Shrestha RN, RNC-OB, IBCLC  Lactation Consultant  [place]

## 2023-06-27 ENCOUNTER — PRENATAL OFFICE VISIT (OUTPATIENT)
Dept: OBGYN | Facility: OTHER | Age: 28
End: 2023-06-27
Attending: NURSE PRACTITIONER
Payer: COMMERCIAL

## 2023-06-27 VITALS
BODY MASS INDEX: 25.95 KG/M2 | WEIGHT: 152 LBS | SYSTOLIC BLOOD PRESSURE: 100 MMHG | DIASTOLIC BLOOD PRESSURE: 60 MMHG | HEIGHT: 64 IN | OXYGEN SATURATION: 98 % | HEART RATE: 91 BPM

## 2023-06-27 LAB — HGB BLD-MCNC: 12.7 G/DL (ref 11.7–15.7)

## 2023-06-27 PROCEDURE — 85018 HEMOGLOBIN: CPT | Performed by: NURSE PRACTITIONER

## 2023-06-27 PROCEDURE — 36415 COLL VENOUS BLD VENIPUNCTURE: CPT | Performed by: NURSE PRACTITIONER

## 2023-06-27 PROCEDURE — 99207 PR NO CHARGE LOS: CPT | Performed by: NURSE PRACTITIONER

## 2023-06-27 ASSESSMENT — PAIN SCALES - GENERAL: PAINLEVEL: NO PAIN (0)

## 2023-06-27 NOTE — PROGRESS NOTES
"SUBJECTIVE:  Candis Marcos is a 27 year old female P2 here for a 2 week postpartum visit.  She had a  Section  on 23 delivering a healthy baby girl weighing 9 lbs 1 oz at term.      Today's Depression Rating was 1    delivery complications:  No  breast feeding:  Yes, doing well.  Denies concerns.  bladder problems:  No  bowel problems/hemorrhoids:  No  episiotomy/laceration/incision healed? Yes: denies pain.  vaginal flow:  scant  [place]:  No  contraception:  IUD  emotional adjustment:  doing well and happy      OBJECTIVE:  Blood pressure 100/60, pulse 91, height 1.626 m (5' 4\"), weight 68.9 kg (152 lb), SpO2 98 %, unknown if currently breastfeeding.   General - pleasant female in no acute distress.  Abdomen - soft, nontender, nondistended, no hepatosplenomegaly. Incision CDI    ASSESSMENT:  Normal 2 week postpartum exam    PLAN:  Continue post op restrictions  Breastfeeding anticipatory guidance  Signs of PPD reviewed.   Return at 6 weeks PP as scheduled   "

## 2023-07-25 ENCOUNTER — PRENATAL OFFICE VISIT (OUTPATIENT)
Dept: OBGYN | Facility: OTHER | Age: 28
End: 2023-07-25
Attending: OBSTETRICS & GYNECOLOGY
Payer: COMMERCIAL

## 2023-07-25 VITALS
WEIGHT: 148 LBS | OXYGEN SATURATION: 98 % | HEIGHT: 64 IN | DIASTOLIC BLOOD PRESSURE: 70 MMHG | BODY MASS INDEX: 25.27 KG/M2 | SYSTOLIC BLOOD PRESSURE: 103 MMHG | HEART RATE: 69 BPM

## 2023-07-25 DIAGNOSIS — K43.9 VENTRAL HERNIA WITHOUT OBSTRUCTION OR GANGRENE: ICD-10-CM

## 2023-07-25 DIAGNOSIS — Z30.014 ENCOUNTER FOR INITIAL PRESCRIPTION OF INTRAUTERINE CONTRACEPTIVE DEVICE (IUD): Primary | ICD-10-CM

## 2023-07-25 PROBLEM — Z34.82 NORMAL PREGNANCY IN MULTIGRAVIDA IN SECOND TRIMESTER: Status: RESOLVED | Noted: 2022-12-09 | Resolved: 2023-07-25

## 2023-07-25 PROBLEM — O24.410 DIET CONTROLLED GESTATIONAL DIABETES MELLITUS (GDM) IN THIRD TRIMESTER: Status: RESOLVED | Noted: 2023-04-14 | Resolved: 2023-07-25

## 2023-07-25 PROBLEM — O34.219 PREVIOUS CESAREAN DELIVERY, ANTEPARTUM CONDITION OR COMPLICATION: Status: RESOLVED | Noted: 2022-12-09 | Resolved: 2023-07-25

## 2023-07-25 LAB — HCG UR QL: NEGATIVE

## 2023-07-25 PROCEDURE — 58300 INSERT INTRAUTERINE DEVICE: CPT | Performed by: OBSTETRICS & GYNECOLOGY

## 2023-07-25 PROCEDURE — 99207 PR POST PARTUM EXAM: CPT | Performed by: OBSTETRICS & GYNECOLOGY

## 2023-07-25 PROCEDURE — 81025 URINE PREGNANCY TEST: CPT | Performed by: OBSTETRICS & GYNECOLOGY

## 2023-07-25 ASSESSMENT — PAIN SCALES - GENERAL: PAINLEVEL: NO PAIN (0)

## 2023-07-25 NOTE — PROGRESS NOTES
"SUBJECTIVE:  Candis Marcos is a 27 year old female P2 here for a postpartum visit.  She had a  Section   delivering a healthy baby girl  Currently no complaints and doing well.    Today's Depression Rating was 1    delivery complications:  No  breast feeding:  Yes  bladder problems:  No  bowel problems/hemorrhoids:  No  episiotomy/laceration/incision healed? Yes  vaginal flow:  None  [place]:  Yes  1 wk ago, neg HCG today  contraception:  none.  Desiring Mirena IUD for BC which she has used successfully in the past.   emotional adjustment:  doing well and happy      OBJECTIVE:  Blood pressure 103/70, pulse 69, height 1.626 m (5' 4\"), weight 67.1 kg (148 lb), SpO2 98 %, currently breastfeeding.   General - pleasant female in no acute distress.  Breast - no nodularity, asymmetry or nipple discharge bilaterally.  Abdomen - soft, nontender, nondistended, no hepatosplenomegaly. Umbilical hernia again noted, reducible.   Pelvic - EG: normal adult female, BUS: within normal limits, Vagina: well rugated, no discharge, Cervix: no lesions or CMT, Uterus: firm, normal sized and nontender, Adnexae: no masses or tenderness.  Rectovaginal - deferred.    IUD insertion:  Type:  Mirena  Risk, benefits and alternatives were  discussed and literature given to patient and all questions answered.  Pt desires to proceed.  Consent form reviewed and signed.    Cervix visualized with speculum  Betadaine prep   Tenaculum placed anterior cervix  Uterus sounded to 8 cm   Mirena inserted in usual fashion  String cut 3cm.  No complications.  Patient tolerated procedure well     ASSESSMENT:  normal postpartum exam.  Released from pregnancy related restrictions  BC:  Successful Mirena IUD insertion.   Umbilical hernia.  Minimally symptomatic.     PLAN:    May resume normal activities without restrictions  Pap smear Not Done today    The patient will use IUD for birth control. Full counseling was provided, and all questions " answered. Pt comfortable checking her own IUD strings.    Gen surgery referral for hernia  Return to clinic in one year for an annual, when due for a pap smear or PRN.      Ethan Mesa MD

## 2023-08-09 ENCOUNTER — MEDICAL CORRESPONDENCE (OUTPATIENT)
Dept: HEALTH INFORMATION MANAGEMENT | Facility: HOSPITAL | Age: 28
End: 2023-08-09

## 2023-08-09 ENCOUNTER — OFFICE VISIT (OUTPATIENT)
Dept: SURGERY | Facility: OTHER | Age: 28
End: 2023-08-09
Attending: SURGERY
Payer: COMMERCIAL

## 2023-08-09 VITALS
BODY MASS INDEX: 25.27 KG/M2 | HEIGHT: 64 IN | SYSTOLIC BLOOD PRESSURE: 102 MMHG | OXYGEN SATURATION: 100 % | DIASTOLIC BLOOD PRESSURE: 62 MMHG | WEIGHT: 148 LBS | HEART RATE: 99 BPM | TEMPERATURE: 97.9 F

## 2023-08-09 DIAGNOSIS — K43.9 VENTRAL HERNIA WITHOUT OBSTRUCTION OR GANGRENE: Primary | ICD-10-CM

## 2023-08-09 PROCEDURE — 99203 OFFICE O/P NEW LOW 30 MIN: CPT | Performed by: SURGERY

## 2023-08-09 ASSESSMENT — PAIN SCALES - GENERAL: PAINLEVEL: NO PAIN (0)

## 2023-08-10 NOTE — PROGRESS NOTES
"[facility] Consultation    CC:  Bulge at umbilicus     HPI:  This 28 year old year old female is seen at the request of Dr. Mesa for evaluation of abdominal bulge. She noted it with her first pregnancy and then seemed to get bigger with her second. It does not hurt. She denies that it has gotten bigger. She denies changes in stool habits. She has concerns about repair in setting of plans for a future pregnancy. She is 2 months post-partum currently.     Past Medical History:   Diagnosis Date    Anemia of pregnancy, third trimester 12/15/2021    Diet controlled gestational diabetes mellitus (GDM) in third trimester 2023    A1GDM    Infection due to 2019 novel coronavirus 2022       Past Surgical History:   Procedure Laterality Date     SECTION N/A 3/9/2022    Procedure: PRIMARY  SECTION;  Surgeon: Fredrick Severino MD;  Location: [facility]     SECTION N/A 2023    Procedure:  SECTION viable female at 0744 apgars 9/9;  Surgeon: Ethan Mesa MD;  Location: [facility]       No Known Allergies    Current Outpatient Medications   Medication    Prenatal Vit-Fe Fumarate-FA (PRENATAL VITAMINS PO)     Current Facility-Administered Medications   Medication    levonorgestrel (MIRENA) 52 MG (20 mcg/day) IUD 1 each       HABITS:    Social History     Tobacco Use    Smoking status: Never    Smokeless tobacco: Never   Substance Use Topics    Alcohol use: No    Drug use: No       No family history on file.    REVIEW OF SYSTEMS:  Ten point review of systems negative except those mentioned in the HPI.     OBJECTIVE:    /62   Pulse 99   Temp 97.9  F (36.6  C) (Tympanic)   Ht 1.626 m (5' 4\")   Wt 67.1 kg (148 lb)   SpO2 100%   BMI 25.40 kg/m      GENERAL: Generally appears well, in no distress with appropriate affect.  HEENT:   Sclerae anicteric - normocephalic atraumatic   Respiratory:  No acute distress, no splinting   Cardiovascular:  Regular Rate and Rhythm  Abdomen: " There is a palpable lump superior to the umbilicus, There is a diastasis of her rectus muscles, there is no palpable defect noted, It is non-tender.   :  deferred  Extremities:  Extremities normal. No deformities, edema, or skin discoloration.  Skin:  no suspicious lesions or rashes  Neurological: grossly intact  Psych:  Alert, oriented, affect appropriate with normal decision making ability.    IMPRESSION:     Presumed umbilical hernia, It was more prominent during pregnancy. Could not palpate defect on exam making wonder if defect closed after delivery. She does have a diastasis of her rectus muscles. She is wondering if she had another child if she would have a recurrence, I discussed that would not necessarily but would put strain on repair. She would like to hold off on any surgical intervention. Discussed if becomes more symptomatic or symptoms or obstruction she should come back to be seen.     PLAN:    Continue observation for now.     Jim Gamino MD,     8/10/2023  9:12 AM

## 2023-09-08 ENCOUNTER — MEDICAL CORRESPONDENCE (OUTPATIENT)
Dept: HEALTH INFORMATION MANAGEMENT | Facility: HOSPITAL | Age: 28
End: 2023-09-08

## 2023-11-07 ENCOUNTER — APPOINTMENT (OUTPATIENT)
Dept: OCCUPATIONAL MEDICINE | Facility: OTHER | Age: 28
End: 2023-11-07

## 2023-11-13 ENCOUNTER — MEDICAL CORRESPONDENCE (OUTPATIENT)
Dept: HEALTH INFORMATION MANAGEMENT | Facility: CLINIC | Age: 28
End: 2023-11-13

## 2023-11-15 ENCOUNTER — APPOINTMENT (OUTPATIENT)
Dept: OCCUPATIONAL MEDICINE | Facility: OTHER | Age: 28
End: 2023-11-15

## 2023-12-04 NOTE — PROGRESS NOTES
Doing well.  No concerns today.  Discussed signs of labor and when to call or come in.  Discussed kick counts and fetal movement.  US growth tomorrow  RTC in 1 week  BS good  Denies regular contractions, vaginal bleeding, HAMZAH Mesa MD  5/18/2023       This patient is being seen in consultation from Surya Bullock MD    CHIEF COMPLAINT(S): No chief complaint on file.      HISTORY OF PRESENT ILLNESS:  Bernice Mattson is a 80 year old female presenting to clinic for initial evaluation of the right knee. Patient reports the pain started *** and occurred when ***.  Patient localizes the pain to ***.  *** aggravate the patient's pain.  Interventions include ***.  Patient denies*** any history of injections to the involved area.  Patient reports no*** previous history of injury.***       Accompanied by ***  Location:right knee  Date of Onset: ***  Context: see above  Did this injury occur at work: {sskyesno:475740}  Occupation: ***  Severity:{#:76982}/10   Timing: {[facility] pain timin}  Quality: {[facility] pain adjective:486284}  Associated signs and symptoms: {[facility] pain related symptoms:127705}  Aggravating factors: {[facility] aggrevating factors:715580}  Alleviating factors: {[facility] alleviating factors:065547}  ***    How much of your personal health information do you want discussed with your family/friends? ***. Who can we discuss information with? ***    Review of Systems   Constitutional:  Negative for chills, fatigue and fever.   HENT:  Negative for dental problem, ear pain, mouth sores, sinus pain and sore throat.    Eyes:  Negative for pain, redness and itching.   Respiratory:  Negative for cough, chest tightness and wheezing.    Cardiovascular:  Negative for chest pain and leg swelling.   Gastrointestinal:  Negative for diarrhea, nausea and vomiting.   Endocrine: Negative for cold intolerance and heat intolerance.   Genitourinary:  Negative for difficulty urinating and genital sores.   Musculoskeletal:  Negative for back pain, joint swelling and neck pain.   Skin:  Negative for rash and wound.   Neurological:  Negative for dizziness and numbness.   Psychiatric/Behavioral:  Negative for confusion and sleep disturbance. The patient is not nervous/anxious.        The patient  was instructed to follow up with PCP regarding all positive ROS that were not directly pertinent with the chief complaint.    Roomed By: ***    Past Medical History Updated: {YES (DEF)/NO:27465}  PAST MEDICAL HISTORY:  Past Medical History:   Diagnosis Date    Aortic stenosis 01/2023    s/p TAVR    ASHD (arteriosclerotic heart disease)     s/p stent at time of TAVR, due to mechanics of valve, not ASHD    COPD (chronic obstructive pulmonary disease) (CMD)     CTS (carpal tunnel syndrome)     CVA (cerebral vascular accident) (CMD) 09/2022 9/2022 left ICA/MCA occlusion; s/p thrombectomy, angioplasty, carotid stenting; prolonged inpt rehab stay    DCIS (ductal carcinoma in situ) of breast 2013    HTN (hypertension)     Hyperglycemia     Hyperlipidemia     Obesity     Osteoarthritis, knee     Rectocele     Status post colonoscopy 2014    Status post transcatheter aortic valve replacement (TAVR) using bioprosthesis 01/24/2023    Javed 23 mm Elsa 3 Ultra - right femoral artery access    Stroke (CMD) 09/2022    ICA/MCA occlusion       Past Surgical History Updated: {YES (DEF)/NO:81164}  PAST SURGICAL HISTORY:  Past Surgical History:   Procedure Laterality Date    Carotid stent Left 09/2022    Left carotid angioplasty /stent    Cataract extraction extracapsular w/ intraocular lens implantation Right 04/09/2019    Cath place for coronary angiography w md sup - interp graft & rt heart  12/19/2022 12/19/2022 s/p BAV 20 mm Balloon, no CAD by [facility]    Coronary stent placement  01/24/2023 1/24/2023 s/p Ostial RCA Megatron 4.5 x 16 mm (5.0 mm NC) stent during TAVR procedure 2nd to CHB and occlusion    Replac aort valv prosth valv  12/19/2022 12/19/2022 s/p BAV 20 mm Balloon, no CAD by [facility]    Tavr iliofemoral-cv  01/24/2023 1/24/2023 s/p Javed 23 mm Lesa 3 Ultra - right femoral artery access    Thrombectomy  09/2022    cerebral       Past Family History Updated: {YES (DEF)/NO:25423}  FAMILY HISTORY:  Family  History   Problem Relation Age of Onset    Diabetes Mother     Heart Father     Cancer, Lung Brother     Cancer, Ovarian Daughter      Reviewed and non-contributory to patient's illness unless otherwise stated above    Past Social History Updated: {YES (DEF)/NO:16258}  SOCIAL HISTORY:  Social History     Socioeconomic History    Marital status:      Spouse name: Not on file    Number of children: Not on file    Years of education: Not on file    Highest education level: Not on file   Occupational History    Not on file   Tobacco Use    Smoking status: Former     Types: Cigarettes    Smokeless tobacco: Never   Vaping Use    Vaping Use: never used   Substance and Sexual Activity    Alcohol use: No    Drug use: No    Sexual activity: Never   Other Topics Concern    Not on file   Social History Narrative        Lives alone, near daughter     Social Determinants of Health     Financial Resource Strain: Low Risk  (2/1/2023)    Financial Resource Strain     Social Determinants: Financial Resource Strain: None   Food Insecurity: No Food Insecurity (2/1/2023)    Food Insecurity     Social Determinants: Food Insecurity: Never   Transportation Needs: No Transportation Needs (3/7/2023)    OASIS : Transportation     Lack of Transportation (Medical): No     Lack of Transportation (Non-Medical): No     Patient Unable or Declines to Respond: No   Physical Activity: Inactive (2/1/2023)    Exercise Vital Sign     Days of Exercise per Week: 0 days     Minutes of Exercise per Session: 0 min   Stress: Low Risk  (2/1/2023)    Stress     Social Determinants: Stress: Not at all   Social Connections: Feeling Socially Integrated (3/7/2023)    OASIS : Social Isolation     Frequency of experiencing loneliness or isolation: Rarely   Intimate Partner Violence: Not At Risk (12/16/2022)    Intimate Partner Violence     Social Determinants: Intimate Partner Violence Past Fear: No     Social Determinants: Intimate Partner  Violence Current Fear: No        MEDICATIONS:  Current Outpatient Medications   Medication Sig Dispense Refill    trazodone (DESYREL) 150 MG tablet Take 1 tablet by mouth nightly. 30 tablet 3    predniSONE (DELTASONE) 50 MG tablet Take 1 tablet by mouth 1/24/24 7 PM, 1 tablet 1/25 1 AM, 1 tablet 1/25 6 AM (13 hrs, 7 hrs, and 2 hours prior to CT) 3 tablet 0    famotidine (PEPCID) 20 MG tablet Take 1 tablet by mouth 1/24/24 7 PM and 1 tablets 1/25 6 AM (13 hours and 2 hours prior to CT) 2 tablet 0    diphenhydrAMINE (Benadryl Allergy) 25 MG capsule Take 2 capsules by mouth 1 hour prior to CT (1/25 7 AM) 2 capsule 0    acetaminophen (TYLENOL) 500 MG tablet Take 500 mg by mouth every morning.      dilTIAZem (Tiazac) 180 MG 24 hr capsule Take 1 capsule by mouth daily. 90 capsule 0    omeprazole (PriLOSEC) 40 MG capsule Take 1 capsule by mouth daily. 90 capsule 0    Aspirin Low Dose 81 MG EC tablet TAKE 1 TABLET EVERY DAY 90 tablet 0    atorvastatin (LIPITOR) 40 MG tablet TAKE 1 TABLET EVERY NIGHT 90 tablet 0    losartan (COZAAR) 50 MG tablet Take 1 tablet by mouth daily. 90 tablet 1    clopidogrel (PLAVIX) 75 MG tablet Take 1 tablet by mouth daily. 90 tablet 1    fluticasone-salmeterol 250-50 MCG/ACT inhaler Inhale 1 puff into the lungs in the morning and 1 puff in the evening. 180 each 0    albuterol (PROAIR RESPICLICK) 108 (90 Base) MCG/ACT inhaler Inhale 2 puffs into the lungs every 4 hours as needed for Shortness of Breath or Wheezing. 1 each 12    loratadine (CLARITIN) 10 MG tablet Take 1 tablet by mouth daily (before breakfast). 90 tablet 2    Vitamin D, Ergocalciferol, 50 mcg (2,000 units) capsule Take 1 capsule by mouth daily. 90 capsule 3     No current facility-administered medications for this visit.     ALLERGIES:     ALLERGIES:   Allergen Reactions    Iodinated Diagnostic Agents HIVES    Contrast Media RASH and PRURITUS     Suspected allergy after redness from CT with contrast        VITAL SIGNS:  Visit  Vitals  LMP  (LMP Unknown)     There is no height or weight on file to calculate BMI.    EXAM:  This is a 80 year old female, awake, alert, and cooperative. She is well nourished, well developed, and in no apparent distress.  Pulmonary - No increased work of breathing.  Lymphatics - There is no evidence of generalized adenopathy.  ***    IMAGING &TEST:  ***    ASSESSMENT & PLAN:  Bernice Mattson is a 80 year old female ***   1. ***  2. ***  3. ***  4. ***     Restrictions: ***    The patient will follow up with us in {NUMBERS 1-31:896581} {days wks mos yr:076141}    Ancillary staff notes were reviewed and accepted.    Electronically Signed by:    Osman Gramajo MD, 12/04/23    This note has been routed to the referring provider.    Note to patient: The 21st Century Cures Act makes medical notes like these available to patients in the interest of transparency. However, be advised this is a medical document. It is intended as peer to peer communication. It is written in medical language and may contain abbreviations or verbiage that are unfamiliar. It may appear blunt or direct. Medical documents are intended to carry relevant information, facts as evident, and the clinical opinion of the practitioner.

## 2023-12-31 ENCOUNTER — HOSPITAL ENCOUNTER (EMERGENCY)
Facility: HOSPITAL | Age: 28
Discharge: HOME | End: 2023-12-31
Attending: NURSE PRACTITIONER | Admitting: NURSE PRACTITIONER
Payer: COMMERCIAL

## 2023-12-31 ENCOUNTER — NURSE TRIAGE (OUTPATIENT)
Dept: NURSING | Facility: CLINIC | Age: 28
End: 2023-12-31

## 2023-12-31 VITALS
RESPIRATION RATE: 16 BRPM | SYSTOLIC BLOOD PRESSURE: 121 MMHG | DIASTOLIC BLOOD PRESSURE: 58 MMHG | OXYGEN SATURATION: 98 % | WEIGHT: 147.93 LBS | HEIGHT: 64 IN | TEMPERATURE: 97.9 F | HEART RATE: 83 BPM | BODY MASS INDEX: 25.25 KG/M2

## 2023-12-31 DIAGNOSIS — B34.9 ACUTE VIRAL SYNDROME: ICD-10-CM

## 2023-12-31 LAB
FLUAV RNA SPEC QL NAA+PROBE: POSITIVE
FLUBV RNA RESP QL NAA+PROBE: NEGATIVE
RSV RNA SPEC NAA+PROBE: NEGATIVE
SARS-COV-2 RNA RESP QL NAA+PROBE: NEGATIVE

## 2023-12-31 PROCEDURE — 87637 SARSCOV2&INF A&B&RSV AMP PRB: CPT | Performed by: NURSE PRACTITIONER

## 2023-12-31 PROCEDURE — 99213 OFFICE O/P EST LOW 20 MIN: CPT | Performed by: NURSE PRACTITIONER

## 2023-12-31 PROCEDURE — C9803 HOPD COVID-19 SPEC COLLECT: HCPCS

## 2023-12-31 PROCEDURE — G0463 HOSPITAL OUTPT CLINIC VISIT: HCPCS

## 2023-12-31 ASSESSMENT — ENCOUNTER SYMPTOMS
VOMITING: 0
NAUSEA: 0
ABDOMINAL PAIN: 0
FEVER: 0
COUGH: 1
EYE REDNESS: 0
HEADACHES: 0
MYALGIAS: 1
TROUBLE SWALLOWING: 0
RHINORRHEA: 1
CHILLS: 0
DIARRHEA: 0
FATIGUE: 1
EYE DISCHARGE: 0
SORE THROAT: 1
PSYCHIATRIC NEGATIVE: 1
SHORTNESS OF BREATH: 0

## 2023-12-31 ASSESSMENT — ACTIVITIES OF DAILY LIVING (ADL): ADLS_ACUITY_SCORE: 35

## 2023-12-31 NOTE — ED TRIAGE NOTES
Pt presents with c/o a sore throat, headache, body aches, cough, nasal congestion, fatigue. Sx started yesterday. Reports her  tested positive for influenza A. Pt has been taking alkaseilzer and nyquil. None today.

## 2023-12-31 NOTE — TELEPHONE ENCOUNTER
Pt  in Urgent Care last week and seen for Influenza A.  Pt congested, stuffy nose, headache and fatigue.  Pt wanting to know if she should go in for testing to know if Influenza A and if she should be on TamiFlu.  Pt intends to be seen for testing.  Christin Manzanares RN  [facility] Nurse Advisor    Reason for Disposition   [1] Influenza EXPOSURE (close contact) within the last 7 days AND [2] fever or any respiratory symptoms (i.e., cough, runny or stuffy nose, sore throat)   [1] Probable influenza (fever) with no complications AND [2] NOT HIGH RISK    Additional Information   Negative: Influenza has been diagnosed by a doctor (or NP/PA)   Negative: Influenza suspected (i.e., fever and respiratory symptoms; probable influenza exposure)   Negative: SEVERE difficulty breathing (e.g., struggling for each breath, speaks in single words)   Negative: Difficult to awaken or acting confused (e.g., disoriented, slurred speech)   Negative: Bluish (or gray) lips or face now   Negative: Shock suspected (e.g., cold/pale/clammy skin, too weak to stand, low BP, rapid pulse)   Negative: Sounds like a life-threatening emergency to the triager   Negative: [1] Symptoms of COVID-19 (e.g., cough, fever, SOB, or others) AND [2] lives in an area with community spread   Negative: [1] Sounds like a cold AND [2] no fever   Negative: [1] Cough with sputum AND [2] no fever   Negative: [1] Dry cough (no sputum) AND [2] no fever   Negative: [1] Throat pain AND [2] no fever   Negative: Influenza vaccine reaction is suspected   Negative: Chest pain  (Exception: MILD central chest pain, present only when coughing)   Negative: [1] Headache AND [2] stiff neck (can't touch chin to chest)   Negative: Fever > 104 F (40 C)   Negative: [1] Difficulty breathing AND [2] not severe AND [3] not from stuffy nose (e.g., not relieved by cleaning out the nose)   Negative: Patient sounds very sick or weak to the triager   Negative: [1] Fever > 101 F (38.3 C) AND [2]  age > 60 years   Negative: [1] Fever > 100.0 F (37.8 C) AND [2] bedridden (e.g., nursing home patient, CVA, chronic illness, recovering from surgery)   Negative: [1] Fever > 100.0 F (37.8 C) AND [2] diabetes mellitus or weak immune system (e.g., HIV positive, cancer chemo, splenectomy, organ transplant, chronic steroids)   Negative: Patient is HIGH RISK (e.g., age > 64 years, pregnant, HIV+, or chronic medical condition)   Negative: Fever present > 3 days (72 hours)   Negative: [1] Fever returns after gone for over 24 hours AND [2] symptoms worse or not improved   Negative: [1] Using nasal washes and pain medicine > 24 hours AND [2] sinus pain (around cheekbone or eye) persists   Negative: Earache   Negative: [1] Patient is NOT HIGH RISK AND [2] strongly requests antiviral medicine AND [3] flu symptoms present < 48 hours   Negative: [1] Nasal discharge AND [2] present > 10 days   Negative: Cough present > 3 weeks    Protocols used: Influenza Exposure-A-, Influenza - Seasonal-A-

## 2023-12-31 NOTE — DISCHARGE INSTRUCTIONS
Symptomatic treatments recommended.  -Discussed that antibiotics would not help symptoms of viral URI. Education provided on symptoms of secondary bacterial infection such as new fever, chills, rigors, shortness of breath, increased work of breathing, that can occur with viral URI and need for further evaluation, if they occur.   - Ensure you are staying hydrated by drinking plenty of fluids or eating foods such as popsicles, jello, pudding.  - Honey can be soothing for sore throat  - Warm salt water gurgles can help soothe sore throat  - Rest  - Humidifier can help with congestion and help keep mucus membranes such as throat and nose from drying out.  - Sleeping slightly propped up can help with congestion and postnasal drainage that can worsen cough at bedtime.  - As long as you have never been told to take Tylenol and/or Ibuprofen you can use them to manage fever and body aches per package instructions  Make sure you eat when you take ibuprofen to avoid stomach upset.  - OTC cough medications per package instructions to help with cough. Check to see if the cough/cold medication already has acetaminophen (Tylenol) in it. If it does avoid taking additional Tylenol.  - If sudden onset of new fever, worsening symptoms return for further evaluation.  - OTC nasal steroid such as Flonase can help decrease sinus inflammation to help with congestion.  - Education provided on symptoms of post-viral bacterial infections including ear infection and pneumonia. This would require re-evaluation for treatment.    Follow-up with primary care provider or return to urgent care/ED with any worsening in condition or additional concerns.

## 2023-12-31 NOTE — ED PROVIDER NOTES
History     Chief Complaint   Patient presents with    Pharyngitis    Cough     Sore throat, cough     HPI  Candis Marcos is a 28 year old female who presents urgent care today ambulatory with complaints of fatigue, congestion, rhinorrhea, sore throat, cough and myalgia.  Spouse currently has influenza A.  No asthma.  Staying hydrated.  No rashes.  Denies any fever, chills, nausea, vomiting, diarrhea, shortness of breath or chest pain.  Has been taking [place] and NyQuil, no OTC meds today.  No other concerns.    Allergies:  No Known Allergies    Problem List:    There are no problems to display for this patient.       Past Medical History:    Past Medical History:   Diagnosis Date    Anemia of pregnancy, third trimester 12/15/2021    Diet controlled gestational diabetes mellitus (GDM) in third trimester 2023    Infection due to 2019 novel coronavirus 2022       Past Surgical History:    Past Surgical History:   Procedure Laterality Date     SECTION N/A 3/9/2022    Procedure: PRIMARY  SECTION;  Surgeon: Fredrick Severino MD;  Location: [facility]     SECTION N/A 2023    Procedure:  SECTION viable female at 0744 apgars 9/9;  Surgeon: Ethan Mesa MD;  Location: [facility]       Family History:    No family history on file.    Social History:  Marital Status:   [2]  Social History     Tobacco Use    Smoking status: Never    Smokeless tobacco: Never   Substance Use Topics    Alcohol use: No    Drug use: No        Medications:    Prenatal Vit-Fe Fumarate-FA (PRENATAL VITAMINS PO)      Review of Systems   Constitutional:  Positive for fatigue. Negative for chills and fever.   HENT:  Positive for congestion, rhinorrhea and sore throat. Negative for ear pain and trouble swallowing.    Eyes:  Negative for discharge and redness.   Respiratory:  Positive for cough. Negative for shortness of breath.    Cardiovascular:  Negative for chest pain.   Gastrointestinal:   "Negative for abdominal pain, diarrhea, nausea and vomiting.   Genitourinary:  Negative for decreased urine volume.   Musculoskeletal:  Positive for myalgias. Negative for gait problem.   Skin:  Negative for rash.   Neurological:  Negative for headaches.   Psychiatric/Behavioral: Negative.       Physical Exam   BP: 121/58  Pulse: 83  Temp: 97.9  F (36.6  C)  Resp: 16  Height: 162.6 cm (5' 4\")  Weight: 67.1 kg (147 lb 14.9 oz)  SpO2: 98 %    Physical Exam  Vitals and nursing note reviewed.   Constitutional:       General: She is not in acute distress.     Appearance: Normal appearance. She is not ill-appearing or toxic-appearing.   HENT:      Right Ear: Tympanic membrane, ear canal and external ear normal.      Left Ear: Tympanic membrane, ear canal and external ear normal.      Nose: Congestion present. No rhinorrhea.      Mouth/Throat:      Mouth: Mucous membranes are moist.      Pharynx: No oropharyngeal exudate or posterior oropharyngeal erythema.      Tonsils: No tonsillar exudate.   Cardiovascular:      Rate and Rhythm: Normal rate and regular rhythm.      Pulses: Normal pulses.      Heart sounds: Normal heart sounds.   Pulmonary:      Effort: Pulmonary effort is normal.      Breath sounds: Normal breath sounds.   Abdominal:      General: Bowel sounds are normal.      Palpations: Abdomen is soft.   Musculoskeletal:      Cervical back: Normal range of motion and neck supple. No rigidity or tenderness.   Lymphadenopathy:      Cervical: No cervical adenopathy.   Neurological:      Mental Status: She is alert.   Psychiatric:         Mood and Affect: Mood normal.       ED Course     No results found for this or any previous visit (from the past 24 hour(s)).    Medications - No data to display    Assessments & Plan (with Medical Decision Making)     I have reviewed the nursing notes.    I have reviewed the findings, diagnosis, plan and need for follow up with the patient.  (B34.9) Acute viral syndrome  Plan:   Patient " ambulatory with a nontoxic appearance.  Lungs clear throughout, no asthma, shortness of breath or chest pain.  No signs of otitis media or strep.  Staying hydrated.  No rashes.  Spouse currently has influenza A.  COVID, influenza and RSV test pending.  Symptomatic treatment recommendations provided.  Alternate Tylenol and ibuprofen as needed for pain or fever.  Push fluids.  Over-the-counter Flonase as needed for nasal congestion.  Over-the-counter cough medication as needed for cough.  Follow-up with primary care provider or return to urgent care/ED with any worsening in condition or additional concerns.  Patient in agreement with treatment plan.    Discharge Medication List as of 12/31/2023  3:10 PM        Final diagnoses:   Acute viral syndrome     12/31/2023   [facility]       Nicki Whitfield, KENDRA  12/31/23 1511

## 2024-06-23 ENCOUNTER — HEALTH MAINTENANCE LETTER (OUTPATIENT)
Age: 29
End: 2024-06-23

## 2024-11-21 ENCOUNTER — APPOINTMENT (OUTPATIENT)
Dept: OCCUPATIONAL MEDICINE | Facility: OTHER | Age: 29
End: 2024-11-21

## 2024-12-01 ENCOUNTER — HOSPITAL ENCOUNTER (EMERGENCY)
Facility: HOSPITAL | Age: 29
Discharge: HOME | End: 2024-12-01
Attending: PHYSICIAN ASSISTANT
Payer: COMMERCIAL

## 2024-12-01 VITALS
TEMPERATURE: 98.1 F | WEIGHT: 148 LBS | OXYGEN SATURATION: 97 % | DIASTOLIC BLOOD PRESSURE: 76 MMHG | HEART RATE: 72 BPM | BODY MASS INDEX: 25.4 KG/M2 | RESPIRATION RATE: 18 BRPM | SYSTOLIC BLOOD PRESSURE: 130 MMHG

## 2024-12-01 DIAGNOSIS — R21 RASH: ICD-10-CM

## 2024-12-01 DIAGNOSIS — L25.9 CONTACT DERMATITIS, UNSPECIFIED CONTACT DERMATITIS TYPE, UNSPECIFIED TRIGGER: ICD-10-CM

## 2024-12-01 PROCEDURE — 99213 OFFICE O/P EST LOW 20 MIN: CPT | Performed by: PHYSICIAN ASSISTANT

## 2024-12-01 PROCEDURE — G0463 HOSPITAL OUTPT CLINIC VISIT: HCPCS

## 2024-12-01 ASSESSMENT — ACTIVITIES OF DAILY LIVING (ADL): ADLS_ACUITY_SCORE: 56

## 2024-12-02 NOTE — ED PROVIDER NOTES
"  History   Pt presents with rash to both sides of face that started Friday. Pt states it has a burning sensation and feels \"tight\". Pt denies fevers.        Chief Complaint   Patient presents with    Rash     HPI  Candis Marcos is a 29 year old female who presents for rash on her face.  She states that 2 days ago she noticed she had some dry skin and it has gotten worse over the last 3 days.  She has been using her same fragrance free moisturizer that she always has.  She states that all of a sudden she noticed her upper eyelids were starting to get red irritated and had a burning feeling they were very dry and looked scaling and then under her eyes the rash developed.  She is now spreading out to her ears and under her cheeks.  It is flaky and dry and feels burning.  It is not itchy.    She has not tried any new lotions or moisturizers.  She does not wear make-up.  She does not believe she has touched anything and touched her face.  She has not used any new hair products.  She is not in near any open bonfires.  Has not cooked anything such as peppers or onions and touched her face.  They do have a pet but he is outside.  She went to [employer] at someone else's house but ate similar foods that she always has.    Allergies:  No Known Allergies    Problem List:    There are no active problems to display for this patient.       Past Medical History:    Past Medical History:   Diagnosis Date    Anemia of pregnancy, third trimester 12/15/2021    Diet controlled gestational diabetes mellitus (GDM) in third trimester 2023    Infection due to 2019 novel coronavirus 2022       Past Surgical History:    Past Surgical History:   Procedure Laterality Date     SECTION N/A 3/9/2022    Procedure: PRIMARY  SECTION;  Surgeon: Fredrick Severino MD;  Location: [facility]     SECTION N/A 2023    Procedure:  SECTION viable female at 0744 apgars 9/9;  Surgeon: Ethan Mesa MD;  " Location: [facility]       Family History:    No family history on file.    Social History:  Marital Status:   [2]  Social History     Tobacco Use    Smoking status: Never    Smokeless tobacco: Never   Substance Use Topics    Alcohol use: No    Drug use: No        Medications:    Prenatal Vit-Fe Fumarate-FA (PRENATAL VITAMINS PO)          Review of Systems Please see HPI       Physical Exam   BP: 130/76  Pulse: 72  Temp: 98.1  F (36.7  C)  Resp: 18  Weight: 67.1 kg (148 lb)  SpO2: 97 %      Physical Exam  GENERAL INSPECTION: Alert, cooperative and in no acute distress.   SKIN: Pink, warm and dry without rash.  Rashes noted on her face which is red and raised it is on her upper eyelids and under her eyes extends out to the temporal area and is now going on to the Maller parts of her cheeks which is dry and flaky.  There is no other rash on her neck torso upper extremities  EYES: Conjunctiva clear, sclera white. No discharge.  Upper eyelids appear erythematous and irritated as to under both eyes.  This area has just a dry appearance with slight flakiness.  EARS: No masses or lesions of auricles or canals. No tragal tenderness. TMs pearly gray, landmarks and light reflex visible and non distorted.   NOSE: Mucosa pink and moist without discharge.  MOUTH/THROAT: Oral mucosa pink and moist. Pharynx without erythema. Tonsils without erythema, hypertrophy or exudate  NECK: Supple without lymphadenopathy.  CV: RRR without ectopy or murmur.  LUNGS/THORAX: Unlabored respirations. Clear to auscultation without wheezes, rales or rhonchi.  LYMPHNODES: No clavicular or axillary lymph adenopathy.   PSYCH: Pleasant and cooperative.      ED Course            No results found for this or any previous visit (from the past 24 hours).    Medications - No data to display    Assessments & Plan (with Medical Decision Making)         Discharge Medication List as of 12/1/2024  8:06 PM          Final diagnoses:   Rash   Contact dermatitis,  unspecified contact dermatitis type, unspecified trigger   Unknown etiology for rash.  We discussed cool baths.  Using Vanicream or petroleum jelly or moisturization.  Zyrtec 10 mg to help with any histamine release.  Over-the-counter cortisone very sparingly around her eyes and on the rash for no more than 7 days.  Increase humidity in the house.  Continue to monitor symptoms and follow-up if there is no improvement.       12/1/2024   [facility]

## 2024-12-02 NOTE — ED TRIAGE NOTES
"Pt presents with rash to both sides of face that started Friday. Pt states it has a burning sensation and feels \"tight\". Pt denies fevers.         "

## 2025-06-24 ENCOUNTER — OFFICE VISIT (OUTPATIENT)
Dept: OBGYN | Facility: OTHER | Age: 30
End: 2025-06-24
Attending: NURSE PRACTITIONER
Payer: COMMERCIAL

## 2025-06-24 VITALS
WEIGHT: 147 LBS | SYSTOLIC BLOOD PRESSURE: 108 MMHG | HEIGHT: 64 IN | HEART RATE: 68 BPM | DIASTOLIC BLOOD PRESSURE: 68 MMHG | BODY MASS INDEX: 25.1 KG/M2

## 2025-06-24 DIAGNOSIS — T83.32XA INTRAUTERINE CONTRACEPTIVE DEVICE THREADS LOST, INITIAL ENCOUNTER: Primary | ICD-10-CM

## 2025-06-24 ASSESSMENT — PAIN SCALES - GENERAL: PAINLEVEL_OUTOF10: NO PAIN (0)

## 2025-06-24 NOTE — PROGRESS NOTES
IUD Removal:  SUBJECTIVE:    Is a pregnancy test required: No.  Was a consent obtained?  Yes    Candis Marcos is a 29 year old female,, No LMP recorded. who presents today for IUD removal. Her current IUD was placed 2 years ago. She has not had problems with the IUD. She requests removal of the IUD because she desires to conceive    Today's PHQ-2 Score:       2023     3:00 PM   PHQ-2 (  [employer])   Q1: Little interest or pleasure in doing things 0   Q2: Feeling down, depressed or hopeless 0   PHQ-2 Score 0       PROCEDURE:    A speculum exam was performed and the cervix was visualized. The IUD string was not visualized. Using ring forceps, marshall, and alligator forceps attempts were made to locate the IUD strings but were unsuccessful.     POST PROCEDURE:    The patient tolerated the procedure well. Patient was discharged in stable condition.    Plan pelvic US for verification and placement and likely consult with Dr Mesa for hysteroscopy with IUD removal.    Mary Rice, KENDRA

## 2025-06-25 ENCOUNTER — RESULTS FOLLOW-UP (OUTPATIENT)
Dept: OBGYN | Facility: OTHER | Age: 30
End: 2025-06-25

## 2025-06-25 ENCOUNTER — HOSPITAL ENCOUNTER (OUTPATIENT)
Dept: ULTRASOUND IMAGING | Facility: HOSPITAL | Age: 30
Discharge: HOME | End: 2025-06-25
Attending: NURSE PRACTITIONER
Payer: COMMERCIAL

## 2025-06-25 DIAGNOSIS — T83.32XA INTRAUTERINE CONTRACEPTIVE DEVICE THREADS LOST, INITIAL ENCOUNTER: ICD-10-CM

## 2025-06-25 PROCEDURE — 76830 TRANSVAGINAL US NON-OB: CPT

## 2025-06-25 PROCEDURE — 76830 TRANSVAGINAL US NON-OB: CPT | Mod: 26 | Performed by: RADIOLOGY

## 2025-06-25 PROCEDURE — 76856 US EXAM PELVIC COMPLETE: CPT | Mod: 26 | Performed by: RADIOLOGY

## 2025-07-07 ENCOUNTER — OFFICE VISIT (OUTPATIENT)
Dept: OBGYN | Facility: OTHER | Age: 30
End: 2025-07-07
Attending: OBSTETRICS & GYNECOLOGY
Payer: COMMERCIAL

## 2025-07-07 VITALS — DIASTOLIC BLOOD PRESSURE: 82 MMHG | SYSTOLIC BLOOD PRESSURE: 112 MMHG | HEART RATE: 69 BPM | OXYGEN SATURATION: 100 %

## 2025-07-07 DIAGNOSIS — T83.39XA RETAINED INTRAUTERINE CONTRACEPTIVE DEVICE (IUD): Primary | ICD-10-CM

## 2025-07-07 NOTE — PROCEDURES
IUD REMOVAL PROCEDURE    Pt presents today for second attempt at IUD removal in the office.  IUD removal was attempted on 6/24/25 with Mary Rice.  No strings were visible.   US was then ordered and confirmed IUD in good location with the string in the lower uterine segment.     Pt consents to IUD removal in the office today. She would very much like to avoid removal in day surgery.    Speculum placed - cervix easily seen, no IUD strings, swabbed with betadine.  EMB pipelle easily inserted to 8cm. Cervical dilator then gently inserted. I initially used the endocervical brush but could not tease out the strings. IUD hook was used and then the packing forceps - I was able to grasp the string and IUD was removed intact. Disposed of appropriately.    Pt tolerated the procedure very well.   Pt was given post procedure instructions  Reviewed signs and symptoms of infection and to call/return if any concerns

## 2025-07-07 NOTE — PROGRESS NOTES
Gyn Clinic Visit Note  2025    S: Candis Marcos is a 29 year old  here today for removal of Mirena IUD.   Please refer to Mary Rice's note from 2025 - she was unable to remove the IUD in the office.  US was ordered to confirm intrauterine position - see below:    US 2025  MEASUREMENTS:  Uterus: 9.8 x 6 x 4 cm (Length x Height x Width)  Right Ovary: 4.9 x 3.3 x 3.4 cm (Length x Height x Width)  Left Ovary:  4.3 x 2.9 x 3.1 cm (Length x Height x Width)     UTERUS: There is an IUD seen centrally within the uterus. No uterine  masses are seen.     ADNEXA: Multiple follicles are seen in the right ovary. There is some  free fluid seen in the right adnexa. Left ovary appears normal  IMPRESSION:   IUD centrally within the uterus.    I reviewed the images - it looks like the strings are in the lower uterine segment.   Reviewed with the pt and she would like another attempt at removal in the office. She would like to avoid needing EUA to remove the IUD.  Reviewed risks and benefits including risk of infection, bleeding, uterine perforation, pain - all questions answered and she consents to IUD removal in the office today. Consent was signed.    Gyn Hx:   No LMP recorded.      10 minutes spent on the date of the encounter doing chart review, history and exam, documentation and further activities per the note excluding the time spent on the IUD removal procedure.         OB History    Para Term  AB Living   2 2 2 0 0 2   SAB IAB Ectopic Multiple Live Births   0 0 0 0 2      # Outcome Date GA Lbr Bar/2nd Weight Sex Type Anes PTL Lv   2 Term 23 39w2d  4.13 kg (9 lb 1.7 oz) F CS-LTranv Spinal N GARDEN[place]      Birth Comments: A1GDM      Name: Meredith      Apgar1: 9  Apgar5: 9   1 Term 22 40w1d 12:08 / 03:43 3.895 kg (8 lb 9.4 oz) F CS-LTranv EPI, Spinal N [place]      Name: Lori      Apgar1: 9  Apgar5: 9      Obstetric Comments   2023 A1GDM       Past Medical History:   Diagnosis  Date    Anemia of pregnancy, third trimester 12/15/2021    Diet controlled gestational diabetes mellitus (GDM) in third trimester 2023    A1GDM    Infection due to 2019 novel coronavirus 2022       Past Surgical History:   Procedure Laterality Date     SECTION N/A 3/9/2022    Procedure: PRIMARY  SECTION;  Surgeon: Fredrick Severino MD;  Location: [facility]     SECTION N/A 2023    Procedure:  SECTION viable female at 0744 apgars 9/9;  Surgeon: Ethan Mesa MD;  Location: [facility]       Social History     Tobacco Use    Smoking status: Never    Smokeless tobacco: Never   Substance Use Topics    Alcohol use: No       No family history on file.       No Known Allergies    No current outpatient medications on file.         O:   /82   Pulse 69   SpO2 100%     Nurse/Chaperone present for exam and procedure    PLEASE SEE IUD REMOVAL PROCEDURE NOTE      A:  Retained IUD - not able to remove at previous visit        Recent US shows IUD in good location in the endometrial cavity with the strings in the lower uterine segment.     P: IUD removal in the office today.        Post procedure instructions given.

## 2025-07-25 ENCOUNTER — OFFICE VISIT (OUTPATIENT)
Dept: FAMILY MEDICINE | Facility: OTHER | Age: 30
End: 2025-07-25
Attending: NURSE PRACTITIONER
Payer: COMMERCIAL

## 2025-07-25 VITALS
HEART RATE: 78 BPM | SYSTOLIC BLOOD PRESSURE: 100 MMHG | OXYGEN SATURATION: 99 % | HEIGHT: 64 IN | BODY MASS INDEX: 24.79 KG/M2 | WEIGHT: 145.2 LBS | TEMPERATURE: 98.1 F | DIASTOLIC BLOOD PRESSURE: 68 MMHG

## 2025-07-25 DIAGNOSIS — Z00.00 ROUTINE GENERAL MEDICAL EXAMINATION AT A HEALTH CARE FACILITY: Primary | ICD-10-CM

## 2025-07-25 DIAGNOSIS — Z13.1 SCREENING FOR DIABETES MELLITUS: ICD-10-CM

## 2025-07-25 DIAGNOSIS — Z13.0 SCREENING FOR IRON DEFICIENCY ANEMIA: ICD-10-CM

## 2025-07-25 DIAGNOSIS — R79.89 ELEVATED LFTS: ICD-10-CM

## 2025-07-25 DIAGNOSIS — Z13.220 LIPID SCREENING: ICD-10-CM

## 2025-07-25 LAB
ALBUMIN SERPL BCG-MCNC: 4.6 G/DL (ref 3.5–5.2)
ALBUMIN UR-MCNC: 10 MG/DL
ALP SERPL-CCNC: 98 U/L (ref 40–150)
ALT SERPL W P-5'-P-CCNC: 87 U/L (ref 0–50)
ANION GAP SERPL CALCULATED.3IONS-SCNC: 12 MMOL/L (ref 7–15)
APPEARANCE UR: CLEAR
AST SERPL W P-5'-P-CCNC: 75 U/L (ref 0–45)
BASOPHILS # BLD AUTO: 0.1 10E3/UL (ref 0–0.2)
BASOPHILS NFR BLD AUTO: 1 %
BILIRUB SERPL-MCNC: 0.3 MG/DL
BILIRUB UR QL STRIP: NEGATIVE
BUN SERPL-MCNC: 15.1 MG/DL (ref 6–20)
CALCIUM SERPL-MCNC: 9.6 MG/DL (ref 8.8–10.4)
CHLORIDE SERPL-SCNC: 104 MMOL/L (ref 98–107)
CHOLEST SERPL-MCNC: 150 MG/DL
COLOR UR AUTO: YELLOW
CREAT SERPL-MCNC: 0.84 MG/DL (ref 0.51–0.95)
EGFRCR SERPLBLD CKD-EPI 2021: >90 ML/MIN/1.73M2
EOSINOPHIL # BLD AUTO: 0.1 10E3/UL (ref 0–0.7)
EOSINOPHIL NFR BLD AUTO: 1 %
ERYTHROCYTE [DISTWIDTH] IN BLOOD BY AUTOMATED COUNT: 12.8 % (ref 10–15)
FASTING STATUS PATIENT QL REPORTED: YES
FASTING STATUS PATIENT QL REPORTED: YES
GLUCOSE SERPL-MCNC: 91 MG/DL (ref 70–99)
GLUCOSE UR STRIP-MCNC: NEGATIVE MG/DL
HCO3 SERPL-SCNC: 24 MMOL/L (ref 22–29)
HCT VFR BLD AUTO: 41.5 % (ref 35–47)
HDLC SERPL-MCNC: 56 MG/DL
HGB BLD-MCNC: 14 G/DL (ref 11.7–15.7)
HGB UR QL STRIP: ABNORMAL
IMM GRANULOCYTES # BLD: 0 10E3/UL
IMM GRANULOCYTES NFR BLD: 0 %
KETONES UR STRIP-MCNC: ABNORMAL MG/DL
LDLC SERPL CALC-MCNC: 82 MG/DL
LEUKOCYTE ESTERASE UR QL STRIP: NEGATIVE
LYMPHOCYTES # BLD AUTO: 1.8 10E3/UL (ref 0.8–5.3)
LYMPHOCYTES NFR BLD AUTO: 27 %
MCH RBC QN AUTO: 30.4 PG (ref 26.5–33)
MCHC RBC AUTO-ENTMCNC: 33.7 G/DL (ref 31.5–36.5)
MCV RBC AUTO: 90 FL (ref 78–100)
MONOCYTES # BLD AUTO: 0.7 10E3/UL (ref 0–1.3)
MONOCYTES NFR BLD AUTO: 10 %
MUCOUS THREADS #/AREA URNS LPF: PRESENT /LPF
NEUTROPHILS # BLD AUTO: 4 10E3/UL (ref 1.6–8.3)
NEUTROPHILS NFR BLD AUTO: 61 %
NITRATE UR QL: NEGATIVE
NONHDLC SERPL-MCNC: 94 MG/DL
NRBC # BLD AUTO: 0 10E3/UL
NRBC BLD AUTO-RTO: 0 /100
PH UR STRIP: 5.5 [PH] (ref 4.7–8)
PLATELET # BLD AUTO: 203 10E3/UL (ref 150–450)
POTASSIUM SERPL-SCNC: 4.2 MMOL/L (ref 3.4–5.3)
PROT SERPL-MCNC: 7.1 G/DL (ref 6.4–8.3)
RBC # BLD AUTO: 4.6 10E6/UL (ref 3.8–5.2)
RBC URINE: 4 /HPF
SODIUM SERPL-SCNC: 140 MMOL/L (ref 135–145)
SP GR UR STRIP: 1.03 (ref 1–1.03)
SQUAMOUS EPITHELIAL: 0 /HPF
TRIGL SERPL-MCNC: 58 MG/DL
UROBILINOGEN UR STRIP-MCNC: NORMAL MG/DL
WBC # BLD AUTO: 6.6 10E3/UL (ref 4–11)
WBC URINE: <1 /HPF

## 2025-07-25 PROCEDURE — 3074F SYST BP LT 130 MM HG: CPT | Performed by: NURSE PRACTITIONER

## 2025-07-25 PROCEDURE — 82247 BILIRUBIN TOTAL: CPT | Mod: ZL | Performed by: NURSE PRACTITIONER

## 2025-07-25 PROCEDURE — 80061 LIPID PANEL: CPT | Mod: ZL | Performed by: NURSE PRACTITIONER

## 2025-07-25 PROCEDURE — 99395 PREV VISIT EST AGE 18-39: CPT | Performed by: NURSE PRACTITIONER

## 2025-07-25 PROCEDURE — 99213 OFFICE O/P EST LOW 20 MIN: CPT | Mod: 25 | Performed by: NURSE PRACTITIONER

## 2025-07-25 PROCEDURE — 3078F DIAST BP <80 MM HG: CPT | Performed by: NURSE PRACTITIONER

## 2025-07-25 PROCEDURE — 81003 URINALYSIS AUTO W/O SCOPE: CPT | Mod: ZL | Performed by: NURSE PRACTITIONER

## 2025-07-25 PROCEDURE — 85025 COMPLETE CBC W/AUTO DIFF WBC: CPT | Mod: ZL | Performed by: NURSE PRACTITIONER

## 2025-07-25 PROCEDURE — 36415 COLL VENOUS BLD VENIPUNCTURE: CPT | Mod: ZL | Performed by: NURSE PRACTITIONER

## 2025-07-25 PROCEDURE — 1126F AMNT PAIN NOTED NONE PRSNT: CPT | Performed by: NURSE PRACTITIONER

## 2025-07-25 PROCEDURE — G0463 HOSPITAL OUTPT CLINIC VISIT: HCPCS

## 2025-07-25 PROCEDURE — 3048F LDL-C <100 MG/DL: CPT | Performed by: NURSE PRACTITIONER

## 2025-07-25 SDOH — HEALTH STABILITY: PHYSICAL HEALTH: ON AVERAGE, HOW MANY DAYS PER WEEK DO YOU ENGAGE IN MODERATE TO STRENUOUS EXERCISE (LIKE A BRISK WALK)?: 5 DAYS

## 2025-07-25 ASSESSMENT — PAIN SCALES - GENERAL: PAINLEVEL_OUTOF10: NO PAIN (0)

## 2025-07-25 ASSESSMENT — SOCIAL DETERMINANTS OF HEALTH (SDOH): HOW OFTEN DO YOU GET TOGETHER WITH FRIENDS OR RELATIVES?: TWICE A WEEK

## 2025-07-25 NOTE — PROGRESS NOTES
Preventive Care Visit  [facility]  GREGORY Daniels CNP, Family Medicine  Jul 25, 2025      Assessment & Plan     (Z00.00) Routine general medical examination at a health care facility  (primary encounter diagnosis)  Comment: Fasting lab work obtained today.  Results reviewed.  CBC is normal.  AST is slightly elevated at 75.  ALT is slightly elevated at 87.  Remaining comprehensive metabolic panel is normal.  Lipid profile is normal.  UA shows findings consistent with probable vaginal contamination with no infection.  Plan: Patient will return to the clinic office in 1 year for an annual physical exam and as needed.    (R79.89) Elevated LFTs  Comment: New finding  Plan: AST is slightly elevated at 75.  ALT is slightly elevated at 87.  Remaining comprehensive metabolic panel is normal.  Unclear why these are elevated.  This could be due to a number of different things, including recent viral illness, use of Tylenol, use of NSAIDs, or fatty liver.  Patient will repeat an outpatient hepatic panel in 1 week.  If her LFTs continue to be elevated, she will be scheduled for an abdominal ultrasound to further evaluate her liver.  Will continue to monitor.    (Z13.1) Screening for diabetes mellitus  Comment: Fasting glucose is normal at 91.  Plan: Comprehensive metabolic panel, UA Macroscopic         with reflex to Microscopic and Culture  UA shows findings consistent with probable vaginal contamination with no infection.  Will continue to monitor.    (Z13.220) Lipid screening  Comment: Fasting lipid profile is normal.  Plan: Lipid Profile  Will continue to monitor.    (Z13.0) Screening for iron deficiency anemia  Comment: CBC is normal.  Plan: CBC with Platelets & Differential  Will continue to monitor.      Patient has been advised of split billing requirements and indicates understanding: Yes    Counseling  Appropriate preventive services were addressed with this patient via screening,  questionnaire, or discussion as appropriate for fall prevention, nutrition, physical activity, Tobacco-use cessation, social engagement, weight loss and cognition.  Checklist reviewing preventive services available has been given to the patient.  Reviewed patient's diet, addressing concerns and/or questions.   Reviewed preventive health counseling, as reflected in patient instructions    Follow-up   Return in about 1 year (around 7/25/2026) for Annual Wellness Visit.        Nicole Chirinos is a 29 year old, presenting for the following:  Physical        7/25/2025     8:51 AM   Additional Questions   Roomed by April Bright   Accompanied by none         7/25/2025     8:51 AM   Patient Reported Additional Medications   Patient reports taking the following new medications Prenatal vitamin no iron      IUD out on 7/7/2025 with Dr. Deb LEE  Milton Chirinos is a 29-year-old female here for complete physical exam.  She is seen regularly by OB/GYN for pelvic exams and breast exams.  She recently had her IUD removed and she and her  planning a pregnancy.  She feels that she is overall healthy.  No recent illness or fever.  No shortness of breath or chest pain.  No recent changes in bowel or bladder habits.  She would like to have fasting lab work drawn today.  She has no particular concerns at this time.      Advance Care Planning    Discussed advance care planning with patient; however, patient declined at this time.        7/25/2025   General Health   How would you rate your overall physical health? Good   Feel stress (tense, anxious, or unable to sleep) Not at all         7/25/2025   Nutrition   Three or more servings of calcium each day? Yes   Diet: Carbohydrate counting   How many servings of fruit and vegetables per day? (!) 2-3   How many sweetened beverages each day? 0-1         7/25/2025   Exercise   Days per week of moderate/strenous exercise 5 days         7/25/2025   Social  Factors   Frequency of gathering with friends or relatives Twice a week   Worry food won't last until get money to buy more No   Food not last or not have enough money for food? No   Do you have housing? (Housing is defined as stable permanent housing and does not include staying outside in a car, in a tent, in an abandoned building, in an overnight shelter, or couch-surfing.) Yes   Are you worried about losing your housing? No   Lack of transportation? No   Unable to get utilities (heat,electricity)? No         7/25/2025   Dental   Dentist two times every year? Yes         Today's PHQ-2 Score:       7/25/2025     8:45 AM   PHQ-2 ( 1999 [employer])   Q1: Little interest or pleasure in doing things 0   Q2: Feeling down, depressed or hopeless 0   PHQ-2 Score 0    Q1: Little interest or pleasure in doing things Not at all   Q2: Feeling down, depressed or hopeless Not at all   PHQ-2 Score 0       Patient-reported           7/25/2025   Substance Use   Alcohol more than 3/day or more than 7/wk No   Do you use any other substances recreationally? No     Social History     Tobacco Use    Smoking status: Never     Passive exposure: Never    Smokeless tobacco: Never   Vaping Use    Vaping status: Never Used   Substance Use Topics    Alcohol use: Not Currently    Drug use: No          Mammogram Screening - Patient under 40 years of age: Routine Mammogram Screening not recommended.         7/25/2025   STI Screening   New sexual partner(s) since last STI/HIV test? No     History of abnormal Pap smear: No - age 21-29 PAP every 3 years recommended        12/9/2022     1:55 PM 8/19/2021     3:52 PM   PAP / HPV   PAP Negative for Intraepithelial Lesion or Malignancy (NILM)  Negative for Intraepithelial Lesion or Malignancy (NILM)            7/25/2025   Contraception/Family Planning   Questions about contraception or family planning No   What are your periods like? Not currently having periods        Reviewed and updated as needed this  visit by Provider                    No Known Allergies      No current outpatient medications on file.     No current facility-administered medications for this visit.          Patient Active Problem List   Diagnosis   (none) - all problems resolved or deleted          Past Medical History:   Diagnosis Date    Anemia of pregnancy, third trimester 12/15/2021    Cellulitis     Diet controlled gestational diabetes mellitus (GDM) in third trimester 2023    A1GDM    Eczema     Infection due to 2019 novel coronavirus 2022          Past Surgical History:   Procedure Laterality Date     SECTION N/A 2022    Procedure: PRIMARY  SECTION;  Surgeon: Fredrick Severino MD;  Location: [facility] OR     SECTION N/A 2023    Procedure:  SECTION viable female at 0744 apgars 9/9;  Surgeon: Ethan Mesa MD;  Location: [facility] OR    WISDOM TOOTH EXTRACTION      ONLY 1 REMOVED          Family History   Problem Relation Age of Onset    No Known Problems Mother     Hypertension Father     Hyperlipidemia Father     Myocardial Infarction Father 45    No Known Problems Brother         Younger brother    No Known Problems Maternal Grandmother     No Known Problems Maternal Grandfather     Hypertension Paternal Grandmother     Myocardial Infarction Paternal Grandfather         Possible silent MI    No Known Problems Daughter     No Known Problems Daughter           Family Status   Relation Name Status    Mo  Alive    Corona Grant  at age 48    Bro  Alive    MGMo  Alive    MGFa  Alive    PGMo  Alive    PGFa  Alive    Amrita  Alive    Amrita  Alive   No partnership data on file          Social History     Socioeconomic History    Marital status:      Spouse name: Not on file    Number of children: 2    Years of education: Not on file    Highest education level: Not on file   Occupational History    Occupation: Registered Nurse   Tobacco Use    Smoking status: Never     Passive exposure: Never     Smokeless tobacco: Never   Vaping Use    Vaping status: Never Used   Substance and Sexual Activity    Alcohol use: Not Currently    Drug use: No    Sexual activity: Yes     Partners: Male     Birth control/protection: I.U.D.   Other Topics Concern    Parent/sibling w/ CABG, MI or angioplasty before 65F 55M? Yes     Comment: Father passed away of a heart attack at 48 years old.   Social History Narrative    Social history:    Hand dominance: Right-handed    Current housing: House    Household members: Spouse and children    Current occupation[place] on [place] [employer] [place]    Additional social history: 2 daughters    1 brother     Social Drivers of Health     Financial Resource Strain: Low Risk  (7/25/2025)    Financial Resource Strain     Within the past 12 months, have you or your family members you live with been unable to get utilities (heat, electricity) when it was really needed?: No   Food Insecurity: Low Risk  (7/25/2025)    Food Insecurity     Within the past 12 months, did you worry that your food would run out before you got money to buy more?: No     Within the past 12 months, did the food you bought just not last and you didn t have money to get more?: No   Transportation Needs: Low Risk  (7/25/2025)    Transportation Needs     Within the past 12 months, has lack of transportation kept you from medical appointments, getting your medicines, non-medical meetings or appointments, work, or from getting things that you need?: No   Physical Activity: Unknown (7/25/2025)    Exercise Vital Sign     Days of Exercise per Week: 5 days     Minutes of Exercise per Session: Not on file   Stress: No Stress Concern Present (7/25/2025)    [employer] - Occupational Stress Questionnaire     Feeling of Stress : Not at all   Social Connections: Unknown (7/25/2025)    Social Connection and Isolation Panel [NHANES]     Frequency of Communication with Friends and Family: Not on file     Frequency  "of Social Gatherings with Friends and Family: Twice a week     Attends [employer] Services: Not on file     Active Member of Clubs or Organizations: Not on file     Attends Club or Organization Meetings: Not on file     Marital Status: Not on file   Interpersonal Safety: Low Risk  (7/25/2025)    Interpersonal Safety     Do you feel physically and emotionally safe where you currently live?: Yes     Within the past 12 months, have you been hit, slapped, kicked or otherwise physically hurt by someone?: No     Within the past 12 months, have you been humiliated or emotionally abused in other ways by your partner or ex-partner?: No   Housing Stability: Low Risk  (7/25/2025)    Housing Stability     Do you have housing? : Yes     Are you worried about losing your housing?: No          Review of Systems  Constitutional, HEENT, cardiovascular, pulmonary, GI, , musculoskeletal, neuro, skin, endocrine and psych systems are negative, except as otherwise noted.       Objective    Exam  /68 (BP Location: Left arm, Patient Position: Sitting, Cuff Size: Adult Regular)   Pulse 78   Temp 98.1  F (36.7  C) (Tympanic)   Ht 1.626 m (5' 4\")   Wt 65.9 kg (145 lb 3.2 oz)   LMP  (LMP Unknown)   SpO2 99%   BMI 24.92 kg/m     Estimated body mass index is 24.92 kg/m  as calculated from the following:    Height as of this encounter: 1.626 m (5' 4\").    Weight as of this encounter: 65.9 kg (145 lb 3.2 oz).    Physical Exam  General:  This is a developed, well-nourished, adequately hydrated, pleasant 29 year old year old female who appears in no acute distress.  She is oriented x 3.  Head: Facial features are symmetrical with no tics or unusual features.   Eyes: Orbital area with no edema or sagging tissue. Conjunctivae pink. No discharge. PERRLA. EOMI. Red reflex present bilaterally. Sclerae clear. Corneas clear.   Ears: TMs are pearly gray and intact without retraction. Conversational hearing is appropriate.   Nose: Nostrils " patent bilaterally. Nasal mucosa pink. Septum midline. Turbinates without swelling or perforation. No nasal discharge.   Mouth and throat: Oral mucosa pink and moist. Uvula midline, rises evenly. No hoarseness.   Neck: Trachea midline. No masses, thyromegaly or lymphadenopathy. Full range of motion of neck without discomfort.  Breasts: Deferred to OB/GYN.  Lungs: Respirations regular and unlabored. Breath sounds clear bilaterally throughout lungs.  Cardiovascular: Apical pulse regular. S1, S2 heard without splitting. No audible S3 or S4. No murmurs. Neck veins not distended. No carotid bruits noted. No edema.   GI:  Abdomen soft, nontender, and symmetrical. Bowel sounds present and active in all 4 quadrants. No masses. No hepatosplenomegaly. No guarding, distention, or rigidity.  Rectal exam is deferred.  :  No CVA tenderness. Bladder is nonpalpable.  Remaining  exam is deferred to OB/GYN.  Musculoskeletal: Posture is erect. Gait has regular rhythm. No muscle spasms noted. AROM without pain or difficulty in all joints.   Neuro:  Cranial nerves II - XII grossly intact. Patellar reflexes 2+.    Psych:  Alert and oriented in all spheres. Short and long term memory intact. Mood and affect are appropriate.  Speech content is appropriate.  Skin: No obvious suspicious rashes, lesions, or ulcers. Nailbeds are pink with brisk capillary refill. No clubbing or cyanosis.     Component      Latest Ref [facility] 7/25/2025  9:40 AM 7/25/2025  9:46 AM   WBC      4.0 - 11.0 10e3/uL 6.6     RBC Count      3.80 - 5.20 10e6/uL 4.60     Hemoglobin      11.7 - 15.7 g/dL 14.0     Hematocrit      35.0 - 47.0 % 41.5     MCV      78 - 100 fL 90     MCH      26.5 - 33.0 pg 30.4     MCHC      31.5 - 36.5 g/dL 33.7     RDW      10.0 - 15.0 % 12.8     Platelet Count      150 - 450 10e3/uL 203     % Neutrophils      % 61     % Lymphocytes      % 27     % Monocytes      % 10     % Eosinophils      % 1     % Basophils      % 1     % Immature  Granulocytes      % 0     NRBC/W      <1 /100 0     Absolute Neutrophil      1.6 - 8.3 10e3/uL 4.0     Absolute Lymphocytes      0.8 - 5.3 10e3/uL 1.8     Absolute Monocytes      0.0 - 1.3 10e3/uL 0.7     Absolute Eosinophils      0.0 - 0.7 10e3/uL 0.1     Absolute Basophils      0.0 - 0.2 10e3/uL 0.1     Absolute Immature Granulocytes      <=0.4 10e3/uL 0.0     Absolute NRBCs      10e3/uL 0.0     Sodium      135 - 145 mmol/L 140     Potassium      3.4 - 5.3 mmol/L 4.2     Carbon Dioxide (CO2)      22 - 29 mmol/L 24     Anion Gap      7 - 15 mmol/L 12     Urea Nitrogen      6.0 - 20.0 mg/dL 15.1     Creatinine      0.51 - 0.95 mg/dL 0.84     GFR Estimate      >60 mL/min/1.73m2 >90     Calcium      8.8 - 10.4 mg/dL 9.6     Chloride      98 - 107 mmol/L 104     Glucose      70 - 99 mg/dL 91     Alkaline Phosphatase      40 - 150 U/L 98     AST      0 - 45 U/L 75 (H)     ALT      0 - 50 U/L 87 (H)     Protein Total      6.4 - 8.3 g/dL 7.1     Albumin      3.5 - 5.2 g/dL 4.6     Bilirubin Total      <=1.2 mg/dL 0.3     Patient Fasting? Yes     Patient Fasting? Yes     Color Urine      Colorless, Straw, Light Yellow, Yellow   Yellow    Appearance Urine      Clear   Clear    Glucose Urine      Negative mg/dL  Negative    Bilirubin Urine      Negative   Negative    Ketones Urine      Negative mg/dL  Trace !    Specific Gravity Urine      1.003 - 1.035   1.033    Blood Urine      Negative   Trace !    pH Urine      4.7 - 8.0   5.5    Protein Albumin Urine      Negative mg/dL  10 !    Urobilinogen mg/dL      Normal mg/dL  Normal    Nitrite Urine      Negative   Negative    Leukocyte Esterase Urine      Negative   Negative    Mucus Urine      None Seen /LPF  Present !    RBC Urine      <=2 /HPF  4 (H)    WBC Urine      <=5 /HPF  <1    Squamous Epithelial /HPF Urine      <=1 /HPF  0    Cholesterol      <200 mg/dL 150     Triglycerides      <150 mg/dL 58     HDL Cholesterol      >=50 mg/dL 56     LDL Cholesterol Calculated       <100 mg/dL 82     Non HDL Cholesterol      <130 mg/dL 94        Legend:  (H) High  ! Abnormal    Signed Electronically by: GREGORY Daniels CNP

## 2025-07-25 NOTE — PATIENT INSTRUCTIONS
Patient Education   Preventive Care Advice   This is general advice given by our system to help you stay healthy. However, your care team may have specific advice just for you. Please talk to your care team about your preventive care needs.  Nutrition  Eat 5 or more servings of fruits and vegetables each day.  Try wheat bread, brown rice and whole grain pasta (instead of white bread, rice, and pasta).  Get enough calcium and vitamin D. Check the label on foods and aim for 100% of the RDA (recommended daily allowance).  Lifestyle  Exercise at least 150 minutes each week  (30 minutes a day, 5 days a week).  Do muscle strengthening activities 2 days a week. These help control your weight and prevent disease.  No smoking.  Wear sunscreen to prevent skin cancer.  Have a dental exam and cleaning every 6 months.  Yearly exams  See your health care team every year to talk about:  Any changes in your health.  Any medicines your care team has prescribed.  Preventive care, family planning, and ways to prevent chronic diseases.  Shots (vaccines)   HPV shots (up to age 26), if you've never had them before.  Hepatitis B shots (up to age 59), if you've never had them before.  COVID-19 shot: Get this shot when it's due.  Flu shot: Get a flu shot every year.  Tetanus shot: Get a tetanus shot every 10 years.  Pneumococcal, hepatitis A, and RSV shots: Ask your care team if you need these based on your risk.  Shingles shot (for age 50 and up)  General health tests  Diabetes screening:  Starting at age 35, Get screened for diabetes at least every 3 years.  If you are younger than age 35, ask your care team if you should be screened for diabetes.  Cholesterol test: At age 39, start having a cholesterol test every 5 years, or more often if advised.  Bone density scan (DEXA): At age 50, ask your care team if you should have this scan for osteoporosis (brittle bones).  Hepatitis C: Get tested at least once in your life.  STIs (sexually  transmitted infections)  Before age 24: Ask your care team if you should be screened for STIs.  After age 24: Get screened for STIs if you're at risk. You are at risk for STIs (including HIV) if:  You are sexually active with more than one person.  You don't use condoms every time.  You or a partner was diagnosed with a sexually transmitted infection.  If you are at risk for HIV, ask about PrEP medicine to prevent HIV.  Get tested for HIV at least once in your life, whether you are at risk for HIV or not.  Cancer screening tests  Cervical cancer screening: If you have a cervix, begin getting regular cervical cancer screening tests starting at age 21.  Breast cancer scan (mammogram): If you've ever had breasts, begin having regular mammograms starting at age 40. This is a scan to check for breast cancer.  Colon cancer screening: It is important to start screening for colon cancer at age 45.  Have a colonoscopy test every 10 years (or more often if you're at risk) Or, ask your provider about stool tests like a FIT test every year or Cologuard test every 3 years.  To learn more about your testing options, visit:   .  For help making a decision, visit:   https://bit.ly/zs46622.  Prostate cancer screening test: If you have a prostate, ask your care team if a prostate cancer screening test (PSA) at age 55 is right for you.  Lung cancer screening: If you are a current or former smoker ages 50 to 80, ask your care team if ongoing lung cancer screenings are right for you.  For informational purposes only. Not to replace the advice of your health care provider. Copyright   2023 [facility]. All rights reserved. Clinically reviewed by the [facility] Transitions Program. Yaphie [id number] - REV 01/24.

## 2025-07-26 DIAGNOSIS — R79.89 ELEVATED LFTS: Primary | ICD-10-CM

## 2025-07-29 ENCOUNTER — LAB (OUTPATIENT)
Dept: LAB | Facility: OTHER | Age: 30
End: 2025-07-29
Payer: COMMERCIAL

## 2025-07-29 ENCOUNTER — RESULTS FOLLOW-UP (OUTPATIENT)
Dept: FAMILY MEDICINE | Facility: OTHER | Age: 30
End: 2025-07-29

## 2025-07-29 DIAGNOSIS — R79.89 ELEVATED LFTS: ICD-10-CM

## 2025-07-29 DIAGNOSIS — R79.89 ELEVATED LFTS: Primary | ICD-10-CM

## 2025-07-29 LAB
ALBUMIN SERPL BCG-MCNC: 4.7 G/DL (ref 3.5–5.2)
ALP SERPL-CCNC: 92 U/L (ref 40–150)
ALT SERPL W P-5'-P-CCNC: 82 U/L (ref 0–50)
ANION GAP SERPL CALCULATED.3IONS-SCNC: 13 MMOL/L (ref 7–15)
AST SERPL W P-5'-P-CCNC: 39 U/L (ref 0–45)
BILIRUB SERPL-MCNC: 0.8 MG/DL
BUN SERPL-MCNC: 11.6 MG/DL (ref 6–20)
CALCIUM SERPL-MCNC: 9.8 MG/DL (ref 8.8–10.4)
CHLORIDE SERPL-SCNC: 102 MMOL/L (ref 98–107)
CREAT SERPL-MCNC: 0.76 MG/DL (ref 0.51–0.95)
EGFRCR SERPLBLD CKD-EPI 2021: >90 ML/MIN/1.73M2
GLUCOSE SERPL-MCNC: 84 MG/DL (ref 70–99)
HCO3 SERPL-SCNC: 24 MMOL/L (ref 22–29)
POTASSIUM SERPL-SCNC: 4 MMOL/L (ref 3.4–5.3)
PROT SERPL-MCNC: 7.4 G/DL (ref 6.4–8.3)
SODIUM SERPL-SCNC: 139 MMOL/L (ref 135–145)

## 2025-07-29 PROCEDURE — 80053 COMPREHEN METABOLIC PANEL: CPT

## 2025-08-01 ENCOUNTER — HOSPITAL ENCOUNTER (OUTPATIENT)
Dept: ULTRASOUND IMAGING | Facility: HOSPITAL | Age: 30
Discharge: HOME | End: 2025-08-01
Attending: NURSE PRACTITIONER | Admitting: RADIOLOGY
Payer: COMMERCIAL

## 2025-08-01 DIAGNOSIS — R79.89 ELEVATED LFTS: ICD-10-CM

## 2025-08-01 PROCEDURE — 76705 ECHO EXAM OF ABDOMEN: CPT

## 2025-08-01 PROCEDURE — 76705 ECHO EXAM OF ABDOMEN: CPT | Mod: 26 | Performed by: RADIOLOGY

## (undated) DEVICE — DRSG-TEGADERM LARGE 6"X8"

## (undated) DEVICE — CATH TRAY 16FR METER W/STATLOCK LATEX] A902916

## (undated) DEVICE — SCD SLEEVE-KNEE REG.

## (undated) DEVICE — GLV-8.5 BIOGEL LATEX

## (undated) DEVICE — TAPE MEDIPORE 4"X2YD 2864

## (undated) DEVICE — LABEL-STERILE PREPRINTED FOR OR

## (undated) DEVICE — CAUTERY PAD-POLYHESIVE II ADULT

## (undated) DEVICE — SUTURE-PDS II 0 CTX Z370T

## (undated) DEVICE — SUTURE-CHROMIC GUT 1 CTX 905H

## (undated) DEVICE — SOL NACL 0.9% IRRIG 1000ML BOTTLE 2F7124

## (undated) DEVICE — DRSG-XEROFORM 1X8

## (undated) DEVICE — CANISTER SUCTION MEDI-VAC GUARDIAN 2000ML 90D 65651-220

## (undated) DEVICE — DRSG-SPONGE STERILE 4X4 10/SOFT PK

## (undated) DEVICE — CATH TRAY-16FR METER W/STATLOCK LATEX]

## (undated) DEVICE — GOWN-SURG XL LVL 3 REINFORCED

## (undated) DEVICE — SYRINGE-ASEPTO IRRIGATION

## (undated) DEVICE — PREP CHLORAPREP 26ML TINTED HI-LITE ORANGE 930815

## (undated) DEVICE — SU VICRYL 0 CT 36" J358H

## (undated) DEVICE — Device

## (undated) DEVICE — TRAY PREP DRY SKIN SCRUB 067

## (undated) DEVICE — LABEL STERILE PREPRINTED FOR OR FRRH01-2M

## (undated) DEVICE — GLV 8.5 BIOGEL LATEX 30485-01

## (undated) DEVICE — TOPICAL SKIN ADHESIVE EXOFIN

## (undated) DEVICE — SU PDS II 0 CTX 36" Z370T

## (undated) DEVICE — ESU GROUND PAD ADULT W/CORD E7507

## (undated) DEVICE — GOWN SURG XL LVL 3 REINFORCED 9541

## (undated) DEVICE — FLUID TRAP FOR VIROSAFE FILTERS VSFT10-10

## (undated) DEVICE — SOL WATER IRRIG 1000ML BOTTLE 2F7114

## (undated) DEVICE — PACK-BASIN SET-UP

## (undated) DEVICE — PACK BASIN SET UP SUTCNBSBBA

## (undated) DEVICE — LIGHT HANDLE COVER FOR SKYTRON LIGHTS

## (undated) DEVICE — APPLICATOR-CHLORAPREP 26ML TINTED CHG 2%+ 70% IPA-SURGICAL

## (undated) DEVICE — CONTAINER SPECIMEN 4OZ STERILE 17099

## (undated) DEVICE — PACK-C-SECTION-CUSTOM

## (undated) DEVICE — SURGICAL BINDER-ABDOMINAL 46-62

## (undated) DEVICE — SUTURE-VICRYL 0 CT J358H

## (undated) DEVICE — SU CHROMIC 1 CTX 36" 905H

## (undated) DEVICE — PACK C-SECTION CUSTOM SMA32CSMBU

## (undated) DEVICE — IRRIGATION-H2O 1000ML

## (undated) DEVICE — IRRIGATION-NACL 1000ML

## (undated) DEVICE — SLEEVE SCD EXPRESS KNEE LENGTH MED 9529

## (undated) DEVICE — COVER LT HANDLE 2/PK 5160-2FG

## (undated) DEVICE — DRSG SPONGE STERILE 8 X 4 2259

## (undated) DEVICE — CANISTER-SUCTION 2000CC

## (undated) RX ORDER — HYDROMORPHONE HYDROCHLORIDE 1 MG/ML
INJECTION, SOLUTION INTRAMUSCULAR; INTRAVENOUS; SUBCUTANEOUS
Status: DISPENSED
Start: 2023-06-11

## (undated) RX ORDER — OXYTOCIN/0.9 % SODIUM CHLORIDE 30/500 ML
PLASTIC BAG, INJECTION (ML) INTRAVENOUS
Status: DISPENSED
Start: 2023-06-11

## (undated) RX ORDER — ONDANSETRON 2 MG/ML
INJECTION INTRAMUSCULAR; INTRAVENOUS
Status: DISPENSED
Start: 2023-06-11

## (undated) RX ORDER — DEXAMETHASONE SODIUM PHOSPHATE 10 MG/ML
INJECTION, SOLUTION INTRAMUSCULAR; INTRAVENOUS
Status: DISPENSED
Start: 2023-06-11

## (undated) RX ORDER — ONDANSETRON 2 MG/ML
INJECTION INTRAMUSCULAR; INTRAVENOUS
Status: DISPENSED
Start: 2022-03-09

## (undated) RX ORDER — FENTANYL CITRATE-0.9 % NACL/PF 10 MCG/ML
PLASTIC BAG, INJECTION (ML) INTRAVENOUS
Status: DISPENSED
Start: 2023-06-11

## (undated) RX ORDER — DEXMEDETOMIDINE HYDROCHLORIDE 100 UG/ML
INJECTION, SOLUTION INTRAVENOUS
Status: DISPENSED
Start: 2022-03-09

## (undated) RX ORDER — PROPOFOL 10 MG/ML
INJECTION, EMULSION INTRAVENOUS
Status: DISPENSED
Start: 2023-06-11

## (undated) RX ORDER — OXYTOCIN/0.9 % SODIUM CHLORIDE 30/500 ML
PLASTIC BAG, INJECTION (ML) INTRAVENOUS
Status: DISPENSED
Start: 2022-03-09

## (undated) RX ORDER — EPHEDRINE SULFATE 50 MG/ML
INJECTION, SOLUTION INTRAVENOUS
Status: DISPENSED
Start: 2022-03-09

## (undated) RX ORDER — EPHEDRINE SULFATE 50 MG/ML
INJECTION, SOLUTION INTRAMUSCULAR; INTRAVENOUS; SUBCUTANEOUS
Status: DISPENSED
Start: 2023-06-11